# Patient Record
Sex: FEMALE | Race: WHITE | NOT HISPANIC OR LATINO | Employment: OTHER | URBAN - METROPOLITAN AREA
[De-identification: names, ages, dates, MRNs, and addresses within clinical notes are randomized per-mention and may not be internally consistent; named-entity substitution may affect disease eponyms.]

---

## 2017-01-23 ENCOUNTER — OFFICE VISIT (OUTPATIENT)
Dept: OPHTHALMOLOGY | Facility: CLINIC | Age: 67
End: 2017-01-23
Payer: COMMERCIAL

## 2017-01-23 DIAGNOSIS — Z79.899 HIGH RISK MEDICATION USE: Primary | ICD-10-CM

## 2017-01-23 DIAGNOSIS — M06.9 RHEUMATOID ARTHRITIS, INVOLVING UNSPECIFIED SITE, UNSPECIFIED RHEUMATOID FACTOR PRESENCE: ICD-10-CM

## 2017-01-23 PROCEDURE — 92083 EXTENDED VISUAL FIELD XM: CPT | Performed by: OPHTHALMOLOGY

## 2017-01-23 PROCEDURE — 92134 CPTRZ OPH DX IMG PST SGM RTA: CPT | Performed by: OPHTHALMOLOGY

## 2017-01-23 PROCEDURE — 92012 INTRM OPH EXAM EST PATIENT: CPT | Performed by: OPHTHALMOLOGY

## 2017-01-23 ASSESSMENT — VISUAL ACUITY
CORRECTION_TYPE: GLASSES
OD_CC: 20/20
METHOD: SNELLEN - LINEAR
OS_CC: 20/20

## 2017-01-23 ASSESSMENT — EXTERNAL EXAM - RIGHT EYE: OD_EXAM: 1+ BROW PTOSIS

## 2017-01-23 NOTE — MR AVS SNAPSHOT
"              After Visit Summary   1/23/2017    Sofia Shay    MRN: [id number]           Patient Information     Date Of Birth          1950        Visit Information        Provider Department      1/23/2017 1:45 PM Demetri Joshi MD [facility]        Today's Diagnoses     Rheumatoid arthritis, involving unspecified site, unspecified rheumatoid factor presence (H)    -  1       Care Instructions    Use artificial tears up to 4 times daily (Refresh Tears or Systane Ultra/Balance)   Return visit 2 years for repeat Plaquenil visual field and retinal OCT   Continue yearly exams with Dr. Jones  Return visit next available for a lid evaluation    Demetri Joshi M.D.          Follow-ups after your visit        Who to contact     If you have questions or need follow up information about today's clinic visit or your schedule please contact [facility] directly at [phone].  Normal or non-critical lab and imaging results will be communicated to you by MyChart, letter or phone within 4 business days after the clinic has received the results. If you do not hear from us within 7 days, please contact the clinic through [employer] or phone. If you have a critical or abnormal lab result, we will notify you by phone as soon as possible.  Submit refill requests through [employer] or call your pharmacy and they will forward the refill request to us. Please allow 3 business days for your refill to be completed.          Additional Information About Your Visit        MyChart Information     [employer] lets you send messages to your doctor, view your test results, renew your prescriptions, schedule appointments and more. To sign up, go to [email or web address] . Click on \"Log in\" on the left side of the screen, which will take you to the Welcome page. Then click on \"Sign up Now\" on the right side of the page.     You will be asked to enter the access code listed below, as well as some personal " information. Please follow the directions to create your username and password.     Your access code is: [email or web address]  Expires: 2017 11:08 AM     Your access code will  in 90 days. If you need help or a new code, please call your [facility] clinic or [phone].        Care EveryWhere ID     This is your Care EveryWhere ID. This could be used by other organizations to access your [facility] medical records  [id number]         Blood Pressure from Last 3 Encounters:   16 90/55   16 103/47   16 95/51    Weight from Last 3 Encounters:   16 70.943 kg (156 lb 6.4 oz)   16 69.4 kg (153 lb)   16 69.854 kg (154 lb)              We Performed the Following     HC COMPUTERIZED OPHTHALMIC IMAGING RETINA     VISUAL FIELDS EXAM (EXTENDED)        Primary Care Provider Office Phone #    [facility] [phone]       No address on file        Thank you!     Thank you for choosing [facility]  for your care. Our goal is always to provide you with excellent care. Hearing back from our patients is one way we can continue to improve our services. Please take a few minutes to complete the written survey that you may receive in the mail after your visit with us. Thank you!             Your Updated Medication List - Protect others around you: Learn how to safely use, store and throw away your medicines at www.disposemymeds.org.          This list is accurate as of: 17  3:34 PM.  Always use your most recent med list.                   Brand Name Dispense Instructions for use    albuterol 108 (90 BASE) MCG/ACT Inhaler    PROAIR HFA/PROVENTIL HFA/VENTOLIN HFA    1 Inhaler    Inhale 2 puffs into the lungs every 4 hours as needed for shortness of breath / dyspnea or wheezing       calcium + D 600-200 MG-UNIT Tabs   Generic drug:  calcium carbonate-vitamin D      Take 1 tablet by mouth daily.       clonazePAM 0.5 MG tablet    klonoPIN    30 tablet    Take 1 tablet (0.5  mg) by mouth 2 times daily as needed for anxiety       hydroxychloroquine 200 MG tablet    PLAQUENIL     Take 300 mg by mouth daily Takes 1.5 of 200 mg tablet a day       leucovorin 5 MG tablet    WELLCOVORIN     Take 5 mg by mouth once a week Once per week, after methotrexate       methotrexate 2.5 MG tablet CHEMO      8 tablets weekly       OVER-THE-COUNTER     1 Bottle    Place 1 drop into both eyes 2 times daily. Soothe XP artificial tears       pantoprazole 40 MG EC tablet    PROTONIX     Take 40 mg by mouth daily       sertraline 100 MG tablet    ZOLOFT    135 tablet    Take 1.5 tabs daily (150 mg)       simvastatin 20 MG tablet    ZOCOR    90 tablet    Take 1 tablet (20 mg) by mouth At Bedtime       tocilizumab 162 MG/0.9ML subcutaneous injection    ACTEMRA     Inject 285 mg Subcutaneous every 28 days       zoledronic Acid 5 MG/100ML Soln infusion    RECLAST     Inject 5 mg into the vein once yearly

## 2017-01-23 NOTE — Clinical Note
Demetri Joshi M.D.  [facility]  [address] [place] [address]        January 24, 2017  Sofia Shay   MRN: [id number]    Dear Dr. Castillo,          Thank you for referring Sofia A Jaspal, regarding a retinal evaluation for this patient on Plaquenil for her RA..  I had an opportunity to evaluate her on 1/24/2017.  Following  are my findings.     Current Eye Medications:  Plaquenil 300 mg, art. Tears prn both eyes     Subjective:  Here for Mac OCT and HVF today for plaquenil use. Is on 300 mg for about 2 years she thinks.  Used to take HVF at [place], but they no longer have machine.  She last had one in Oct 2015    On Plaquenil, MTX, and Actemera infusions monthly.      Objective:  See Ophthalmology Exam.       Assessment: Normal baseline retinal OCT and central Rosales Visual Field both eyes in patient on Plaquenil.  Probably visually significant dermatochalasis upper lid both eyes.       Plan: Use artificial tears up to 4 times daily (Refresh Tears or Systane Ultra/Balance)   Return visit 2 years for repeat Plaquenil visual field and retinal OCT   Continue yearly exams with Dr. Jones  Return visit next available for a lid evaluation.  Encouraged stopping smoking.    Demetri Joshi M.D.               Base Eye Exam     Visual Acuity (Snellen - Linear)      Right Left   Dist cc 20/20 20/20       Correction:  Glasses      Neuro/Psych     Oriented x3:  Yes    Mood/Affect:  Normal            Additional Notes     Additional Notes     Baseline retinal OCT and central Rosales Visual Field within normal limits both eyes.  Demetri Joshi M.D.              Slit Lamp and Fundus Exam     External Exam      Right Left    External 1+ brow ptosis       Slit Lamp Exam      Right Left    Lids/Lashes 3+, Upper lid dermatochalasis, Lower lid dermatochalasis 2, Meibomian gland dysfunctionFair to good crease both eyes, min.  adiposeUpper lid margin good position 3+, Upper lid dermatochalasis, Lower lid dermatochalasis 2, Meibomian gland dysfunctionFair to good crease both eyes, min. adiposeUpper lid margin good position    Conjunctiva/Sclera Nasal Pinguecula, Temporal Pinguecula Nasal Pinguecula, Temporal Pinguecula    Cornea Clear Clear    Anterior Chamber Deep and quiet Deep and quiet    Iris Round and reactive Round and reactive    Lens Pseudoexfoliation, Trace Nuclear sclerosis cataract, 1 PH UD Pseudoexfoliation, Trace Nuclear sclerosis cataract, 1 PH UD                Thank you again for allowing us to participate in the care of your patient.  If you have any further questions or concerns please feel free to contact me at [phone].      Sincerely,      Demetri Joshi M.D.  [facility]  [address] [place] [address]  [phone]

## 2017-01-23 NOTE — PATIENT INSTRUCTIONS
Use artificial tears up to 4 times daily (Refresh Tears or Systane Ultra/Balance)   Return visit 2 years for repeat Plaquenil visual field and retinal OCT   Continue yearly exams with Dr. Jones  Return visit next available for a lid evaluation    Demetri Joshi M.D.

## 2017-01-23 NOTE — PROGRESS NOTES
Current Eye Medications:  Plaquenil 300 mg, art. Tears prn both eyes     Subjective:  Here for Mac OCT and HVF today for plaquenil use. Is on 300 mg for about 2 years she thinks.  Used to take HVF at [place], but they no longer have machine.  She last had one in Oct 2015    On Plaquenil, MTX, and Actemera infusions monthly.      Objective:  See Ophthalmology Exam.       Assessment: Normal baseline retinal OCT and central Rosales Visual Field both eyes in patient on Plaquenil.  Probably visually significant dermatochalasis upper lid both eyes.       Plan: Use artificial tears up to 4 times daily (Refresh Tears or Systane Ultra/Balance)   Return visit 2 years for repeat Plaquenil visual field and retinal OCT   Continue yearly exams with Dr. Jones  Return visit next available for a lid evaluation.  Encouraged stopping smoking.    Demetri Joshi M.D.

## 2017-04-20 ENCOUNTER — OFFICE VISIT (OUTPATIENT)
Dept: FAMILY MEDICINE | Facility: CLINIC | Age: 67
End: 2017-04-20
Payer: COMMERCIAL

## 2017-04-20 VITALS
DIASTOLIC BLOOD PRESSURE: 60 MMHG | HEART RATE: 81 BPM | SYSTOLIC BLOOD PRESSURE: 108 MMHG | OXYGEN SATURATION: 95 % | WEIGHT: 156.4 LBS | HEIGHT: 60 IN | TEMPERATURE: 97.6 F | BODY MASS INDEX: 30.7 KG/M2

## 2017-04-20 DIAGNOSIS — J44.9 CHRONIC OBSTRUCTIVE PULMONARY DISEASE, UNSPECIFIED COPD TYPE (H): ICD-10-CM

## 2017-04-20 DIAGNOSIS — J01.00 ACUTE NON-RECURRENT MAXILLARY SINUSITIS: Primary | ICD-10-CM

## 2017-04-20 PROCEDURE — 99214 OFFICE O/P EST MOD 30 MIN: CPT | Performed by: NURSE PRACTITIONER

## 2017-04-20 RX ORDER — DOXYCYCLINE 100 MG/1
100 CAPSULE ORAL 2 TIMES DAILY
Qty: 20 CAPSULE | Refills: 0 | Status: SHIPPED | OUTPATIENT
Start: 2017-04-20 | End: 2017-06-26

## 2017-04-20 RX ORDER — TIOTROPIUM BROMIDE 18 UG/1
CAPSULE ORAL; RESPIRATORY (INHALATION)
Qty: 90 CAPSULE | Refills: 3 | Status: SHIPPED | OUTPATIENT
Start: 2017-04-20 | End: 2017-06-26

## 2017-04-20 RX ORDER — PREDNISONE 20 MG/1
40 TABLET ORAL DAILY
Qty: 10 TABLET | Refills: 0 | Status: SHIPPED | OUTPATIENT
Start: 2017-04-20 | End: 2017-04-25

## 2017-04-20 NOTE — MR AVS SNAPSHOT
"              After Visit Summary   2017    Sofia Shay    MRN: [id number]           Patient Information     Date Of Birth          1950        Visit Information        Provider Department      2017 11:00 AM Zulma Harris APRN CNP [facility]        Today's Diagnoses     Acute non-recurrent maxillary sinusitis    -  1    Chronic obstructive pulmonary disease, unspecified COPD type (H)           Follow-ups after your visit        Who to contact     If you have questions or need follow up information about today's clinic visit or your schedule please contact [facility] directly at [phone].  Normal or non-critical lab and imaging results will be communicated to you by MyChart, letter or phone within 4 business days after the clinic has received the results. If you do not hear from us within 7 days, please contact the clinic through MyChart or phone. If you have a critical or abnormal lab result, we will notify you by phone as soon as possible.  Submit refill requests through [employer] or call your pharmacy and they will forward the refill request to us. Please allow 3 business days for your refill to be completed.          Additional Information About Your Visit        MyChart Information     [employer] lets you send messages to your doctor, view your test results, renew your prescriptions, schedule appointments and more. To sign up, go to [email or web address] . Click on \"Log in\" on the left side of the screen, which will take you to the Welcome page. Then click on \"Sign up Now\" on the right side of the page.     You will be asked to enter the access code listed below, as well as some personal information. Please follow the directions to create your username and password.     Your access code is: [email or web address]  Expires: 2017 11:57 AM     Your access code will  in 90 days. If you need help or a new code, please call your [facility] or " [phone].        Care EveryWhere ID     This is your Care EveryWhere ID. This could be used by other organizations to access your [facility] medical records  [id number]        Your Vitals Were     Pulse Temperature Height Pulse Oximetry BMI (Body Mass Index)       81 97.6  F (36.4  C) (Tympanic) 5' (1.524 m) 95% 30.54 kg/m2        Blood Pressure from Last 3 Encounters:   04/20/17 108/60   12/20/16 90/55   12/12/16 103/47    Weight from Last 3 Encounters:   04/20/17 156 lb 6.4 oz (70.9 kg)   12/20/16 156 lb 6.4 oz (70.9 kg)   12/12/16 153 lb (69.4 kg)              Today, you had the following     No orders found for display         Today's Medication Changes          These changes are accurate as of: 4/20/17 11:57 AM.  If you have any questions, ask your nurse or doctor.               Start taking these medicines.        Dose/Directions    doxycycline 100 MG capsule   Commonly known as:  VIBRAMYCIN   Used for:  Acute non-recurrent maxillary sinusitis   Started by:  Zulma Harris APRN CNP        Dose:  100 mg   Take 1 capsule (100 mg) by mouth 2 times daily   Quantity:  20 capsule   Refills:  0       predniSONE 20 MG tablet   Commonly known as:  DELTASONE   Used for:  Chronic obstructive pulmonary disease, unspecified COPD type (H), Acute non-recurrent maxillary sinusitis   Started by:  Zulma Harris APRN CNP        Dose:  40 mg   Take 2 tablets (40 mg) by mouth daily for 5 days   Quantity:  10 tablet   Refills:  0       tiotropium 18 MCG capsule   Commonly known as:  SPIRIVA HANDIHALER   Used for:  Chronic obstructive pulmonary disease, unspecified COPD type (H)   Started by:  Zulma Harris APRN CNP        Inhale contents of one capsule daily.   Quantity:  90 capsule   Refills:  3            Where to get your medicines      These medications were sent to [facility]  [place] - [address]  [address] [address]      Phone:  [phone]     doxycycline 100 MG capsule    predniSONE 20 MG tablet    tiotropium 18 MCG capsule                Primary Care Provider Office Phone #    Rupesh Parks [facility] [phone]       No address on file        Thank you!     Thank you for choosing [facility]  for your care. Our goal is always to provide you with excellent care. Hearing back from our patients is one way we can continue to improve our services. Please take a few minutes to complete the written survey that you may receive in the mail after your visit with us. Thank you!             Your Updated Medication List - Protect others around you: Learn how to safely use, store and throw away your medicines at www.disposemymeds.org.          This list is accurate as of: 4/20/17 11:57 AM.  Always use your most recent med list.                   Brand Name Dispense Instructions for use    albuterol 108 (90 BASE) MCG/ACT Inhaler    PROAIR HFA/PROVENTIL HFA/VENTOLIN HFA    1 Inhaler    Inhale 2 puffs into the lungs every 4 hours as needed for shortness of breath / dyspnea or wheezing       calcium + D 600-200 MG-UNIT Tabs   Generic drug:  calcium carbonate-vitamin D      Take 1 tablet by mouth daily.       clonazePAM 0.5 MG tablet    klonoPIN    30 tablet    Take 1 tablet (0.5 mg) by mouth 2 times daily as needed for anxiety       doxycycline 100 MG capsule    VIBRAMYCIN    20 capsule    Take 1 capsule (100 mg) by mouth 2 times daily       hydroxychloroquine 200 MG tablet    PLAQUENIL     Take 300 mg by mouth daily Takes 1.5 of 200 mg tablet a day       leucovorin 5 MG tablet    WELLCOVORIN     Take 5 mg by mouth once a week Once per week, after methotrexate       methotrexate 2.5 MG tablet CHEMO      8 tablets weekly       OVER-THE-COUNTER     1 Bottle    Place 1 drop into both eyes 2 times daily. Soothe XP artificial tears       pantoprazole 40 MG EC tablet    PROTONIX     Take 40 mg by mouth daily       predniSONE 20 MG tablet     DELTASONE    10 tablet    Take 2 tablets (40 mg) by mouth daily for 5 days       sertraline 100 MG tablet    ZOLOFT    135 tablet    Take 1.5 tabs daily (150 mg)       simvastatin 20 MG tablet    ZOCOR    90 tablet    Take 1 tablet (20 mg) by mouth At Bedtime       tiotropium 18 MCG capsule    SPIRIVA HANDIHALER    90 capsule    Inhale contents of one capsule daily.       tocilizumab 162 MG/0.9ML subcutaneous injection    ACTEMRA     Inject 285 mg Subcutaneous every 28 days       zoledronic Acid 5 MG/100ML Soln infusion    RECLAST     Inject 5 mg into the vein once yearly

## 2017-04-20 NOTE — NURSING NOTE
Chief Complaint   Patient presents with     Sinus Problem     x 5 days       Initial /60  Pulse 81  Temp 97.6  F (36.4  C) (Tympanic)  Ht 5' (1.524 m)  Wt 156 lb 6.4 oz (70.9 kg)  SpO2 95%  BMI 30.54 kg/m2 Estimated body mass index is 30.54 kg/(m^2) as calculated from the following:    Height as of this encounter: 5' (1.524 m).    Weight as of this encounter: 156 lb 6.4 oz (70.9 kg).  Medication Reconciliation: complete

## 2017-04-20 NOTE — PROGRESS NOTES
SUBJECTIVE:                                                    Sofia Shay is a 66 year old female who presents to clinic today for the following health issues:      ENT Symptoms             Symptoms: cc Present Absent Comment   Fever/Chills  x  chills   Fatigue  x     Muscle Aches   x    Eye Irritation  x     Sneezing  x     Nasal Femi/Drg  x     Sinus Pressure/Pain  x     Loss of smell  x     Dental pain   x    Sore Throat  x     Swollen Glands   x    Ear Pain/Fullness  x     Cough  x     Wheeze   x    Chest Pain   x    Shortness of breath   x    Rash   x    Other  x  headache     Symptom duration:  4 days   Symptom severity:  moderate   Treatments tried:  OTC oc seltzer plus   Contacts:  family         Problem list and histories reviewed & adjusted, as indicated.  Additional history: as documented        Reviewed and updated as needed this visit by clinical staff  Tobacco  Allergies  Med Hx  Surg Hx  Fam Hx  Soc Hx      Reviewed and updated as needed this visit by Provider         ROS:  Constitutional, HEENT, cardiovascular, pulmonary, gi and gu systems are negative, except as otherwise noted.    OBJECTIVE:                                                    /60  Pulse 81  Temp 97.6  F (36.4  C) (Tympanic)  Ht 5' (1.524 m)  Wt 156 lb 6.4 oz (70.9 kg)  SpO2 95%  BMI 30.54 kg/m2  Body mass index is 30.54 kg/(m^2).  GENERAL: healthy, alert and no distress  HENT: ear canals and TM's normal, nose and mouth without ulcers or lesions  NECK: no adenopathy, no asymmetry, masses, or scars and thyroid normal to palpation  RESP: lungs clear to auscultation - no rales, rhonchi or wheezes  CV: regular rate and rhythm, normal S1 S2, no S3 or S4, no murmur, click or rub, no peripheral edema and peripheral pulses strong         ASSESSMENT/PLAN:                                                          ICD-10-CM    1. Acute non-recurrent maxillary sinusitis J01.00 doxycycline (VIBRAMYCIN) 100 MG capsule      predniSONE (DELTASONE) 20 MG tablet   2. Chronic obstructive pulmonary disease, unspecified COPD type (H) J44.9 tiotropium (SPIRIVA HANDIHALER) 18 MCG capsule     predniSONE (DELTASONE) 20 MG tablet         The risks, benefits and treatment options of prescribed medications or other treatments have been discussed with the patient. The patient verbalized their understanding and should call or follow up if no improvement or if they develop further problems.    ANN MARIE Segura [facility]

## 2017-04-24 ENCOUNTER — TELEPHONE (OUTPATIENT)
Dept: FAMILY MEDICINE | Facility: CLINIC | Age: 67
End: 2017-04-24

## 2017-06-26 ENCOUNTER — OFFICE VISIT (OUTPATIENT)
Dept: FAMILY MEDICINE | Facility: CLINIC | Age: 67
End: 2017-06-26
Payer: COMMERCIAL

## 2017-06-26 VITALS
BODY MASS INDEX: 30.31 KG/M2 | WEIGHT: 155.2 LBS | TEMPERATURE: 98 F | OXYGEN SATURATION: 95 % | HEART RATE: 91 BPM | DIASTOLIC BLOOD PRESSURE: 64 MMHG | SYSTOLIC BLOOD PRESSURE: 96 MMHG

## 2017-06-26 DIAGNOSIS — H65.03 BILATERAL ACUTE SEROUS OTITIS MEDIA, RECURRENCE NOT SPECIFIED: Primary | ICD-10-CM

## 2017-06-26 DIAGNOSIS — H61.22 IMPACTED CERUMEN OF LEFT EAR: ICD-10-CM

## 2017-06-26 PROCEDURE — 99213 OFFICE O/P EST LOW 20 MIN: CPT | Performed by: NURSE PRACTITIONER

## 2017-06-26 ASSESSMENT — PAIN SCALES - GENERAL: PAINLEVEL: NO PAIN (0)

## 2017-06-26 NOTE — NURSING NOTE
Chief Complaint   Patient presents with     Ear Problem     Plugged ears x10 days.       Initial BP 96/64 (BP Location: Right arm, Cuff Size: Adult Regular)  Pulse 91  Temp 98  F (36.7  C) (Oral)  Wt 155 lb 3.2 oz (70.4 kg)  SpO2 95%  Breastfeeding? No  BMI 30.31 kg/m2 Estimated body mass index is 30.31 kg/(m^2) as calculated from the following:    Height as of 4/20/17: 5' (1.524 m).    Weight as of this encounter: 155 lb 3.2 oz (70.4 kg).  Medication Reconciliation: complete         Juli Ortiz, CMA

## 2017-06-26 NOTE — PATIENT INSTRUCTIONS
Try Mucinex DM  Earache, No Infection (Adult)  Earaches can happen without an infection. This occurs when air and fluid build up behind the eardrum causing a feeling of fullness and discomfort and reduced hearing. This is called otitis media with effusion (OME) or serous otitis media. It means there is fluid in the middle ear. It is not the same as acute otitis media, which is typically from infection.  OME can happen when you have a cold if congestion blocks the passage that drains the middle ear. This passage is called the eustachian tube. OME may also occur with nasal allergies or after a bacterial middle ear infection.    The pain or discomfort may come and go. You may hear clicking or popping sounds when you chew or swallow. You may feel that your balance is off. Or you may hear ringing in the ear.  It often takes from several weeks up to 3 months for the fluid to clear on its own. Oral pain relievers and ear drops help if there is pain. Decongestants and antihistamines sometimes help. Antibiotics don't help since there is no infection. Your doctor may prescribe a nasal spray to help reduce swelling in the nose and eustachian tube. This can allow the ear to drain.  If your OME doesn't improve after 3 months, surgery may be used to drain the fluid and insert a small tube in the eardrum to allow continued drainage.  Because the middle ear fluid can become infected, it is important to watch for signs of an ear infection which may develop later. These signs include increased ear pain, fever, or drainage from the ear.  Home care  The following guidelines will help you care for yourself at home:    You may use over-the-counter medicine as directed to control pain, unless another medicine was prescribed. If you have chronic liver or kidney disease or ever had a stomach ulcer or GI bleeding, talk with your doctor before using these medicines. Aspirin should never be used in anyone under 18 years of age who is ill with  a fever. It may cause severe liver damage.    You may use over-the-counter decongestants such as phenylephrine or pseudoephedrine. But they are not always helpful. Don't use nasal spray decongestants more than 3 days. Longer use can make congestion worse. Prescription nasal sprays from your doctor don't typically have those restrictions.    Antihistamines may help if you are also having allergy symptoms.    You may use medicines such as guaifenesin to thin mucus and promote drainage.  Follow-up care  Follow up with your healthcare provider or as advised if you are not feeling better after 3 days.  When to seek medical advice  Call your healthcare provider right away if any of the following occur:    Your ear pain gets worse or does not start to improve     Fever of 100.4 F (38 C) or higher, or as directed by your healthcare provider    Fluid or blood draining from the ear    Headache or sinus pain    Stiff neck    Unusual drowsiness or confusion  Date Last Reviewed: 10/1/2016    9800-0381 [employer]. [address] [place] [address]. All rights reserved. This information is not intended as a substitute for professional medical care. Always follow your healthcare professional's instructions.      [facility]    If you have any questions regarding to your visit please contact your care team:     Team Pink:   Clinic Hours Telephone Number   Internal Medicine:  Dr. Edwina Newberry, NP       7am-7pm  Monday - Thursday   7am-5pm  Fridays  [phone]  (Appointment scheduling available 24/7)    Questions about your visit?  Team Line  [phone]   Urgent Care - [place] - 11am-9pm Monday-Friday Saturday-Sunday- 9am-5pm   [place] - 5pm-9pm Monday-Friday Saturday-Sunday- 9am-5pm  [phone] - [employer]  [phone] - [place]       What options do I have for visits at the clinic other than the traditional office  visit?  To expand how we care for you, many of our providers are utilizing electronic visits (e-visits) and telephone visits, when medically appropriate, for interactions with their patients rather than a visit in the clinic.   We also offer nurse visits for many medical concerns. Just like any other service, we will bill your insurance company for this type of visit based on time spent on the phone with your provider. Not all insurance companies cover these visits. Please check with your medical insurance if this type of visit is covered. You will be responsible for any charges that are not paid by your insurance.      E-visits via [employer]:  generally incur a $35.00 fee.  Telephone visits:  Time spent on the phone: *charged based on time that is spent on the phone in increments of 10 minutes. Estimated cost:   5-10 mins $30.00   11-20 mins. $59.00   21-30 mins. $85.00   Use [employer] (secure email communication and access to your chart) to send your primary care provider a message or make an appointment. Ask someone on your Team how to sign up for [employer].    For a Price Quote for your services, please call our Consumer Price Line at [phone].    As always, Thank you for trusting us with your health care needs!    Juli Otriz, CMA

## 2017-06-26 NOTE — MR AVS SNAPSHOT
After Visit Summary   6/26/2017    Sofia Shay    MRN: [id number]           Patient Information     Date Of Birth          1950        Visit Information        Provider Department      6/26/2017 2:40 PM Laura Newberry APRN [facility]        Today's Diagnoses     Bilateral acute serous otitis media, recurrence not specified    -  1      Care Instructions    Try Mucinex DM  Earache, No Infection (Adult)  Earaches can happen without an infection. This occurs when air and fluid build up behind the eardrum causing a feeling of fullness and discomfort and reduced hearing. This is called otitis media with effusion (OME) or serous otitis media. It means there is fluid in the middle ear. It is not the same as acute otitis media, which is typically from infection.  OME can happen when you have a cold if congestion blocks the passage that drains the middle ear. This passage is called the eustachian tube. OME may also occur with nasal allergies or after a bacterial middle ear infection.    The pain or discomfort may come and go. You may hear clicking or popping sounds when you chew or swallow. You may feel that your balance is off. Or you may hear ringing in the ear.  It often takes from several weeks up to 3 months for the fluid to clear on its own. Oral pain relievers and ear drops help if there is pain. Decongestants and antihistamines sometimes help. Antibiotics don't help since there is no infection. Your doctor may prescribe a nasal spray to help reduce swelling in the nose and eustachian tube. This can allow the ear to drain.  If your OME doesn't improve after 3 months, surgery may be used to drain the fluid and insert a small tube in the eardrum to allow continued drainage.  Because the middle ear fluid can become infected, it is important to watch for signs of an ear infection which may develop later. These signs include increased ear pain, fever, or drainage from  the ear.  Home care  The following guidelines will help you care for yourself at home:    You may use over-the-counter medicine as directed to control pain, unless another medicine was prescribed. If you have chronic liver or kidney disease or ever had a stomach ulcer or GI bleeding, talk with your doctor before using these medicines. Aspirin should never be used in anyone under 18 years of age who is ill with a fever. It may cause severe liver damage.    You may use over-the-counter decongestants such as phenylephrine or pseudoephedrine. But they are not always helpful. Don't use nasal spray decongestants more than 3 days. Longer use can make congestion worse. Prescription nasal sprays from your doctor don't typically have those restrictions.    Antihistamines may help if you are also having allergy symptoms.    You may use medicines such as guaifenesin to thin mucus and promote drainage.  Follow-up care  Follow up with your healthcare provider or as advised if you are not feeling better after 3 days.  When to seek medical advice  Call your healthcare provider right away if any of the following occur:    Your ear pain gets worse or does not start to improve     Fever of 100.4 F (38 C) or higher, or as directed by your healthcare provider    Fluid or blood draining from the ear    Headache or sinus pain    Stiff neck    Unusual drowsiness or confusion  Date Last Reviewed: 10/1/2016    2252-7853 [employer]. [address] [place] [address]. All rights reserved. This information is not intended as a substitute for professional medical care. Always follow your healthcare professional's instructions.      [facility]    If you have any questions regarding to your visit please contact your care team:     Team Pink:   Clinic Hours Telephone Number   Internal Medicine:  Dr. Edwina Newberry NP       7am-7pm  Monday - Thursday   7am-5pm  Fridays  [phone]  [phone]  (Appointment scheduling available 24/7)    Questions about your visit?  Team Line  [phone]   Urgent Care - [place] - 11am-9pm Monday-Friday Saturday-Sunday- 9am-5pm   [place] - 5pm-9pm Monday-Friday Saturday-Sunday- 9am-5pm  [phone] - [place]   [phone] - [place]       What options do I have for visits at the clinic other than the traditional office visit?  To expand how we care for you, many of our providers are utilizing electronic visits (e-visits) and telephone visits, when medically appropriate, for interactions with their patients rather than a visit in the clinic.   We also offer nurse visits for many medical concerns. Just like any other service, we will bill your insurance company for this type of visit based on time spent on the phone with your provider. Not all insurance companies cover these visits. Please check with your medical insurance if this type of visit is covered. You will be responsible for any charges that are not paid by your insurance.      E-visits via Sutus:  generally incur a $35.00 fee.  Telephone visits:  Time spent on the phone: *charged based on time that is spent on the phone in increments of 10 minutes. Estimated cost:   5-10 mins $30.00   11-20 mins. $59.00   21-30 mins. $85.00   Use Risk I/Ot (secure email communication and access to your chart) to send your primary care provider a message or make an appointment. Ask someone on your Team how to sign up for Sutus.    For a Price Quote for your services, please call our Consumer Price Line at [phone].    As always, Thank you for trusting us with your health care needs!    Juli Ortiz, CASTILLO              Follow-ups after your visit        Who to contact     If you have questions or need follow up information about today's clinic visit or your schedule please contact [facility] directly at [phone].  Normal or non-critical lab and imaging results  "will be communicated to you by MyChart, letter or phone within 4 business days after the clinic has received the results. If you do not hear from us within 7 days, please contact the clinic through BearTail or phone. If you have a critical or abnormal lab result, we will notify you by phone as soon as possible.  Submit refill requests through BearTail or call your pharmacy and they will forward the refill request to us. Please allow 3 business days for your refill to be completed.          Additional Information About Your Visit        BearTail Information     BearTail lets you send messages to your doctor, view your test results, renew your prescriptions, schedule appointments and more. To sign up, go to [email or web address] . Click on \"Log in\" on the left side of the screen, which will take you to the Welcome page. Then click on \"Sign up Now\" on the right side of the page.     You will be asked to enter the access code listed below, as well as some personal information. Please follow the directions to create your username and password.     Your access code is: [email or web address]  Expires: 2017 11:57 AM     Your access code will  in 90 days. If you need help or a new code, please call your [facility] or [phone].        Care EveryWhere ID     This is your Care EveryWhere ID. This could be used by other organizations to access your [facility] medical records  [id number]        Your Vitals Were     Pulse Temperature Pulse Oximetry Breastfeeding? BMI (Body Mass Index)       91 98  F (36.7  C) (Oral) 95% No 30.31 kg/m2        Blood Pressure from Last 3 Encounters:   17 96/64   17 108/60   16 90/55    Weight from Last 3 Encounters:   17 155 lb 3.2 oz (70.4 kg)   17 156 lb 6.4 oz (70.9 kg)   16 156 lb 6.4 oz (70.9 kg)              Today, you had the following     No orders found for display       Primary Care Provider Office Phone #    [facility] [phone] "       No address on file        Equal Access to Services     ANDRZEJ PATENABILA : Hadii tera valencia judd Galindo, wamiladisda luqjaleel, qaybta kaaljorge farhantanaericka, jack idiin haybillyerinn blackwoodguanacogianna bobby. So [facility] [phone].    ATENCIÓN: Si habla español, tiene a arellano disposición servicios gratuitos de asistencia lingüística. Miguelame al [phone].    We comply with applicable federal civil rights laws and [place] laws. We do not discriminate on the basis of race, color, national origin, age, disability sex, sexual orientation or gender identity.            Thank you!     Thank you for choosing [facility]  for your care. Our goal is always to provide you with excellent care. Hearing back from our patients is one way we can continue to improve our services. Please take a few minutes to complete the written survey that you may receive in the mail after your visit with us. Thank you!             Your Updated Medication List - Protect others around you: Learn how to safely use, store and throw away your medicines at www.disposemymeds.org.          This list is accurate as of: 6/26/17  3:23 PM.  Always use your most recent med list.                   Brand Name Dispense Instructions for use Diagnosis    albuterol 108 (90 BASE) MCG/ACT Inhaler    PROAIR HFA/PROVENTIL HFA/VENTOLIN HFA    1 Inhaler    Inhale 2 puffs into the lungs every 4 hours as needed for shortness of breath / dyspnea or wheezing    COPD exacerbation (H)       calcium + D 600-200 MG-UNIT Tabs   Generic drug:  calcium carbonate-vitamin D      Take 1 tablet by mouth daily.        clonazePAM 0.5 MG tablet    klonoPIN    30 tablet    Take 1 tablet (0.5 mg) by mouth 2 times daily as needed for anxiety    Anxiety       hydroxychloroquine 200 MG tablet    PLAQUENIL     Take 300 mg by mouth daily Takes 1.5 of 200 mg tablet a day        leucovorin 5 MG tablet    WELLCOVORIN     Take 5 mg by mouth once a week Once per week, after methotrexate         methotrexate 2.5 MG tablet CHEMO      8 tablets weekly        OVER-THE-COUNTER     1 Bottle    Place 1 drop into both eyes 2 times daily. Soothe XP artificial tears    Dry eye syndrome       pantoprazole 40 MG EC tablet    PROTONIX     Take 40 mg by mouth daily        sertraline 100 MG tablet    ZOLOFT    135 tablet    Take 1.5 tabs daily (150 mg)    Anxiety       simvastatin 20 MG tablet    ZOCOR    90 tablet    Take 1 tablet (20 mg) by mouth At Bedtime    Hyperlipidemia LDL goal <130       tocilizumab 162 MG/0.9ML subcutaneous injection    ACTEMRA     Inject 285 mg Subcutaneous every 28 days        zoledronic Acid 5 MG/100ML Soln infusion    RECLAST     Inject 5 mg into the vein once yearly

## 2017-06-26 NOTE — PROGRESS NOTES
SUBJECTIVE:                                                    Sofia Shay is a 67 year old female who presents to clinic today for the following health issues:      Chief Complaint   Patient presents with     Ear Problem     Plugged ears x10 days.     Patient complains of plugged ear for the past 10 days.  She complains of muffled hearing.  She denies pain, rhinorrhea, sore throat.  She has tried pseudoephedrine, which she felt increased drainage and made her cough.  She also tried an antihistamine, but did not feel it helped.      Problem list and histories reviewed & adjusted, as indicated.  Additional history: as documented    Patient Active Problem List   Diagnosis     Mild major depression (H)     RA (rheumatoid arthritis) (H)     NICOLE (generalized anxiety disorder)     Tobacco abuse     Advanced directives, counseling/discussion     COPD (chronic obstructive pulmonary disease) (H)     Health Care Home     MGUS (monoclonal gammopathy of unknown significance)     PXF (pseudoexfoliation of lens capsule) right     Hyperlipidemia LDL goal <130     Past Surgical History:   Procedure Laterality Date     D & C       HYSTERECTOMY, PAP NO LONGER INDICATED       SALPINGO OOPHORECTOMY,R/L/RUTH         Social History   Substance Use Topics     Smoking status: Current Every Day Smoker     Packs/day: 0.50     Types: Cigarettes     Smokeless tobacco: Never Used      Comment: not ready to quit/declined quit info 12/20/16     Alcohol use No     Family History   Problem Relation Age of Onset     Hypertension Father      Cardiovascular Father      CANCER Father      lung     DIABETES Maternal Grandmother      CANCER Mother      lung     Hypertension Mother      Macular Degeneration Paternal Aunt      CEREBROVASCULAR DISEASE No family hx of      Thyroid Disease No family hx of      Glaucoma No family hx of          Current Outpatient Prescriptions   Medication Sig Dispense Refill     clonazePAM (KLONOPIN) 0.5 MG tablet  Take 1 tablet (0.5 mg) by mouth 2 times daily as needed for anxiety 30 tablet 1     simvastatin (ZOCOR) 20 MG tablet Take 1 tablet (20 mg) by mouth At Bedtime 90 tablet 3     tocilizumab (ACTEMRA) 162 MG/0.9ML subcutaneous injection Inject 285 mg Subcutaneous every 28 days        zoledronic Acid (RECLAST) 5 MG/100ML SOLN Inject 5 mg into the vein once yearly       leucovorin (WELLCOVORIN) 5 MG tablet Take 5 mg by mouth once a week Once per week, after methotrexate       albuterol (PROAIR HFA, PROVENTIL HFA, VENTOLIN HFA) 108 (90 BASE) MCG/ACT inhaler Inhale 2 puffs into the lungs every 4 hours as needed for shortness of breath / dyspnea or wheezing 1 Inhaler 5     pantoprazole (PROTONIX) 40 MG enteric coated tablet Take 40 mg by mouth daily       sertraline (ZOLOFT) 100 MG tablet Take 1.5 tabs daily (150 mg) 135 tablet 3     hydroxychloroquine (PLAQUENIL) 200 MG tablet Take 300 mg by mouth daily Takes 1.5 of 200 mg tablet a day       Calcium Carbonate-Vitamin D (CALCIUM + D) 600-200 MG-UNIT per tablet Take 1 tablet by mouth daily.       OVER-THE-COUNTER Place 1 drop into both eyes 2 times daily. Soothe XP artificial tears 1 Bottle      METHOTREXATE 2.5 MG OR TABS 8 tablets weekly       Allergies   Allergen Reactions     Gabapentin Other (See Comments)     dizzy     Latex Itching     Penicillins Hives     BP Readings from Last 3 Encounters:   06/26/17 96/64   04/20/17 108/60   12/20/16 90/55    Wt Readings from Last 3 Encounters:   06/26/17 155 lb 3.2 oz (70.4 kg)   04/20/17 156 lb 6.4 oz (70.9 kg)   12/20/16 156 lb 6.4 oz (70.9 kg)                  Labs reviewed in EPIC    Reviewed and updated as needed this visit by clinical staff       Reviewed and updated as needed this visit by Provider         ROS:  Constitutional, HEENT, cardiovascular, pulmonary, gi and gu systems are negative, except as otherwise noted.    OBJECTIVE:     BP 96/64 (BP Location: Right arm, Cuff Size: Adult Regular)  Pulse 91  Temp 98  F  (36.7  C) (Oral)  Wt 155 lb 3.2 oz (70.4 kg)  SpO2 95%  Breastfeeding? No  BMI 30.31 kg/m2  Body mass index is 30.31 kg/(m^2).  GENERAL: healthy, alert and no distress  EYES: Eyes grossly normal to inspection, PERRL and conjunctivae and sclerae normal  HENT: normal cephalic/atraumatic, right ear: clear effusion, left ear: occluded with wax and clear effusion following wax removal, nose and mouth without ulcers or lesions, oropharynx clear, oral mucous membranes moist  NECK: no adenopathy, no asymmetry, masses, or scars and thyroid normal to palpation  RESP: lungs clear to auscultation - no rales, rhonchi or wheezes  CV: regular rate and rhythm, normal S1 S2, no S3 or S4, no murmur, click or rub, no peripheral edema and peripheral pulses strong  MS: no gross musculoskeletal defects noted, no edema    Diagnostic Test Results:  none     ASSESSMENT/PLAN:       1. Bilateral acute serous otitis media, recurrence not specified  Patient to continue over the counter decongestants.  If no improvement in 2 weeks, she should follow-up with ENT.    2. Impacted cerumen of left ear  Ear wash performed and wax removed.  - REMOVE IMPACTED CERUMEN    FUTURE APPOINTMENTS:       - Follow-up for annual visit or as needed    ANN MARIE Smith CNP  [facility]

## 2017-07-15 DIAGNOSIS — F41.9 ANXIETY: ICD-10-CM

## 2017-07-17 NOTE — TELEPHONE ENCOUNTER
Sertraline     Last Written Prescription Date: 04/01/2016  Last Fill Quantity: 135, # refills: 3  Last Office Visit with [facility] primary care provider:  04/20/2017        Last PHQ-9 score on record=   PHQ-9 SCORE 12/12/2016   Total Score -   Total Score 6     PHQ-9 SCORE 12/10/2014 4/1/2016 12/12/2016   Total Score 11 - -   Total Score - 7 6     NICOLE-7 SCORE 12/10/2014 4/1/2016 12/12/2016   Total Score 12 - -   Total Score - 9 9       Alan SIDDIQUI (R)

## 2017-07-24 ENCOUNTER — TELEPHONE (OUTPATIENT)
Dept: FAMILY MEDICINE | Facility: CLINIC | Age: 67
End: 2017-07-24

## 2017-07-24 RX ORDER — SERTRALINE HYDROCHLORIDE 100 MG/1
TABLET, FILM COATED ORAL
Qty: 135 TABLET | Refills: 0 | Status: SHIPPED | OUTPATIENT
Start: 2017-07-24 | End: 2017-11-29

## 2017-07-24 NOTE — TELEPHONE ENCOUNTER
Patient returned the call.  Advised her she is due for , she is getting mammograms yearly so she will call to schedule.  She is due to come in for an appt so she will get a FIT test when she comes in for that.  Asked if we could complete the phq9/gad7, she stated it would be the same as last time, so was not done.

## 2017-07-24 NOTE — TELEPHONE ENCOUNTER
Patient is due for colon cancer screening along with other HM items.  FIT test was ordered 4/1/16, not completed.  See HM for other items that are due to ask/remind patient.  PCP is listed as [facility], please verify which clinic patient is going to.    Left message for patient to return call.

## 2017-08-02 DIAGNOSIS — F41.9 ANXIETY: ICD-10-CM

## 2017-08-03 RX ORDER — CLONAZEPAM 0.5 MG/1
TABLET ORAL
Qty: 30 TABLET | Refills: 0 | Status: SHIPPED | OUTPATIENT
Start: 2017-08-03 | End: 2017-11-29

## 2017-08-03 NOTE — TELEPHONE ENCOUNTER
Routing refill request to provider for review/approval because:  Drug not on the [facility] refill protocol     Fina Whyte RN

## 2017-08-03 NOTE — TELEPHONE ENCOUNTER
CLonazepam     Last Written Prescription Date: 12/20/16  Last Fill Quantity: 30,  # refills: 1   Last Office Visit with [facility] or [facility] prescribing provider: 06/26/17

## 2017-09-18 ENCOUNTER — TELEPHONE (OUTPATIENT)
Dept: FAMILY MEDICINE | Facility: CLINIC | Age: 67
End: 2017-09-18

## 2017-09-18 NOTE — TELEPHONE ENCOUNTER
Panel Management Review            Composite cancer screening  Chart review shows that this patient is due/due soon for the following Colonoscopy  Summary:    Patient is due/failing the following:   COLONOSCOPY    Action needed:   Patient needs colon cancer screening- has greed to FIT kit in the past  Will mail one to her    Type of outreach:    Sent letter.    Questions for provider review:    None                                                                                                                                    CASTILLO TUCKER      .

## 2017-09-18 NOTE — LETTER
[facility]  [address] [address]  [phone]    September 18, 2017    Sofia Shay  [address] [address]          Dear Sofia,    Zulma Harris NP has been reviewing your chart.  It appears that there are aspects of your care that could be improved.   At this time you are due for a colonoscopy.    Colon Cancer Screening- Recommended every 5-10 years, depending on your history, in order to prevent and detect colon cancer at its earliest stages.  Colon cancer is now the second leading cause of death in the [place] for both men and women and there are over 130,000 new cases and 50,000 deaths per year from colon cancer.  Colonoscopies can prevent 90-95% of these deaths.  Problem lesions can be removed before they ever become cancer.  This test is not only looking for cancer, but also getting rid of precancerous lesions.  You are usually given some sedation which makes the test very comfortable for most people.     If you have not had a colonoscopy, we encourage you to schedule by contacting Central Scheduling at [phone] Monday -Friday 9am-8pm, or Saturday 9am-6pm.  They will assist you with finding the most convenient time and location.    If you do not wish to do a colonoscopy or cannot afford to do one, at this time, there is another option.  It is called a Fit test or Fecal Immunochemical Occult Blood Test (take home stool sample kit).  It does not replace the colonoscopy for colorectal cancer screening, but it can detect hidden bleeding in the lower colon.  It does need to be repeated every year and if a positive result is obtained, you would be referred for a colonoscopy.  The FIT test is really easy to do and does not require any diet or medication restrictions and involves only one collection sample.   I have enclosed a FIT kit for you if you prefer to do this.    If you have any questions, please call us at  [phone].      Thank you,    Zulma Harris NP/ marciano  [facility] - [phone]  [email or web address]

## 2017-11-28 ENCOUNTER — OFFICE VISIT (OUTPATIENT)
Dept: OPTOMETRY | Facility: CLINIC | Age: 67
End: 2017-11-28
Payer: COMMERCIAL

## 2017-11-28 DIAGNOSIS — H26.8 PXF (PSEUDOEXFOLIATION OF LENS CAPSULE): ICD-10-CM

## 2017-11-28 DIAGNOSIS — H25.13 NUCLEAR SCLEROTIC CATARACT OF BOTH EYES: ICD-10-CM

## 2017-11-28 DIAGNOSIS — H52.03 HYPEROPIA, BILATERAL: Primary | ICD-10-CM

## 2017-11-28 DIAGNOSIS — H52.223 REGULAR ASTIGMATISM OF BOTH EYES: ICD-10-CM

## 2017-11-28 DIAGNOSIS — H52.4 PRESBYOPIA: ICD-10-CM

## 2017-11-28 PROCEDURE — 92015 DETERMINE REFRACTIVE STATE: CPT | Performed by: OPTOMETRIST

## 2017-11-28 PROCEDURE — 92014 COMPRE OPH EXAM EST PT 1/>: CPT | Performed by: OPTOMETRIST

## 2017-11-28 ASSESSMENT — REFRACTION_MANIFEST
OS_CYLINDER: +0.25
OD_CYLINDER: +0.75
OS_CYLINDER: +0.25
OD_CYLINDER: +0.75
OS_ADD: +2.25
OS_AXIS: 170
OD_SPHERE: +1.75
OS_SPHERE: +2.25
OD_AXIS: 006
METHOD_AUTOREFRACTION: 1
OD_SPHERE: +1.75
OS_AXIS: 156
OS_SPHERE: +1.75
OD_ADD: +2.25
OD_AXIS: 005

## 2017-11-28 ASSESSMENT — VISUAL ACUITY
OS_CC: 20/20
OD_CC: 20/25
OD_CC+: -1
CORRECTION_TYPE: GLASSES
OD_CC: 20/30
OS_CC: 20/30-1
METHOD: SNELLEN - LINEAR

## 2017-11-28 ASSESSMENT — SLIT LAMP EXAM - LIDS
COMMENTS: 2+ DERMATOCHALASIS: UPPER LID
COMMENTS: 2+ DERMATOCHALASIS: UPPER LID

## 2017-11-28 ASSESSMENT — REFRACTION_WEARINGRX
OD_AXIS: 180
OD_SPHERE: +1.50
OS_SPHERE: +1.75
SPECS_TYPE: BIFOCAL
OD_ADD: +2.00
OS_AXIS: 170
OD_CYLINDER: +1.00
OS_CYLINDER: +0.50
OS_ADD: +2.00

## 2017-11-28 ASSESSMENT — KERATOMETRY
OS_AXISANGLE2_DEGREES: 42
OD_K1POWER_DIOPTERS: 43.00
OD_AXISANGLE2_DEGREES: 153
OS_K2POWER_DIOPTERS: 42.75
OD_K2POWER_DIOPTERS: 42.75
OS_K1POWER_DIOPTERS: 43.00

## 2017-11-28 ASSESSMENT — TONOMETRY
OS_IOP_MMHG: 18
IOP_METHOD: APPLANATION
OD_IOP_MMHG: 18

## 2017-11-28 ASSESSMENT — EXTERNAL EXAM - LEFT EYE: OS_EXAM: 1+ BROW PTOSIS

## 2017-11-28 ASSESSMENT — EXTERNAL EXAM - RIGHT EYE: OD_EXAM: 1+ BROW PTOSIS

## 2017-11-28 ASSESSMENT — CUP TO DISC RATIO
OD_RATIO: 0.2
OS_RATIO: 0.2

## 2017-11-28 ASSESSMENT — CONF VISUAL FIELD
OD_NORMAL: 1
METHOD: COUNTING FINGERS
OS_NORMAL: 1

## 2017-11-28 NOTE — PROGRESS NOTES
Chief Complaint   Patient presents with     COMPREHENSIVE EYE EXAM     yearly      Accompanied by daughter    Last Eye Exam: 11/16/2016  Dilated Previously: Yes    What are you currently using to see?  Glasses, wears all of the time        Distance Vision Acuity: Noticed gradual change, feels like things are getting worse.     Near Vision Acuity: Not satisfied, thinks that there are changes here, and she notices them more lately     Eye Comfort: dry, air at home is very dry   Do you use eye drops? : Yes: Uses her drops as needed, not daily   Occupation or Hobbies: Retired     [employer]           Medical, surgical and family histories reviewed and updated 11/28/2017.     Discontinued Plaquenil in June 2017    OBJECTIVE: See Ophthalmology exam    ASSESSMENT:    ICD-10-CM    1. Hyperopia, bilateral H52.03 EYE EXAM (SIMPLE-NONBILLABLE)     REFRACTION   2. Regular astigmatism of both eyes H52.223 EYE EXAM (SIMPLE-NONBILLABLE)     REFRACTION   3. Presbyopia H52.4 EYE EXAM (SIMPLE-NONBILLABLE)     REFRACTION   4. PXF (pseudoexfoliation of lens capsule) right H25.89 EYE EXAM (SIMPLE-NONBILLABLE)     REFRACTION   5. Nuclear sclerotic cataract of both eyes H25.13 EYE EXAM (SIMPLE-NONBILLABLE)     REFRACTION      PLAN:   Use artificial tears   Patient Instructions   Patient was advised of today's exam findings.  Optional to fill new glasses prescription, minimal change  Monitor mild cataracts   Return in 1 year for eye exam    Jacquelyn Jones O.D.  [facility]   [address] [place], [place] [id number] [phone]

## 2017-11-28 NOTE — MR AVS SNAPSHOT
"              After Visit Summary   11/28/2017    Sofia Shay    MRN: [id number]           Patient Information     Date Of Birth          1950        Visit Information        Provider Department      11/28/2017 11:00 AM Jacquelyn Jones OD [facility]        Care Instructions    Patient was advised of today's exam findings.  Optional to fill new glasses prescription, minimal change  Monitor mild cataracts   Return in 1 year for eye exam    Jacquelyn Jones O.D.  [facility]   [address] [place] [address]  [phone]            Follow-ups after your visit        Your next 10 appointments already scheduled     Nov 29, 2017 10:20 AM CST   Pre-Op physical with ANN MARIE Ta CNP   [facility]    [address] [address] [phone]              Who to contact     If you have questions or need follow up information about today's clinic visit or your schedule please contact [facility] directly at [phone].  Normal or non-critical lab and imaging results will be communicated to you by [employer], letter or phone within 4 business days after the clinic has received the results. If you do not hear from us within 7 days, please contact the clinic through [employer] or phone. If you have a critical or abnormal lab result, we will notify you by phone as soon as possible.  Submit refill requests through [employer] or call your pharmacy and they will forward the refill request to us. Please allow 3 business days for your refill to be completed.          Additional Information About Your Visit        [employer] Information     [employer] lets you send messages to your doctor, view your test results, renew your prescriptions, schedule appointments and more. To sign up, go to [email or web address] . Click on \"Log in\" on the left side of the screen, which will take you to the Welcome page. Then " "click on \"Sign up Now\" on the right side of the page.     You will be asked to enter the access code listed below, as well as some personal information. Please follow the directions to create your username and password.     Your access code is: [email or web address]  Expires: 2018 12:10 PM     Your access code will  in 90 days. If you need help or a new code, please call your [facility] clinic or [phone].        Care EveryWhere ID     This is your Care EveryWhere ID. This could be used by other organizations to access your [facility] medical records  [id number]         Blood Pressure from Last 3 Encounters:   17 96/64   17 108/60   16 90/55    Weight from Last 3 Encounters:   17 70.4 kg (155 lb 3.2 oz)   17 70.9 kg (156 lb 6.4 oz)   16 70.9 kg (156 lb 6.4 oz)              Today, you had the following     No orders found for display       Primary Care Provider Office Phone # Fax #    [facility] [phone]       [address] [address]        Equal Access to Services     PABLO WILSON : Hadii aad ku hadasho Soomaali, waaxda luqadaha, qaybta kaalmada adeegyada, waxay ashley haybillyn kumar masters . So [facility] [phone].    ATENCIÓN: Si habla español, tiene a arellano disposición servicios gratuitos de asistencia lingüística. Llame al [phone].    We comply with applicable federal civil rights laws and [place] laws. We do not discriminate on the basis of race, color, national origin, age, disability, sex, sexual orientation, or gender identity.            Thank you!     Thank you for choosing [facility]  for your [facility]. Our goal is always to provide you with excellent care. Hearing back from our patients is one way we can continue to improve our services. Please take a few minutes to complete the written survey that you may receive in the mail after your visit with us. Thank you!             Your Updated Medication List " - Protect others around you: Learn how to safely use, store and throw away your medicines at www.disposemymeds.org.          This list is accurate as of: 11/28/17 12:11 PM.  Always use your most recent med list.                   Brand Name Dispense Instructions for use Diagnosis    albuterol 108 (90 BASE) MCG/ACT Inhaler    PROAIR HFA/PROVENTIL HFA/VENTOLIN HFA    1 Inhaler    Inhale 2 puffs into the lungs every 4 hours as needed for shortness of breath / dyspnea or wheezing    COPD exacerbation (H)       calcium + D 600-200 MG-UNIT Tabs   Generic drug:  calcium carbonate-vitamin D      Take 1 tablet by mouth daily.        clonazePAM 0.5 MG tablet    klonoPIN    30 tablet    TAKE ONE TABLET BY MOUTH TWICE DAILY AS NEEDED FOR ANXIETY    Anxiety       leucovorin 5 MG tablet    WELLCOVORIN     Take 5 mg by mouth once a week Once per week, after methotrexate        methotrexate 2.5 MG tablet CHEMO      8 tablets weekly        OVER-THE-COUNTER     1 Bottle    Place 1 drop into both eyes 2 times daily. Soothe XP artificial tears    Dry eye syndrome       pantoprazole 40 MG EC tablet    PROTONIX     Take 40 mg by mouth daily        sertraline 100 MG tablet    ZOLOFT    135 tablet    TAKE ONE & ONE-HALF TABLETS BY MOUTH ONCE DAILY    Anxiety       simvastatin 20 MG tablet    ZOCOR    90 tablet    Take 1 tablet (20 mg) by mouth At Bedtime    Hyperlipidemia LDL goal <130       tocilizumab 162 MG/0.9ML subcutaneous injection    ACTEMRA     Inject 285 mg Subcutaneous every 28 days        zoledronic Acid 5 MG/100ML Soln infusion    RECLAST     Inject 5 mg into the vein once yearly

## 2017-11-28 NOTE — PATIENT INSTRUCTIONS
Patient was advised of today's exam findings.  Optional to fill new glasses prescription, minimal change  Monitor mild cataracts   Return in 1 year for eye exam    Jacquelyn Jones O.D.  [facility]   [address] [place] [address] [phone]

## 2017-11-29 ENCOUNTER — OFFICE VISIT (OUTPATIENT)
Dept: FAMILY MEDICINE | Facility: CLINIC | Age: 67
End: 2017-11-29
Payer: COMMERCIAL

## 2017-11-29 VITALS
DIASTOLIC BLOOD PRESSURE: 65 MMHG | HEIGHT: 60 IN | TEMPERATURE: 97.7 F | SYSTOLIC BLOOD PRESSURE: 97 MMHG | WEIGHT: 155 LBS | OXYGEN SATURATION: 97 % | HEART RATE: 80 BPM | BODY MASS INDEX: 30.43 KG/M2

## 2017-11-29 DIAGNOSIS — F32.0 MILD MAJOR DEPRESSION (H): ICD-10-CM

## 2017-11-29 DIAGNOSIS — J44.9 CHRONIC OBSTRUCTIVE PULMONARY DISEASE, UNSPECIFIED COPD TYPE (H): ICD-10-CM

## 2017-11-29 DIAGNOSIS — G56.01 CARPAL TUNNEL SYNDROME OF RIGHT WRIST: ICD-10-CM

## 2017-11-29 DIAGNOSIS — Z01.818 PREOP GENERAL PHYSICAL EXAM: Primary | ICD-10-CM

## 2017-11-29 DIAGNOSIS — M06.9 RHEUMATOID ARTHRITIS, INVOLVING UNSPECIFIED SITE, UNSPECIFIED RHEUMATOID FACTOR PRESENCE: ICD-10-CM

## 2017-11-29 DIAGNOSIS — E78.5 HYPERLIPIDEMIA LDL GOAL <130: ICD-10-CM

## 2017-11-29 DIAGNOSIS — Z72.0 TOBACCO ABUSE: ICD-10-CM

## 2017-11-29 DIAGNOSIS — Z23 ENCOUNTER FOR ADMINISTRATION OF VACCINE: ICD-10-CM

## 2017-11-29 DIAGNOSIS — F41.9 ANXIETY: ICD-10-CM

## 2017-11-29 LAB
CHOLEST SERPL-MCNC: 222 MG/DL
HDLC SERPL-MCNC: 85 MG/DL
LDLC SERPL CALC-MCNC: 121 MG/DL
NONHDLC SERPL-MCNC: 137 MG/DL
TRIGL SERPL-MCNC: 80 MG/DL

## 2017-11-29 PROCEDURE — 99215 OFFICE O/P EST HI 40 MIN: CPT | Mod: 25 | Performed by: NURSE PRACTITIONER

## 2017-11-29 PROCEDURE — 90471 IMMUNIZATION ADMIN: CPT | Performed by: NURSE PRACTITIONER

## 2017-11-29 PROCEDURE — 90714 TD VACC NO PRESV 7 YRS+ IM: CPT | Performed by: NURSE PRACTITIONER

## 2017-11-29 PROCEDURE — 36415 COLL VENOUS BLD VENIPUNCTURE: CPT | Performed by: NURSE PRACTITIONER

## 2017-11-29 PROCEDURE — 80061 LIPID PANEL: CPT | Performed by: NURSE PRACTITIONER

## 2017-11-29 RX ORDER — SERTRALINE HYDROCHLORIDE 100 MG/1
TABLET, FILM COATED ORAL
Qty: 135 TABLET | Refills: 3 | Status: SHIPPED | OUTPATIENT
Start: 2017-11-29 | End: 2019-03-08

## 2017-11-29 RX ORDER — ALBUTEROL SULFATE 90 UG/1
2 AEROSOL, METERED RESPIRATORY (INHALATION) EVERY 4 HOURS PRN
Qty: 1 INHALER | Refills: 5 | Status: SHIPPED | OUTPATIENT
Start: 2017-11-29 | End: 2019-09-30

## 2017-11-29 RX ORDER — CLONAZEPAM 0.5 MG/1
TABLET ORAL
Qty: 30 TABLET | Refills: 0 | Status: SHIPPED | OUTPATIENT
Start: 2017-11-29 | End: 2019-09-30

## 2017-11-29 RX ORDER — SIMVASTATIN 20 MG
20 TABLET ORAL AT BEDTIME
Qty: 90 TABLET | Refills: 3 | Status: SHIPPED | OUTPATIENT
Start: 2017-11-29 | End: 2019-09-30

## 2017-11-29 NOTE — PROGRESS NOTES
"  SUBJECTIVE:   Sofia Shay is a 67 year old female who presents to clinic today for the following health issues:  Refill medications-    Hyperlipidemia Follow-Up      Rate your low fat/cholesterol diet?: not monitoring fat    Taking statin?  Yes, no muscle aches from statin    Other lipid medications/supplements?:  none    Anxiety  And depression Follow-Up    Status since last visit: No change    Other associated symptoms:None    Complicating factors:   Significant life event: No   Current substance abuse: None  Depression symptoms: Yes-    NICOLE-7 SCORE 12/10/2014 4/1/2016 12/12/2016   Total Score 12 - -   Total Score - 9 9     PHQ-9 SCORE 12/10/2014 4/1/2016 12/12/2016   Total Score 11 - -   Total Score - 7 6       GAD7          COPD Follow-Up    Symptoms are currently: stable    Current fatigue or dyspnea with ambulation: none    Shortness of breath: stable    Increased or change in Cough/Sputum: No    Fever(s): No    Baseline ambulation without stopping to rest:  1 miles. Able to walk up several  flights of stairs without stopping to rest.- breathing doesn't stop activity- has hip pain that limits walking    Any ER/UC or hospital admissions since your last visit? No     History   Smoking Status     Current Every Day Smoker     Packs/day: 0.50     Types: Cigarettes   Smokeless Tobacco     Never Used     Comment: not ready to quit/declined quit info 12/20/16     Lab Results   Component Value Date    FEV1 93 07/19/2012    HLW6LDO 68@ 05/11/2007               {additional problems for provider to add:974855}    Problem list and histories reviewed & adjusted, as indicated.  Additional history: {NONE - AS DOCUMENTED:439956::\"as documented\"}    {HIST REVIEW/ LINKS 2:300094}    Reviewed and updated as needed this visit by clinical staffTobacco  Allergies  Meds       Reviewed and updated as needed this visit by Provider         {PROVIDER CHARTING PREFERENCE:322267}  "
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
## 2017-11-29 NOTE — MR AVS SNAPSHOT
After Visit Summary   11/29/2017    Sofia Shay    MRN: [id number]           Patient Information     Date Of Birth          1950        Visit Information        Provider Department      11/29/2017 10:20 AM Zulma Harris APRN CNP [facility]        Today's Diagnoses     Preop general physical exam    -  1    Carpal tunnel syndrome of right wrist        Chronic obstructive pulmonary disease, unspecified COPD type (H)        Mild major depression (H)        Rheumatoid arthritis, involving unspecified site, unspecified rheumatoid factor presence (H)        Anxiety        Hyperlipidemia LDL goal <130        Tobacco abuse        Encounter for administration of vaccine          Care Instructions      Before Your Surgery      Call your surgeon if there is any change in your health. This includes signs of a cold or flu (such as a sore throat, runny nose, cough, rash or fever).    Do not smoke, drink alcohol or take over the counter medicine (unless your surgeon or primary care doctor tells you to) for the 24 hours before and after surgery.    If you take prescribed drugs: Follow your doctor s orders about which medicines to take and which to stop until after surgery.    Eating and drinking prior to surgery: follow the instructions from your surgeon    Take a shower or bath the night before surgery. Use the soap your surgeon gave you to gently clean your skin. If you do not have soap from your surgeon, use your regular soap. Do not shave or scrub the surgery site.  Wear clean pajamas and have clean sheets on your bed.           Follow-ups after your visit        Who to contact     If you have questions or need follow up information about today's clinic visit or your schedule please contact [facility] directly at [phone].  Normal or non-critical lab and imaging results will be communicated to you by MyChart, letter or phone within 4 business days after  "the clinic has received the results. If you do not hear from us within 7 days, please contact the clinic through [employer] or phone. If you have a critical or abnormal lab result, we will notify you by phone as soon as possible.  Submit refill requests through [employer] or call your pharmacy and they will forward the refill request to us. Please allow 3 business days for your refill to be completed.          Additional Information About Your Visit        [employer] Information     [employer] lets you send messages to your doctor, view your test results, renew your prescriptions, schedule appointments and more. To sign up, go to [email or web address] . Click on \"Log in\" on the left side of the screen, which will take you to the Welcome page. Then click on \"Sign up Now\" on the right side of the page.     You will be asked to enter the access code listed below, as well as some personal information. Please follow the directions to create your username and password.     Your access code is: [email or web address]  Expires: 2018 12:10 PM     Your access code will  in 90 days. If you need help or a new code, please call your [place] clinic or [phone].        Care EveryWhere ID     This is your Care EveryWhere ID. This could be used by other organizations to access your [place] medical records  [id number]        Your Vitals Were     Pulse Temperature Height Pulse Oximetry BMI (Body Mass Index)       80 97.7  F (36.5  C) (Oral) 5' (1.524 m) 97% 30.27 kg/m2        Blood Pressure from Last 3 Encounters:   17 97/65   17 96/64   17 108/60    Weight from Last 3 Encounters:   17 155 lb (70.3 kg)   17 155 lb 3.2 oz (70.4 kg)   17 156 lb 6.4 oz (70.9 kg)              We Performed the Following     ADMIN 1st VACCINE     Lipid panel reflex to direct LDL Fasting     TD (ADULT, 7+) PRESERVE FREE          Today's Medication Changes          These changes are accurate as of: 17  1:33 PM.  If you " have any questions, ask your nurse or doctor.               These medicines have changed or have updated prescriptions.        Dose/Directions    clonazePAM 0.5 MG tablet   Commonly known as:  klonoPIN   This may have changed:  See the new instructions.   Used for:  Anxiety   Changed by:  Zulma Harris APRN CNP        TAKE ONE TABLET BY MOUTH TWICE DAILY AS NEEDED FOR ANXIETY   Quantity:  30 tablet   Refills:  0       sertraline 100 MG tablet   Commonly known as:  ZOLOFT   This may have changed:  See the new instructions.   Used for:  Anxiety   Changed by:  Zulma Harris APRN CNP        TAKE ONE & ONE-HALF TABLETS BY MOUTH ONCE DAILY   Quantity:  135 tablet   Refills:  3            Where to get your medicines      These medications were sent to [facility] [address]  [address] [address]     Phone:  [phone]     albuterol 108 (90 BASE) MCG/ACT Inhaler    sertraline 100 MG tablet    simvastatin 20 MG tablet         Some of these will need a paper prescription and others can be bought over the counter.  Ask your nurse if you have questions.     Bring a paper prescription for each of these medications     clonazePAM 0.5 MG tablet                Primary Care Provider Office Phone # Fax #    [place] [facility] [phone] [address] [address]        Equal Access to Services     ANDRZEJ WILSON AH: Hadii tera ku hadasho Soomaali, waaxda luqadaha, qaybta kaalmada adeegyada, waxay ashley hayfiorella bobby. So [facility] [phone].    ATENCIÓN: Si habla español, tiene a arellano disposición servicios gratuitos de asistencia lingüística. Llame al [phone].    We comply with applicable federal civil rights laws and [place] laws. We do not discriminate on the basis of race, color, national origin, age, disability, sex, sexual orientation, or gender identity.            Thank you!      Thank you for choosing [facility]  for your care. Our goal is always to provide you with excellent care. Hearing back from our patients is one way we can continue to improve our services. Please take a few minutes to complete the written survey that you may receive in the mail after your visit with us. Thank you!             Your Updated Medication List - Protect others around you: Learn how to safely use, store and throw away your medicines at www.disposemymeds.org.          This list is accurate as of: 11/29/17  1:33 PM.  Always use your most recent med list.                   Brand Name Dispense Instructions for use Diagnosis    albuterol 108 (90 BASE) MCG/ACT Inhaler    PROAIR HFA/PROVENTIL HFA/VENTOLIN HFA    1 Inhaler    Inhale 2 puffs into the lungs every 4 hours as needed for shortness of breath / dyspnea or wheezing    Chronic obstructive pulmonary disease, unspecified COPD type (H)       calcium + D 600-200 MG-UNIT Tabs   Generic drug:  calcium carbonate-vitamin D      Take 1 tablet by mouth daily.        clonazePAM 0.5 MG tablet    klonoPIN    30 tablet    TAKE ONE TABLET BY MOUTH TWICE DAILY AS NEEDED FOR ANXIETY    Anxiety       leucovorin 5 MG tablet    WELLCOVORIN     Take 5 mg by mouth once a week Once per week, after methotrexate        methotrexate 2.5 MG tablet CHEMO      8 tablets weekly        OVER-THE-COUNTER     1 Bottle    Place 1 drop into both eyes 2 times daily. Soothe XP artificial tears    Dry eye syndrome       pantoprazole 40 MG EC tablet    PROTONIX     Take 40 mg by mouth daily        sertraline 100 MG tablet    ZOLOFT    135 tablet    TAKE ONE & ONE-HALF TABLETS BY MOUTH ONCE DAILY    Anxiety       simvastatin 20 MG tablet    ZOCOR    90 tablet    Take 1 tablet (20 mg) by mouth At Bedtime    Hyperlipidemia LDL goal <130       tocilizumab 162 MG/0.9ML subcutaneous injection    ACTEMRA     Inject 285 mg Subcutaneous every 28 days        zoledronic Acid 5 MG/100ML Soln  infusion    RECLAST     Inject 5 mg into the vein once yearly

## 2017-11-29 NOTE — LETTER
My Depression Action Plan  Name: Sofia Shay   Date of Birth 1950  Date: 11/29/2017    My doctor: Rupesh Delcid   My clinic: [facility]  [address] [address]  [phone]          GREEN    ZONE   Good Control    What it looks like:     Things are going generally well. You have normal up s and down s. You may even feel depressed from time to time, but bad moods usually last less than a day.   What you need to do:  1. Continue to care for yourself (see self care plan)  2. Check your depression survival kit and update it as needed  3. Follow your physician s recommendations including any medication.  4. Do not stop taking medication unless you consult with your physician first.           YELLOW         ZONE Getting Worse    What it looks like:     Depression is starting to interfere with your life.     It may be hard to get out of bed; you may be starting to isolate yourself from others.    Symptoms of depression are starting to last most all day and this has happened for several days.     You may have suicidal thoughts but they are not constant.   What you need to do:     1. Call your care team, your response to treatment will improve if you keep your care team informed of your progress. Yellow periods are signs an adjustment may need to be made.     2. Continue your self-care, even if you have to fake it!    3. Talk to someone in your support network    4. Open up your depression survival kit           RED    ZONE Medical Alert - Get Help    What it looks like:     Depression is seriously interfering with your life.     You may experience these or other symptoms: You can t get out of bed most days, can t work or engage in other necessary activities, you have trouble taking care of basic hygiene, or basic responsibilities, thoughts of suicide or death that will not go away, self-injurious behavior.     What you need to do:  1. Call your care  team and request a same-day appointment. If they are not available (weekends or after hours) call your local crisis line, emergency room or 911.      Electronically signed by: Jeannette Stuart, November 29, 2017    Depression Self Care Plan / Survival Kit    Self-Care for Depression  Here s the deal. Your body and mind are really not as separate as most people think.  What you do and think affects how you feel and how you feel influences what you do and think. This means if you do things that people who feel good do, it will help you feel better.  Sometimes this is all it takes.  There is also a place for medication and therapy depending on how severe your depression is, so be sure to consult with your medical provider and/ or Behavioral Health Consultant if your symptoms are worsening or not improving.     In order to better manage my stress, I will:    Exercise  Get some form of exercise, every day. This will help reduce pain and release endorphins, the  feel good  chemicals in your brain. This is almost as good as taking antidepressants!  This is not the same as joining a gym and then never going! (they count on that by the way ) It can be as simple as just going for a walk or doing some gardening, anything that will get you moving.      Hygiene   Maintain good hygiene (Get out of bed in the morning, Make your bed, Brush your teeth, Take a shower, and Get dressed like you were going to work, even if you are unemployed).  If your clothes don't fit try to get ones that do.    Diet  I will strive to eat foods that are good for me, drink plenty of water, and avoid excessive sugar, caffeine, alcohol, and other mood-altering substances.  Some foods that are helpful in depression are: complex carbohydrates, B vitamins, flaxseed, fish or fish oil, fresh fruits and vegetables.    Psychotherapy  I agree to participate in Individual Therapy (if recommended).    Medication  If prescribed medications, I agree to take them.   Missing doses can result in serious side effects.  I understand that drinking alcohol, or other illicit drug use, may cause potential side effects.  I will not stop my medication abruptly without first discussing it with my provider.    Staying Connected With Others  I will stay in touch with my friends, family members, and my primary care provider/team.    Use your imagination  Be creative.  We all have a creative side; it doesn t matter if it s oil painting, sand castles, or mud pies! This will also kick up the endorphins.    Witness Beauty  (AKA stop and smell the roses) Take a look outside, even in mid-winter. Notice colors, textures. Watch the squirrels and birds.     Service to others  Be of service to others.  There is always someone else in need.  By helping others we can  get out of ourselves  and remember the really important things.  This also provides opportunities for practicing all the other parts of the program.    Humor  Laugh and be silly!  Adjust your TV habits for less news and crime-drama and more comedy.    Control your stress  Try breathing deep, massage therapy, biofeedback, and meditation. Find time to relax each day.     My support system    Clinic Contact:  Phone number:    Contact 1:  Phone number:    Contact 2:  Phone number:    Taoist/:  Phone number:    Therapist:  Phone number:    Local crisis center:    Phone number:    Other community support:  Phone number:

## 2017-11-29 NOTE — NURSING NOTE
Chief Complaint   Patient presents with     Pre-Op Exam     Health Maintenance     colon cancer screening due     Refill Request     is fasting for any blood work needed       Initial BP 97/65 (BP Location: Left arm)  Pulse 80  Temp 97.7  F (36.5  C) (Oral)  Ht 5' (1.524 m)  Wt 155 lb (70.3 kg)  SpO2 97%  BMI 30.27 kg/m2 Estimated body mass index is 30.27 kg/(m^2) as calculated from the following:    Height as of this encounter: 5' (1.524 m).    Weight as of this encounter: 155 lb (70.3 kg).  Medication Reconciliation: complete

## 2017-11-29 NOTE — LETTER
November 29, 2017      Sofia Shay  [address] [address]        Dear Ms.Ossineke,    We are writing to inform you of your test results.  Cholesterol is in an acceptable range.Continue medication.  Component      Latest Ref Rng & Units 11/29/2017   Cholesterol      <200 mg/dL 222 (H)   Triglycerides      <150 mg/dL 80   HDL Cholesterol      >49 mg/dL 85   LDL Cholesterol Calculated      <100 mg/dL 121 (H)   Non HDL Cholesterol      <130 mg/dL 137 (H)     If you have any questions or concerns, please call the clinic at the number listed above.       Sincerely,        ANN MARIE Segura CNP

## 2017-12-22 ENCOUNTER — TRANSFERRED RECORDS (OUTPATIENT)
Dept: HEALTH INFORMATION MANAGEMENT | Facility: CLINIC | Age: 67
End: 2017-12-22

## 2017-12-28 ENCOUNTER — TRANSFERRED RECORDS (OUTPATIENT)
Dept: HEALTH INFORMATION MANAGEMENT | Facility: CLINIC | Age: 67
End: 2017-12-28

## 2018-04-11 ENCOUNTER — TELEPHONE (OUTPATIENT)
Dept: FAMILY MEDICINE | Facility: CLINIC | Age: 68
End: 2018-04-11

## 2018-04-11 NOTE — TELEPHONE ENCOUNTER
Panel Management Review          Composite cancer screening  Chart review shows that this patient is due/due soon for the following Mammogram and Colonoscopy  Summary:    Patient is due/failing the following:   COLONOSCOPY and MAMMOGRAM    Action needed:   Patient needs referral/order: colonoscopy vs FIT    Type of outreach:    Sent letter.    Questions for provider review:    None                                                                                                                                    CASTILLO TUCKER

## 2018-04-11 NOTE — LETTER
[facility]  [address] [address]  [phone]    April 11, 2018    Sofia Shay  [address] [address]          Dear Sofia,    --Mammogram screening is generally recommended every year starting at age of 50.  Some women may choose to be screened at an earlier age ( between 40 & 50) depending on risk factors and discussions with her primary provider.   Call [phone] to schedule mammogram at [place]  Or call 1 [phone] for other locations including [place].  --Colon cancer screening is generally recommended in all patients over the age of 50 years of age. This can be with either colonoscopy every 10 years, or testing the stool for blood one time per year (called a FIT test, a simple card you can send back to us in the mail). On review of our electronic medical record it appears you are due for colon cancer screening. Please call the clinic to assist in setting up a colonoscopy or, if you prefer, setting up the test for stool blood(FIT).    If you have had any of these tests at an outside facility, please attempt to get them sent to us so that we can update your record.     Please disregard this notice if you have already scheduled an appointment.     Sincerely,    Zulma GROVER  [facility] - [phone]  [email or web address]

## 2018-08-10 ENCOUNTER — OFFICE VISIT (OUTPATIENT)
Dept: FAMILY MEDICINE | Facility: CLINIC | Age: 68
End: 2018-08-10
Payer: COMMERCIAL

## 2018-08-10 VITALS
SYSTOLIC BLOOD PRESSURE: 101 MMHG | WEIGHT: 156.2 LBS | DIASTOLIC BLOOD PRESSURE: 68 MMHG | BODY MASS INDEX: 30.51 KG/M2 | OXYGEN SATURATION: 95 % | TEMPERATURE: 98 F | HEART RATE: 90 BPM

## 2018-08-10 DIAGNOSIS — Z87.891 PERSONAL HISTORY OF TOBACCO USE, PRESENTING HAZARDS TO HEALTH: ICD-10-CM

## 2018-08-10 DIAGNOSIS — N30.01 ACUTE CYSTITIS WITH HEMATURIA: Primary | ICD-10-CM

## 2018-08-10 DIAGNOSIS — R35.0 URINARY FREQUENCY: ICD-10-CM

## 2018-08-10 LAB

## 2018-08-10 PROCEDURE — 87086 URINE CULTURE/COLONY COUNT: CPT | Performed by: FAMILY MEDICINE

## 2018-08-10 PROCEDURE — 87088 URINE BACTERIA CULTURE: CPT | Performed by: FAMILY MEDICINE

## 2018-08-10 PROCEDURE — 87186 SC STD MICRODIL/AGAR DIL: CPT | Performed by: FAMILY MEDICINE

## 2018-08-10 PROCEDURE — 99213 OFFICE O/P EST LOW 20 MIN: CPT | Performed by: FAMILY MEDICINE

## 2018-08-10 PROCEDURE — 81001 URINALYSIS AUTO W/SCOPE: CPT | Performed by: FAMILY MEDICINE

## 2018-08-10 RX ORDER — NITROFURANTOIN 25; 75 MG/1; MG/1
100 CAPSULE ORAL 2 TIMES DAILY
Qty: 14 CAPSULE | Refills: 0 | Status: SHIPPED | OUTPATIENT
Start: 2018-08-10 | End: 2018-08-13 | Stop reason: ALTCHOICE

## 2018-08-10 NOTE — MR AVS SNAPSHOT
After Visit Summary   8/10/2018    Sofia Shay    MRN: [id number]           Patient Information     Date Of Birth          1950        Visit Information        Provider Department      8/10/2018 3:00 PM Verenice Santacruz MD [facility]        Today's Diagnoses     Acute cystitis with hematuria    -  1    Urinary frequency        Personal history of tobacco use, presenting hazards to health          Care Instructions      Understanding Urinary Tract Infections (UTIs)  Most UTIs are caused by bacteria, although they may also be caused by viruses or fungi. Bacteria from the bowel are the most common source of infection. The infection may start because of any of the following:    Sexual activity. During sex, bacteria can travel from the penis, vagina, or rectum into the urethra.     Bacteria on the skin outside the rectum may travel into the urethra. This is more common in women since the rectum and urethra are closer to each other than in men. Wiping from front to back after using the toilet and keeping the area clean can help prevent germs from getting to the urethra.    Blockage of urine flow through the urinary tract. If urine sits too long, germs may start to grow out of control.      Parts of the urinary tract  The infection can occur in any part of the urinary tract.    The kidneys collect and store urine.    The ureters carry urine from the kidneys to the bladder.    The bladder holds urine until you are ready to let it out.    The urethra carries urine from the bladder out of the body. It is shorter in women, so bacteria can move through it more easily. The urethra is longer in men, so a UTI is less likely to reach the bladder or kidneys in men.  Date Last Reviewed: 1/1/2017 2000-2017 [employer]. [address] [place] [address], [place] [address]. All rights reserved. This information is not intended as a substitute for professional medical care. Always follow  "your healthcare professional's instructions.                Follow-ups after your visit        Follow-up notes from your care team     Return if symptoms worsen or fail to improve.      Who to contact     Normal or non-critical lab and imaging results will be communicated to you by PeptiVirhart, letter or phone within 4 business days after the clinic has received the results. If you do not hear from us within 7 days, please contact the clinic through PeptiVirhart or phone. If you have a critical or abnormal lab result, we will notify you by phone as soon as possible.  Submit refill requests through ChinaPNR or call your pharmacy and they will forward the refill request to us. Please allow 3 business days for your refill to be completed.          If you need to speak with a  for additional information , please call: [phone]             Additional Information About Your Visit        ChinaPNR Information     ChinaPNR lets you send messages to your doctor, view your test results, renew your prescriptions, schedule appointments and more. To sign up, go to [email or web address] . Click on \"Log in\" on the left side of the screen, which will take you to the Welcome page. Then click on \"Sign up Now\" on the right side of the page.     You will be asked to enter the access code listed below, as well as some personal information. Please follow the directions to create your username and password.     Your access code is: [email or web address]  Expires: 2018  3:41 PM     Your access code will  in 90 days. If you need help or a new code, please call your [place] clinic or [phone].        Care EveryWhere ID     This is your Care EveryWhere ID. This could be used by other organizations to access your [place] medical records  [id number]        Your Vitals Were     Pulse Temperature Pulse Oximetry BMI (Body Mass Index)          90 98  F (36.7  C) (Tympanic) 95% 30.51 kg/m2         Blood Pressure from Last 3 " Encounters:   08/10/18 101/68   11/29/17 97/65   06/26/17 96/64    Weight from Last 3 Encounters:   08/10/18 156 lb 3.2 oz (70.9 kg)   11/29/17 155 lb (70.3 kg)   06/26/17 155 lb 3.2 oz (70.4 kg)              We Performed the Following     UA reflex to Microscopic and Culture     Urine Culture Aerobic Bacterial     Urine Microscopic          Today's Medication Changes          These changes are accurate as of 8/10/18  3:41 PM.  If you have any questions, ask your nurse or doctor.               Start taking these medicines.        Dose/Directions    nitroFURantoin (macrocrystal-monohydrate) 100 MG capsule   Commonly known as:  MACROBID   Used for:  Acute cystitis with hematuria   Started by:  Verenice Santacruz MD        Dose:  100 mg   Take 1 capsule (100 mg) by mouth 2 times daily for 7 days   Quantity:  14 capsule   Refills:  0            Where to get your medicines      These medications were sent to [facility] [address] [place] [address]  [address] [place] [address]     Phone:  [phone]     nitroFURantoin (macrocrystal-monohydrate) 100 MG capsule                Primary Care Provider Office Phone # Fax #    [facility] [phone] [address] [facility] [address]        Equal Access to Services     PABLO WILSON AH: Hadii aad ku hadasho Soomaali, waaxda luqadaha, qaybta kaalmada adeegpadminida, jack bobby. So [facility] [phone].    ATENCIÓN: Si habla español, tiene a arellano disposición servicios gratuitos de asistencia lingüística. Llame al [phone].    We comply with applicable federal civil rights laws and [place] laws. We do not discriminate on the basis of race, color, national origin, age, disability, sex, sexual orientation, or gender identity.            Thank you!     Thank you for choosing [facility]  for your care. Our goal is always to provide you with excellent care. Hearing back  from our patients is one way we can continue to improve our services. Please take a few minutes to complete the written survey that you may receive in the mail after your visit with us. Thank you!             Your Updated Medication List - Protect others around you: Learn how to safely use, store and throw away your medicines at www.disposemymeds.org.          This list is accurate as of 8/10/18  3:41 PM.  Always use your most recent med list.                   Brand Name Dispense Instructions for use Diagnosis    albuterol 108 (90 Base) MCG/ACT inhaler    PROAIR HFA/PROVENTIL HFA/VENTOLIN HFA    1 Inhaler    Inhale 2 puffs into the lungs every 4 hours as needed for shortness of breath / dyspnea or wheezing    Chronic obstructive pulmonary disease, unspecified COPD type (H)       calcium + D 600-200 MG-UNIT Tabs   Generic drug:  calcium carbonate-vitamin D      Take 1 tablet by mouth daily.        clonazePAM 0.5 MG tablet    klonoPIN    30 tablet    TAKE ONE TABLET BY MOUTH TWICE DAILY AS NEEDED FOR ANXIETY    Anxiety       leucovorin 5 MG tablet    WELLCOVORIN     Take 5 mg by mouth once a week Once per week, after methotrexate        methotrexate 2.5 MG tablet CHEMO      8 tablets weekly        nitroFURantoin (macrocrystal-monohydrate) 100 MG capsule    MACROBID    14 capsule    Take 1 capsule (100 mg) by mouth 2 times daily for 7 days    Acute cystitis with hematuria       OVER-THE-COUNTER     1 Bottle    Place 1 drop into both eyes 2 times daily. Soothe XP artificial tears    Dry eye syndrome       pantoprazole 40 MG EC tablet    PROTONIX     Take 40 mg by mouth daily        sertraline 100 MG tablet    ZOLOFT    135 tablet    TAKE ONE & ONE-HALF TABLETS BY MOUTH ONCE DAILY    Anxiety       simvastatin 20 MG tablet    ZOCOR    90 tablet    Take 1 tablet (20 mg) by mouth At Bedtime    Hyperlipidemia LDL goal <130       tocilizumab 162 MG/0.9ML subcutaneous injection    ACTEMRA     Inject 285 mg Subcutaneous every  28 days        zoledronic Acid 5 MG/100ML Soln infusion    RECLAST     Inject 5 mg into the vein once yearly

## 2018-08-10 NOTE — PATIENT INSTRUCTIONS
Understanding Urinary Tract Infections (UTIs)  Most UTIs are caused by bacteria, although they may also be caused by viruses or fungi. Bacteria from the bowel are the most common source of infection. The infection may start because of any of the following:    Sexual activity. During sex, bacteria can travel from the penis, vagina, or rectum into the urethra.     Bacteria on the skin outside the rectum may travel into the urethra. This is more common in women since the rectum and urethra are closer to each other than in men. Wiping from front to back after using the toilet and keeping the area clean can help prevent germs from getting to the urethra.    Blockage of urine flow through the urinary tract. If urine sits too long, germs may start to grow out of control.      Parts of the urinary tract  The infection can occur in any part of the urinary tract.    The kidneys collect and store urine.    The ureters carry urine from the kidneys to the bladder.    The bladder holds urine until you are ready to let it out.    The urethra carries urine from the bladder out of the body. It is shorter in women, so bacteria can move through it more easily. The urethra is longer in men, so a UTI is less likely to reach the bladder or kidneys in men.  Date Last Reviewed: 1/1/2017 2000-2017 [employer]. [address] [place] [address], [place] [address]. All rights reserved. This information is not intended as a substitute for professional medical care. Always follow your healthcare professional's instructions.

## 2018-08-10 NOTE — PROGRESS NOTES
SUBJECTIVE:   Sofia Shay is a 68 year old female who presents to clinic today for the following health issues:      URINARY TRACT SYMPTOMS      Duration: last night    Description  frequency, urgency, odor and retention    Intensity:  moderate    Accompanying signs and symptoms:  Fever/chills: no   Flank pain no   Nausea and vomiting: no   Vaginal symptoms: none  Abdominal/Pelvic Pain: YES- abdominal    History  History of frequent UTI's: no   History of kidney stones: no   Sexually Active: no   Possibility of pregnancy: No    Precipitating or alleviating factors: None    Therapies tried and outcome: none       Patient is a smoker for 50+ years, still smoking 1/2-1 PPD without any desire to quit at this time     HEALTH CARE MAINTENANCE: Overdue for a Physical/Wellness Exam with recommended screening tests for age .   -------------------------------------    Problem list and histories reviewed & adjusted, as indicated.  Additional history: as documented    Patient Active Problem List   Diagnosis     Mild major depression (H)     RA (rheumatoid arthritis) (H)     NICOLE (generalized anxiety disorder)     Tobacco abuse     Advanced directives, counseling/discussion     COPD (chronic obstructive pulmonary disease) (H)     Health Care Home     PXF (pseudoexfoliation of lens capsule) right     Hyperlipidemia LDL goal <130     Nuclear sclerotic cataract of both eyes     Past Surgical History:   Procedure Laterality Date     D & C       HYSTERECTOMY, PAP NO LONGER INDICATED       SALPINGO OOPHORECTOMY,R/L/RUTH         Social History   Substance Use Topics     Smoking status: Current Every Day Smoker     Packs/day: 0.50     Types: Cigarettes     Smokeless tobacco: Never Used      Comment: not ready to quit/declined quit info 12/20/16     Alcohol use No     Family History   Problem Relation Age of Onset     Hypertension Father      Cardiovascular Father      Cancer Father      lung     Diabetes Maternal Grandmother       Cancer Mother      lung     Hypertension Mother      Macular Degeneration Paternal Aunt      Cerebrovascular Disease No family hx of      Thyroid Disease No family hx of      Glaucoma No family hx of          Current Outpatient Prescriptions   Medication Sig Dispense Refill     albuterol (PROAIR HFA/PROVENTIL HFA/VENTOLIN HFA) 108 (90 BASE) MCG/ACT Inhaler Inhale 2 puffs into the lungs every 4 hours as needed for shortness of breath / dyspnea or wheezing 1 Inhaler 5     Calcium Carbonate-Vitamin D (CALCIUM + D) 600-200 MG-UNIT per tablet Take 1 tablet by mouth daily.       clonazePAM (KLONOPIN) 0.5 MG tablet TAKE ONE TABLET BY MOUTH TWICE DAILY AS NEEDED FOR ANXIETY 30 tablet 0     leucovorin (WELLCOVORIN) 5 MG tablet Take 5 mg by mouth once a week Once per week, after methotrexate       METHOTREXATE 2.5 MG OR TABS 8 tablets weekly       nitroFURantoin, macrocrystal-monohydrate, (MACROBID) 100 MG capsule Take 1 capsule (100 mg) by mouth 2 times daily for 7 days 14 capsule 0     OVER-THE-COUNTER Place 1 drop into both eyes 2 times daily. Soothe XP artificial tears 1 Bottle      pantoprazole (PROTONIX) 40 MG enteric coated tablet Take 40 mg by mouth daily       sertraline (ZOLOFT) 100 MG tablet TAKE ONE & ONE-HALF TABLETS BY MOUTH ONCE DAILY 135 tablet 3     simvastatin (ZOCOR) 20 MG tablet Take 1 tablet (20 mg) by mouth At Bedtime 90 tablet 3     tocilizumab (ACTEMRA) 162 MG/0.9ML subcutaneous injection Inject 285 mg Subcutaneous every 28 days        zoledronic Acid (RECLAST) 5 MG/100ML SOLN Inject 5 mg into the vein once yearly       Allergies   Allergen Reactions     Gabapentin Other (See Comments)     dizzy     Latex Itching     Penicillins Hives       Reviewed and updated as needed this visit by clinical staff       Reviewed and updated as needed this visit by Provider         ROS:  All others are negative except as above.      OBJECTIVE:     /68  Pulse 90  Temp 98  F (36.7  C) (Tympanic)  Wt 156 lb  3.2 oz (70.9 kg)  SpO2 95%  BMI 30.51 kg/m2  Body mass index is 30.51 kg/(m^2).  GENERAL: healthy, alert and no distress  RESP: lungs clear to auscultation - no rales, rhonchi or wheezes  CV: regular rate and rhythm, normal S1 S2, no S3 or S4, no murmur, click or rub, no peripheral edema and peripheral pulses strong  ABDOMEN: soft, suprapubic tenderness but no CVA tenderness , no hepatosplenomegaly, no masses and bowel sounds normal    Diagnostic Test Results:  Reviewed and discussed with patient prior to discharge.  Results for orders placed or performed in visit on 08/10/18   UA reflex to Microscopic and Culture   Result Value Ref Range    Color Urine Yellow     Appearance Urine Clear     Glucose Urine Negative NEG^Negative mg/dL    Bilirubin Urine Negative NEG^Negative    Ketones Urine Negative NEG^Negative mg/dL    Specific Gravity Urine 1.010 1.003 - 1.035    Blood Urine Large (A) NEG^Negative    pH Urine 7.0 5.0 - 7.0 pH    Protein Albumin Urine Negative NEG^Negative mg/dL    Urobilinogen Urine 0.2 0.2 - 1.0 EU/dL    Nitrite Urine Negative NEG^Negative    Leukocyte Esterase Urine Small (A) NEG^Negative    Source Midstream Urine    Urine Microscopic   Result Value Ref Range    WBC Urine 5-10 (A) OTO5^0 - 5 /HPF    RBC Urine 25-50 (A) OTO2^O - 2 /HPF    Squamous Epithelial /LPF Urine Few FEW^Few /LPF    Bacteria Urine Few (A) NEG^Negative /HPF         ASSESSMENT/PLAN:   Sofia was seen today for uti.    Diagnoses and all orders for this visit:    Acute cystitis with hematuria  -     Urine Culture Aerobic Bacterial  -     nitroFURantoin, macrocrystal-monohydrate, (MACROBID) 100 MG capsule; Take 1 capsule (100 mg) by mouth 2 times daily for 7 days    Urinary frequency  -     UA reflex to Microscopic and Culture  -     Urine Microscopic    Personal history of tobacco use, presenting hazards to health  Patient is due for Lung Cancer screening- declines at this time.     Will notify her with urine culture  results.   Recommend a repeat UA in a month to ensure complete resolution of hematuria after treatment, if persistent may consider further evaluation. Patient verbalized understanding.         Follow up if symptoms fail to improve or worsen.      The patient was in agreement with the plan today and had no questions or concerns prior to leaving the clinic.      Schedule a Wellness Exam at earliest convenience.       Verenice Santacruz MD  [facility]

## 2018-08-13 DIAGNOSIS — B96.4 URINARY TRACT INFECTION DUE TO PROTEUS: Primary | ICD-10-CM

## 2018-08-13 DIAGNOSIS — N39.0 URINARY TRACT INFECTION DUE TO PROTEUS: Primary | ICD-10-CM

## 2018-08-13 LAB
BACTERIA SPEC CULT: ABNORMAL
SPECIMEN SOURCE: ABNORMAL

## 2018-08-13 RX ORDER — SULFAMETHOXAZOLE/TRIMETHOPRIM 800-160 MG
1 TABLET ORAL 2 TIMES DAILY
Qty: 14 TABLET | Refills: 0 | Status: SHIPPED | OUTPATIENT
Start: 2018-08-13 | End: 2018-08-20

## 2018-11-09 ENCOUNTER — ALLIED HEALTH/NURSE VISIT (OUTPATIENT)
Dept: FAMILY MEDICINE | Facility: CLINIC | Age: 68
End: 2018-11-09
Payer: COMMERCIAL

## 2018-11-09 DIAGNOSIS — Z23 NEED FOR PROPHYLACTIC VACCINATION AND INOCULATION AGAINST INFLUENZA: Primary | ICD-10-CM

## 2018-11-09 PROCEDURE — G0008 ADMIN INFLUENZA VIRUS VAC: HCPCS

## 2018-11-09 PROCEDURE — 90662 IIV NO PRSV INCREASED AG IM: CPT

## 2018-11-09 NOTE — MR AVS SNAPSHOT
After Visit Summary   11/9/2018    Sofia Shay    MRN: [id number]           Patient Information     Date Of Birth          1950        Visit Information        Provider Department      11/9/2018 10:05 AM [facility]U SHOT [facility]        Today's Diagnoses     Need for prophylactic vaccination and inoculation against influenza    -  1       Follow-ups after your visit        Your next 10 appointments already scheduled     Nov 13, 2018  2:45 PM CST   MA SCREENING DIGITAL BILATERAL with [id number]   [facility] ([facility])    [address] [facility] [address]   [phone]           How do I prepare for my exam? (Food and drink instructions) No Food and Drink Restrictions.  How do I prepare for my exam? (Other instructions) Do not use any powder, lotion or deodorant under your arms or on your breast. If you do, we will ask you to remove it before your exam.  What should I wear: Wear comfortable, two-piece clothing.  How long does the exam take: Most scans will take 15 minutes.  What should I bring: Bring any previous mammograms from other facilities or have them mailed to the breast center.  Do I need a :  No  is needed.  What do I need to tell my doctor: If you have any allergies, tell your care team.  What should I do after the exam: No restrictions, You may resume normal activities.  What is this test: This test is an x-ray of the breast to look for breast disease. The breast is pressed between two plates to flatten and spread the tissue. An X-ray is taken of the breast from different angles.  Who should I call with questions: If you have any questions, please call the Imaging Department where you will have your exam. Directions, parking instructions, and other information is available on our website, [email or web address].  Other information about my exam Three-dimensional (3D) mammograms are available at [facility]  "in [facility], [facility], [place], [facility] and [place].  Health locations include [place] and the [facility] in [place].  Benefits of 3D mammograms include * Improved rate of cancer detection * Decreases your chance of having to go back for more tests, which means fewer: * \"False-positive\" results (This means that there is an abnormal area but it isn't cancer.) * Invasive testing procedures, such as a biopsy or surgery * Can provide clearer images of the breast if you have dense breast tissue.  *3D mammography is an optional exam that anyone can have with a 2D mammogram. It doesn't replace or take the place of a 2D mammogram. 2D mammograms remain an effective screening test for all women.  Not all insurance companies cover the cost of a 3D mammogram. Check with your insurance. Three-dimensional (3D) mammograms are available at [facility] in [facility], [facility], [facility], and [place]. Health locations include [place] and [facility] in [place]. Benefits of 3D mammograms include: - Improved rate of cancer detection - Decreases your chance of having to go back for more tests, which means fewer: - \"False-positive\" results (This means that there is an abnormal area but it isn't cancer.) - Invasive testing procedures, such as a biopsy or surgery - Can provide clearer images of the breast if you have dense breast tissue. 3D mammography is an optional exam that anyone can have with a 2D mammogram. It doesn't replace or take the place of a 2D mammogram. 2D mammograms remain an effective screening test for all women.  Not all insurance companies cover the cost of a 3D mammogram. Check with your insurance.              Who to contact     If you have questions or need follow up information about today's clinic visit or your schedule please contact [facility] directly at " [phone].  Normal or non-critical lab and imaging results will be communicated to you by MyChart, letter or phone within 4 business days after the clinic has received the results. If you do not hear from us within 7 days, please contact the clinic through MyChart or phone. If you have a critical or abnormal lab result, we will notify you by phone as soon as possible.  Submit refill requests through DuckHook Mediahart or call your pharmacy and they will forward the refill request to us. Please allow 3 business days for your refill to be completed.          Additional Information About Your Visit        Care EveryWhere ID     This is your Care EveryWhere ID. This could be used by other organizations to access your [place] medical records  [id number]         Blood Pressure from Last 3 Encounters:   08/10/18 101/68   11/29/17 97/65   06/26/17 96/64    Weight from Last 3 Encounters:   08/10/18 156 lb 3.2 oz (70.9 kg)   11/29/17 155 lb (70.3 kg)   06/26/17 155 lb 3.2 oz (70.4 kg)              We Performed the Following     ADMIN INFLUENZA (For MEDICARE Patients ONLY) []     FLU VACCINE, INCREASED ANTIGEN, PRESV FREE, AGE 65+ [[address]]        Primary Care Provider Office Phone # Fax #    Zulma Ariza ANN MARIE Jones [facility] [phone] [address] [facility] [address]        Equal Access to Services     PABLO WILSON : Hadii aad ku hadasho Sonigelali, waaxda luqadaha, qaybta kaalmada adeegyada, jack masters . So [facility] [phone].    ATENCIÓN: Si habla español, tiene a arellano disposición servicios gratuitos de asistencia lingüística. Shemar payton [phone].    We comply with applicable federal civil rights laws and [place] laws. We do not discriminate on the basis of race, color, national origin, age, disability, sex, sexual orientation, or gender identity.            Thank you!     Thank you for choosing [facility]  for your care. Our goal is always to provide  you with excellent care. Hearing back from our patients is one way we can continue to improve our services. Please take a few minutes to complete the written survey that you may receive in the mail after your visit with us. Thank you!             Your Updated Medication List - Protect others around you: Learn how to safely use, store and throw away your medicines at www.disposemymeds.org.          This list is accurate as of 11/9/18 10:11 AM.  Always use your most recent med list.                   Brand Name Dispense Instructions for use Diagnosis    albuterol 108 (90 Base) MCG/ACT inhaler    PROAIR HFA/PROVENTIL HFA/VENTOLIN HFA    1 Inhaler    Inhale 2 puffs into the lungs every 4 hours as needed for shortness of breath / dyspnea or wheezing    Chronic obstructive pulmonary disease, unspecified COPD type (H)       calcium + D 600-200 MG-UNIT Tabs   Generic drug:  calcium carbonate-vitamin D      Take 1 tablet by mouth daily.        clonazePAM 0.5 MG tablet    klonoPIN    30 tablet    TAKE ONE TABLET BY MOUTH TWICE DAILY AS NEEDED FOR ANXIETY    Anxiety       leucovorin 5 MG tablet    WELLCOVORIN     Take 5 mg by mouth once a week Once per week, after methotrexate        methotrexate 2.5 MG tablet CHEMO      8 tablets weekly        OVER-THE-COUNTER     1 Bottle    Place 1 drop into both eyes 2 times daily. Soothe XP artificial tears    Dry eye syndrome       pantoprazole 40 MG EC tablet    PROTONIX     Take 40 mg by mouth daily        sertraline 100 MG tablet    ZOLOFT    135 tablet    TAKE ONE & ONE-HALF TABLETS BY MOUTH ONCE DAILY    Anxiety       simvastatin 20 MG tablet    ZOCOR    90 tablet    Take 1 tablet (20 mg) by mouth At Bedtime    Hyperlipidemia LDL goal <130       tocilizumab 162 MG/0.9ML subcutaneous injection    ACTEMRA     Inject 285 mg Subcutaneous every 28 days        zoledronic Acid 5 MG/100ML Soln infusion    RECLAST     Inject 5 mg into the vein once yearly

## 2018-11-09 NOTE — PROGRESS NOTES

## 2018-11-13 ENCOUNTER — HOSPITAL ENCOUNTER (OUTPATIENT)
Dept: MAMMOGRAPHY | Facility: CLINIC | Age: 68
Discharge: HOME | End: 2018-11-13
Attending: NURSE PRACTITIONER | Admitting: NURSE PRACTITIONER
Payer: MEDICARE

## 2018-11-13 DIAGNOSIS — Z12.31 VISIT FOR SCREENING MAMMOGRAM: ICD-10-CM

## 2018-11-13 PROCEDURE — 77067 SCR MAMMO BI INCL CAD: CPT

## 2019-03-08 DIAGNOSIS — F41.9 ANXIETY: ICD-10-CM

## 2019-03-08 RX ORDER — SERTRALINE HYDROCHLORIDE 100 MG/1
TABLET, FILM COATED ORAL
Qty: 45 TABLET | Refills: 0 | Status: SHIPPED | OUTPATIENT
Start: 2019-03-08 | End: 2019-09-30

## 2019-03-08 NOTE — LETTER
[facility]  [address]  [facility] [place], [place] [address]  [phone]        March 8, 2019  Sofia Shay  [address] [address]    Dear Sofia,    I care about your health and have reviewed your health plan. I have reviewed your medical conditions, medication list, and lab results and am making recommendations based on this review, to better manage your health.    You are in particular need of attention regarding:  -Wellness (Physical) Visit     I am recommending that you:  -schedule a WELLNESS (Physical) APPOINTMENT with me.        Here is a list of Health Maintenance topics that are due now or due soon:  Health Maintenance Due   Topic Date Due     FIT Q1 YR  06/09/1960     ZOSTER IMMUNIZATION (1 of 2) 06/09/1998     MEDICARE ANNUAL WELLNESS VISIT  06/09/2013     COPD ACTION PLAN Q1 YR  06/03/2014     PHQ-9 Q6 MONTHS  06/12/2017     FALL RISK ASSESSMENT  06/26/2018     EYE EXAM Q1 YEAR  11/28/2018     Please call us at [phone] (or use [facility]) to address the above recommendations.     Thank you for trusting [facility] and we appreciate the opportunity to serve you.  We look forward to supporting your healthcare needs in the future.    Healthy Regards,    Zulma Harris CNP/Mary GARCIA RN

## 2019-03-08 NOTE — TELEPHONE ENCOUNTER
"Medication is being filled for 1 time refill only due to:  Patient needs to be seen because it has been more than one year since last visit. Letter mailed to pt.    Requested Prescriptions   Pending Prescriptions Disp Refills     sertraline (ZOLOFT) 100 MG tablet [Pharmacy Med Name: SERTRALINE 100MG TAB] 135 tablet 3     Sig: TAKE ONE & ONE-HALF TABLETS BY MOUTH ONCE DAILY    SSRIs Protocol Failed - 3/8/2019 10:12 AM       Failed - Recent (12 mo) or future (30 days) visit within the authorizing provider's specialty    Patient had office visit in the last 12 months or has a visit in the next 30 days with authorizing provider or within the authorizing provider's specialty.  See \"Patient Info\" tab in inbasket, or \"Choose Columns\" in Meds & Orders section of the refill encounter.             Passed - Medication is active on med list       Passed - Patient is age 18 or older       Passed - No active pregnancy on record       Passed - No positive pregnancy test in last 12 months        Last Written Prescription Date:  11/29/17  Last Fill Quantity: 135,  # refills: 3   Last office visit: 11/29/2017 with prescribing provider:  SUSAN Harris   Future Office Visit:      Mary GARCIA RN      "

## 2019-09-30 ENCOUNTER — OFFICE VISIT (OUTPATIENT)
Dept: FAMILY MEDICINE | Facility: CLINIC | Age: 69
End: 2019-09-30
Payer: MEDICARE

## 2019-09-30 VITALS
OXYGEN SATURATION: 97 % | HEIGHT: 60 IN | HEART RATE: 86 BPM | WEIGHT: 152 LBS | BODY MASS INDEX: 29.84 KG/M2 | SYSTOLIC BLOOD PRESSURE: 100 MMHG | DIASTOLIC BLOOD PRESSURE: 76 MMHG | TEMPERATURE: 98.1 F

## 2019-09-30 DIAGNOSIS — Z23 NEED FOR PROPHYLACTIC VACCINATION AND INOCULATION AGAINST INFLUENZA: ICD-10-CM

## 2019-09-30 DIAGNOSIS — Z12.31 ENCOUNTER FOR SCREENING MAMMOGRAM FOR BREAST CANCER: ICD-10-CM

## 2019-09-30 DIAGNOSIS — K21.9 GASTROESOPHAGEAL REFLUX DISEASE WITHOUT ESOPHAGITIS: ICD-10-CM

## 2019-09-30 DIAGNOSIS — F41.9 ANXIETY: ICD-10-CM

## 2019-09-30 DIAGNOSIS — Z00.00 ENCOUNTER FOR MEDICARE ANNUAL WELLNESS EXAM: ICD-10-CM

## 2019-09-30 DIAGNOSIS — F32.0 MILD MAJOR DEPRESSION (H): ICD-10-CM

## 2019-09-30 DIAGNOSIS — J44.9 CHRONIC OBSTRUCTIVE PULMONARY DISEASE, UNSPECIFIED COPD TYPE (H): Primary | ICD-10-CM

## 2019-09-30 DIAGNOSIS — M06.9 RHEUMATOID ARTHRITIS, INVOLVING UNSPECIFIED SITE, UNSPECIFIED RHEUMATOID FACTOR PRESENCE: ICD-10-CM

## 2019-09-30 PROCEDURE — 99213 OFFICE O/P EST LOW 20 MIN: CPT | Mod: 25 | Performed by: NURSE PRACTITIONER

## 2019-09-30 PROCEDURE — G0008 ADMIN INFLUENZA VIRUS VAC: HCPCS | Performed by: NURSE PRACTITIONER

## 2019-09-30 PROCEDURE — 90662 IIV NO PRSV INCREASED AG IM: CPT | Performed by: NURSE PRACTITIONER

## 2019-09-30 PROCEDURE — 99397 PER PM REEVAL EST PAT 65+ YR: CPT | Mod: 25 | Performed by: NURSE PRACTITIONER

## 2019-09-30 RX ORDER — SERTRALINE HYDROCHLORIDE 100 MG/1
TABLET, FILM COATED ORAL
Qty: 135 TABLET | Refills: 3 | Status: SHIPPED | OUTPATIENT
Start: 2019-09-30 | End: 2020-09-02

## 2019-09-30 RX ORDER — CLONAZEPAM 0.5 MG/1
TABLET ORAL
Qty: 30 TABLET | Refills: 0 | Status: SHIPPED | OUTPATIENT
Start: 2019-09-30 | End: 2020-07-29

## 2019-09-30 RX ORDER — ALBUTEROL SULFATE 90 UG/1
2 AEROSOL, METERED RESPIRATORY (INHALATION) EVERY 4 HOURS PRN
Qty: 1 INHALER | Refills: 5 | Status: SHIPPED | OUTPATIENT
Start: 2019-09-30 | End: 2020-09-02

## 2019-09-30 RX ORDER — PANTOPRAZOLE SODIUM 40 MG/1
40 TABLET, DELAYED RELEASE ORAL DAILY
Qty: 90 TABLET | Refills: 3 | Status: SHIPPED | OUTPATIENT
Start: 2019-09-30 | End: 2020-09-02

## 2019-09-30 ASSESSMENT — MIFFLIN-ST. JEOR: SCORE: 1125.97

## 2019-09-30 NOTE — PATIENT INSTRUCTIONS
Patient Education   Personalized Prevention Plan  You are due for the preventive services outlined below.  Your care team is available to assist you in scheduling these services.  If you have already completed any of these items, please share that information with your care team to update in your medical record.  Health Maintenance Due   Topic Date Due     Zoster (Shingles) Vaccine (1 of 2) 06/09/1998     Annual Wellness Visit  06/09/2013     FALL RISK ASSESSMENT  06/26/2018     Eye Exam  11/28/2018     Flu Vaccine (1) 09/01/2019

## 2019-09-30 NOTE — PROGRESS NOTES
"Subjective     Sofia Shay is a 69 year old female who presents to clinic today for the following health issues:    HPI   Anxiety and Depression Follow-Up    How are you doing with your anxiety since your last visit? No change    Are you having other symptoms that might be associated with anxiety? No    Have you had a significant life event? Health Concerns     Are you feeling depressed? Yes:  a little    Do you have any concerns with your use of alcohol or other drugs? No    Social History     Tobacco Use     Smoking status: Current Every Day Smoker     Packs/day: 0.50     Types: Cigarettes     Smokeless tobacco: Never Used     Tobacco comment: not ready to quit/declined quit info 12/20/16   Substance Use Topics     Alcohol use: No     Alcohol/week: 0.0 standard drinks     Drug use: No     NICOLE-7 SCORE 12/10/2014 4/1/2016 12/12/2016   Total Score 12 - -   Total Score - 9 9     PHQ 4/1/2016 12/12/2016   PHQ-9 Total Score 7 6   Q9: Thoughts of better off dead/self-harm past 2 weeks Not at all Not at all     --  Patient declined to fill out today    COPD Follow-Up    Overall, how are your COPD symptoms since your last clinic visit?  No change- \"fine\"    How much fatigue or shortness of breath do you have when you are walking?  Same as usual    How much shortness of breath do you have when you are resting?  None    How often do you cough? Sometimes    Have you noticed any change in your sputum/phlegm?  No    Have you experienced a recent fever? No    Please describe how far you can walk without stopping to rest:  2-5 blocks    How many flights of stairs are you able to walk up without stopping?  1    Have you had any Emergency Room Visits, Urgent Care Visits, or Hospital Admissions because of your COPD since your last office visit?  No    History   Smoking Status     Current Every Day Smoker     Packs/day: 0.50     Types: Cigarettes   Smokeless Tobacco     Never Used     Comment: not ready to quit/declined quit " "info 16     Lab Results   Component Value Date    FEV1 93 2012    QUZ4VRO 68@ 2007           How many servings of fruits and vegetables do you eat daily?  0-1    On average, how many sweetened beverages do you drink each day (soda, juice, sweet tea, etc)?   6    How many days per week do you miss taking your medication? 0      RA:  Following with rheumatology.  Taking Wellcovorin, methotrexate and on other (can't remember name)  She thinks that these medications are working well.      GERD:  Well controlled with protonix  No side effects.      Annual Wellness Visit    Are you in the first 12 months of your Medicare Part B coverage?  No    Physical Health:    In general, how would you rate your overall physical health? good    If you drink alcohol do you typically have >3 drinks per day or >7 drinks per week? Not Applicable    Do you usually eat at least 4 servings of fruit and vegetables a day, include whole grains & fiber and avoid regularly eating high fat or \"junk\" foods? NO    Needs assistance for the following daily activities: no assistance needed    Which of the following safety concerns are present in your home?  none identified     Hearing impairment: Yes, Need to ask people to speak up or repeat themselves.    In the past 6 months, have you been bothered by leaking of urine? yes    Mental Health:    In general, how would you rate your overall mental or emotional health? fair  PHQ-2 Score:      Do you feel safe in your environment? Yes    Do you have a Health Care Directive? Yes: Patient states has Advance Directive and will bring in a copy to clinic.    Fall risk:  Fallen 2 or more times in the past year?: No  Any fall with injury in the past year?: No    Cognitive Screenin) Repeat 3 items (Leader, Season, Table)    2) Clock draw: NORMAL  3) 3 item recall: Recalls 2 objects   Results: NORMAL clock, 1-2 items recalled: COGNITIVE IMPAIRMENT LESS LIKELY    Mini-CogTM Copyright S " [place]. Licensed by the author for use in [facility]; reprinted with permission (tomy@[facility]). All rights reserved.      Current providers sharing in care for this patient include:   Patient Care Team:  Zulma Harris APRN CNP as PCP - General (Nurse Practitioner)  Zulma Harris APRN CNP as Assigned PCP        Do you have sleep apnea, excessive snoring or daytime drowsiness?: yes- snoring    Reviewed and updated as needed this visit by Provider  Allergies  Meds  Problems         Review of Systems   ROS COMP: Constitutional, HEENT, cardiovascular, pulmonary, gi and gu systems are negative, except as otherwise noted.      Objective    /76 (BP Location: Right arm)   Pulse 86   Temp 98.1  F (36.7  C) (Tympanic)   Ht 1.524 m (5')   Wt 68.9 kg (152 lb)   SpO2 97%   BMI 29.69 kg/m    Body mass index is 29.69 kg/m .  Physical Exam   GENERAL: healthy, alert and no distress  HENT: ear canals and TM's normal, nose and mouth without ulcers or lesions  NECK: no adenopathy, no asymmetry, masses, or scars and thyroid normal to palpation  RESP: lungs clear to auscultation - no rales, rhonchi or wheezes  CV: regular rate and rhythm, normal S1 S2, no S3 or S4, no murmur, click or rub, no peripheral edema and peripheral pulses strong  MS: no gross musculoskeletal defects noted, no edema            Assessment & Plan       ICD-10-CM    1. Chronic obstructive pulmonary disease, unspecified COPD type (H) J44.9 Patient feels that her COPD is well controlled.  She doesn't feel that she needs a daily inhaler at this time.  albuterol (PROAIR HFA/PROVENTIL HFA/VENTOLIN HFA) 108 (90 Base) MCG/ACT inhaler     OFFICE/OUTPT VISIT,EST,LEVL III     2. Rheumatoid arthritis, involving unspecified site, unspecified rheumatoid factor presence (H)   M06.9 Follows with rheumatology  OFFICE/OUTPT VISIT,EST,LEVL III   3. Mild major depression (H) F32.0 Well controlled.  OFFICE/OUTPT VISIT,EST,LEVL III     4. Anxiety  F41.9 Well controlled.  clonazePAM (KLONOPIN) 0.5 MG tablet     sertraline (ZOLOFT) 100 MG tablet     OFFICE/OUTPT VISIT,EST,LEVL III     5. Gastroesophageal reflux disease without esophagitis K21.9 Well controlled.  pantoprazole (PROTONIX) 40 MG EC tablet     OFFICE/OUTPT VISIT,EST,LEVL III   6. Encounter for Medicare annual wellness exam Z00.00    7. Encounter for screening mammogram for breast cancer Z12.31 *MA Screening Digital Bilateral   8. Need for prophylactic vaccination and inoculation against influenza Z23 INFLUENZA (HIGH DOSE) 3 VALENT VACCINE [09692]     Vaccine Administration, Initial [45035]        Tobacco Cessation:   reports that she has been smoking cigarettes. She has been smoking about 0.50 packs per day. She has never used smokeless tobacco.  Tobacco Cessation Action Plan: Information offered: Patient not interested at this time        Return in about 53 weeks (around 10/5/2020) for Annual Wellness Visit.    The risks, benefits and treatment options of prescribed medications or other treatments have been discussed with the patient. The patient verbalized their understanding and should call or follow up if no improvement or if they develop further problems.    ANN MARIE Pulido [facility]

## 2020-07-29 DIAGNOSIS — F41.9 ANXIETY: ICD-10-CM

## 2020-07-29 RX ORDER — CLONAZEPAM 0.5 MG/1
TABLET ORAL
Qty: 30 TABLET | Refills: 0 | Status: SHIPPED | OUTPATIENT
Start: 2020-07-29 | End: 2020-09-02

## 2020-09-02 ENCOUNTER — OFFICE VISIT (OUTPATIENT)
Dept: FAMILY MEDICINE | Facility: CLINIC | Age: 70
End: 2020-09-02
Payer: MEDICARE

## 2020-09-02 VITALS
TEMPERATURE: 97.4 F | HEIGHT: 60 IN | DIASTOLIC BLOOD PRESSURE: 60 MMHG | OXYGEN SATURATION: 97 % | HEART RATE: 96 BPM | SYSTOLIC BLOOD PRESSURE: 108 MMHG | WEIGHT: 156.5 LBS | BODY MASS INDEX: 30.73 KG/M2

## 2020-09-02 DIAGNOSIS — R82.90 NONSPECIFIC FINDING ON EXAMINATION OF URINE: ICD-10-CM

## 2020-09-02 DIAGNOSIS — N30.00 ACUTE CYSTITIS WITHOUT HEMATURIA: Primary | ICD-10-CM

## 2020-09-02 DIAGNOSIS — R39.9 SYMPTOMS INVOLVING URINARY SYSTEM: ICD-10-CM

## 2020-09-02 DIAGNOSIS — K21.9 GASTROESOPHAGEAL REFLUX DISEASE WITHOUT ESOPHAGITIS: ICD-10-CM

## 2020-09-02 DIAGNOSIS — J44.9 CHRONIC OBSTRUCTIVE PULMONARY DISEASE, UNSPECIFIED COPD TYPE (H): ICD-10-CM

## 2020-09-02 DIAGNOSIS — Z23 NEED FOR PROPHYLACTIC VACCINATION AND INOCULATION AGAINST INFLUENZA: ICD-10-CM

## 2020-09-02 DIAGNOSIS — F41.9 ANXIETY: ICD-10-CM

## 2020-09-02 LAB
ALBUMIN UR-MCNC: 30 MG/DL
APPEARANCE UR: CLEAR
BACTERIA #/AREA URNS HPF: ABNORMAL /HPF
BILIRUB UR QL STRIP: NEGATIVE
COLOR UR AUTO: YELLOW
GLUCOSE UR STRIP-MCNC: NEGATIVE MG/DL
HGB UR QL STRIP: ABNORMAL
KETONES UR STRIP-MCNC: NEGATIVE MG/DL
LEUKOCYTE ESTERASE UR QL STRIP: ABNORMAL
NITRATE UR QL: NEGATIVE
NON-SQ EPI CELLS #/AREA URNS LPF: ABNORMAL /LPF
PH UR STRIP: 5.5 PH (ref 5–7)
RBC #/AREA URNS AUTO: ABNORMAL /HPF
SOURCE: ABNORMAL
SP GR UR STRIP: 1.02 (ref 1–1.03)
URATE CRY #/AREA URNS HPF: ABNORMAL /HPF
UROBILINOGEN UR STRIP-ACNC: 0.2 EU/DL (ref 0.2–1)
WBC #/AREA URNS AUTO: ABNORMAL /HPF

## 2020-09-02 PROCEDURE — 99214 OFFICE O/P EST MOD 30 MIN: CPT | Mod: 25 | Performed by: NURSE PRACTITIONER

## 2020-09-02 PROCEDURE — G0008 ADMIN INFLUENZA VIRUS VAC: HCPCS | Performed by: NURSE PRACTITIONER

## 2020-09-02 PROCEDURE — 90662 IIV NO PRSV INCREASED AG IM: CPT | Performed by: NURSE PRACTITIONER

## 2020-09-02 PROCEDURE — 81001 URINALYSIS AUTO W/SCOPE: CPT | Performed by: NURSE PRACTITIONER

## 2020-09-02 PROCEDURE — 87086 URINE CULTURE/COLONY COUNT: CPT | Performed by: NURSE PRACTITIONER

## 2020-09-02 RX ORDER — SERTRALINE HYDROCHLORIDE 100 MG/1
TABLET, FILM COATED ORAL
Qty: 135 TABLET | Refills: 3 | Status: SHIPPED | OUTPATIENT
Start: 2020-09-02 | End: 2021-12-06

## 2020-09-02 RX ORDER — PANTOPRAZOLE SODIUM 40 MG/1
40 TABLET, DELAYED RELEASE ORAL DAILY
Qty: 90 TABLET | Refills: 3 | Status: SHIPPED | OUTPATIENT
Start: 2020-09-02 | End: 2021-12-06

## 2020-09-02 RX ORDER — CLONAZEPAM 0.5 MG/1
TABLET ORAL
Qty: 30 TABLET | Refills: 0 | Status: SHIPPED | OUTPATIENT
Start: 2020-09-02 | End: 2021-07-16

## 2020-09-02 RX ORDER — ALBUTEROL SULFATE 90 UG/1
2 AEROSOL, METERED RESPIRATORY (INHALATION) EVERY 4 HOURS PRN
Qty: 1 INHALER | Refills: 5 | Status: SHIPPED | OUTPATIENT
Start: 2020-09-02 | End: 2023-04-05

## 2020-09-02 RX ORDER — SULFAMETHOXAZOLE/TRIMETHOPRIM 800-160 MG
1 TABLET ORAL 2 TIMES DAILY
Qty: 10 TABLET | Refills: 0 | Status: SHIPPED | OUTPATIENT
Start: 2020-09-02 | End: 2020-09-07

## 2020-09-02 ASSESSMENT — MIFFLIN-ST. JEOR: SCORE: 1141.38

## 2020-09-02 NOTE — LETTER
[facility]  [address] [address]  Phone: [phone]    09/11/20    Sofia Shay  [address] [address]      Demi,        We are writing to inform you of the results from your recent lab work.. We were unable to reach you by phone.    Culture negative for issues.   May finish antibiotics if feeling better. Contact clinic is symptoms continue.    If you have any questions, please do not hesitate to call our office.        Sincerely,      ANN MARIE Pulido CNP

## 2020-09-02 NOTE — PROGRESS NOTES
"Subjective     Sofia Shay is a 70 year old female who presents to clinic today for the following health issues:    HPI   Chief Complaint   Patient presents with     UTI     Flu Shot     Imm/Inj     Flu Shot         Genitourinary - Female  Onset/Duration: x 4 Days   Description:   Painful urination (Dysuria): YES           Frequency: YES  Blood in urine (Hematuria): YES- \"faint amount \"  Delay in urine (Hesitency): no  Intensity: mild  Progression of Symptoms:  improving  Accompanying Signs & Symptoms:  Fever/chills: YES- chills   Flank pain: YES  Nausea and vomiting: no  Vaginal symptoms: none  Abdominal/Pelvic Pain: no  History:   History of frequent UTI s: YES  History of kidney stones: no  Sexually Active: no  Possibility of pregnancy: No  Precipitating or alleviating factors: None  Therapies tried and outcome: Cranberry juice prn (contraindicated in Coumadin patients)      Anxiety Follow-Up    How are you doing with your anxiety since your last visit? Improved     Are you having other symptoms that might be associated with anxiety? No    Have you had a significant life event? No     Are you feeling depressed? No    Do you have any concerns with your use of alcohol or other drugs? No    Social History     Tobacco Use     Smoking status: Current Every Day Smoker     Packs/day: 0.50     Types: Cigarettes     Smokeless tobacco: Never Used     Tobacco comment: not ready to quit/declined quit info 12/20/16   Substance Use Topics     Alcohol use: No     Alcohol/week: 0.0 standard drinks     Drug use: No     NICOLE-7 SCORE 12/10/2014 4/1/2016 12/12/2016   Total Score 12 - -   Total Score - 9 9     PHQ 4/1/2016 12/12/2016   PHQ-9 Total Score 7 6   Q9: Thoughts of better off dead/self-harm past 2 weeks Not at all Not at all         COPD Follow-Up    Overall, how are your COPD symptoms since your last clinic visit?  No change    How much fatigue or shortness of breath do you have when you are walking?  None    How " much shortness of breath do you have when you are resting?  None    How often do you cough? Rarely    Have you noticed any change in your sputum/phlegm?  No    Have you experienced a recent fever? No    Please describe how far you can walk without stopping to rest:  Less than a mile    How many flights of stairs are you able to walk up without stopping?  1    Have you had any Emergency Room Visits, Urgent Care Visits, or Hospital Admissions because of your COPD since your last office visit?  No    History   Smoking Status     Current Every Day Smoker     Packs/day: 0.50     Types: Cigarettes   Smokeless Tobacco     Never Used     Comment: not ready to quit/declined quit info 12/20/16     Lab Results   Component Value Date    FEV1 93 07/19/2012    [id number] 68@ 05/11/2007         Review of Systems   Constitutional, HEENT, cardiovascular, pulmonary, GI, , musculoskeletal, neuro, skin, endocrine and psych systems are negative, except as otherwise noted.      Objective    /60 (BP Location: Right arm, Patient Position: Chair, Cuff Size: Adult Regular)   Pulse 96   Temp 97.4  F (36.3  C) (Tympanic)   Ht 1.524 m (5')   Wt 71 kg (156 lb 8 oz)   SpO2 97%   Breastfeeding No   BMI 30.56 kg/m    Body mass index is 30.56 kg/m .  Physical Exam   GENERAL: healthy, alert and no distress  HENT: normal cephalic/atraumatic and ear canals and TM's normal  NECK: no adenopathy, no asymmetry, masses, or scars and thyroid normal to palpation  RESP: lungs clear to auscultation - no rales, rhonchi or wheezes  CV: regular rate and rhythm, normal S1 S2, no S3 or S4, no murmur, click or rub, no peripheral edema and peripheral pulses strong  MS: no gross musculoskeletal defects noted, no edema  PSYCH: mentation appears normal, affect normal/bright    Results for orders placed or performed in visit on 09/02/20 (from the past 24 hour(s))   *UA reflex to Microscopic and Culture ([facility] [place] [facility]  [place]    Specimen: Midstream Urine   Result Value Ref Range    Color Urine Yellow     Appearance Urine Clear     Glucose Urine Negative NEG^Negative mg/dL    Bilirubin Urine Negative NEG^Negative    Ketones Urine Negative NEG^Negative mg/dL    Specific Gravity Urine 1.020 1.003 - 1.035    Blood Urine Large (A) NEG^Negative    pH Urine 5.5 5.0 - 7.0 pH    Protein Albumin Urine 30 (A) NEG^Negative mg/dL    Urobilinogen Urine 0.2 0.2 - 1.0 EU/dL    Nitrite Urine Negative NEG^Negative    Leukocyte Esterase Urine Moderate (A) NEG^Negative    Source Midstream Urine    Urine Microscopic   Result Value Ref Range    WBC Urine 10-25 (A) OTO5^0 - 5 /HPF    RBC Urine 5-10 (A) OTO2^O - 2 /HPF    Squamous Epithelial /LPF Urine Few FEW^Few /LPF    Bacteria Urine Few (A) NEG^Negative /HPF    Uric Acid Crystals Few (A) NEG^Negative /HPF           Assessment & Plan     Acute cystitis without hematuria  - sulfamethoxazole-trimethoprim (BACTRIM DS) 800-160 MG tablet; Take 1 tablet by mouth 2 times daily for 5 days    Chronic obstructive pulmonary disease, unspecified COPD type (H)  Stable   - albuterol (PROAIR HFA/PROVENTIL HFA/VENTOLIN HFA) 108 (90 Base) MCG/ACT inhaler; Inhale 2 puffs into the lungs every 4 hours as needed for shortness of breath / dyspnea or wheezing    Gastroesophageal reflux disease without esophagitis  Well controlled.  - pantoprazole (PROTONIX) 40 MG EC tablet; Take 1 tablet (40 mg) by mouth daily    Anxiety  Well controlled.  - sertraline (ZOLOFT) 100 MG tablet; TAKE ONE & ONE-HALF TABLETS BY MOUTH ONCE DAILY  - clonazePAM (KLONOPIN) 0.5 MG tablet; Take 1 tablet by mouth twice daily as needed for anxiety    Nonspecific finding on examination of urine  - Urine Culture Aerobic Bacterial    Symptoms involving urinary system  - *UA reflex to Microscopic and Culture ([place] [facility] (except [place] and [place])  - Urine Microscopic    Need for prophylactic vaccination and inoculation against  influenza  - FLUZONE HIGH DOSE 65+  [81210]  - ADMIN INFLUENZA (For MEDICARE Patients ONLY) []     Tobacco Cessation:   reports that she has been smoking cigarettes. She has been smoking about 0.50 packs per day. She has never used smokeless tobacco.  Tobacco Cessation Action Plan: Information offered: Patient not interested at this time      BMI:   Estimated body mass index is 30.56 kg/m  as calculated from the following:    Height as of this encounter: 1.524 m (5').    Weight as of this encounter: 71 kg (156 lb 8 oz).   Weight management plan: Discussed healthy diet and exercise guidelines            Return in about 1 year (around 9/2/2021) for Physical Exam.      The risks, benefits and treatment options of prescribed medications or other treatments have been discussed with the patient. The patient verbalized their understanding and should call or follow up if no improvement or if they develop further problems.    ANN MARIE Pulido [facility]

## 2020-09-04 LAB
BACTERIA SPEC CULT: NORMAL
SPECIMEN SOURCE: NORMAL

## 2020-09-08 ENCOUNTER — NURSE TRIAGE (OUTPATIENT)
Dept: FAMILY MEDICINE | Facility: CLINIC | Age: 70
End: 2020-09-08

## 2020-09-08 NOTE — TELEPHONE ENCOUNTER
Reason for call:  Patient reporting a symptom    Symptom or request: Pt saw SABINO Harris 9/2 for a UTI and she continues to have UTI symptoms, despite finishing antibiotic.  Please call patient and advise.      Duration (how long have symptoms been present): ongoing    Have you been treated for this before? Yes    Additional comments:     Phone Number patient can be reached at:  Home number on file [phone] (home)    Best Time:  any    Can we leave a detailed message on this number:  YES    Call taken on 9/8/2020 at 3:26 PM by Erin Coley

## 2020-09-08 NOTE — TELEPHONE ENCOUNTER
Patient informed of message below from provider.  Declined sooner appointment with another provider, declined Emergency Department. Scheduled 9/14/20 with PCP at 1:20pm.

## 2020-09-08 NOTE — TELEPHONE ENCOUNTER
"S-(situation): Patient is still having UTI symptoms.     B-(background): 9/2/20 Office visit:  Component      Latest Ref Rng & Units 9/2/2020   Color Urine       Yellow   Appearance Urine       Clear   Glucose Urine      NEG:Negative mg/dL Negative   Bilirubin Urine      NEG:Negative Negative   Ketones Urine      NEG:Negative mg/dL Negative   Specific Gravity Urine      1.003 - 1.035 1.020   Blood Urine      NEG:Negative Large (A)   pH Urine      5.0 - 7.0 pH 5.5   Protein Albumin Urine      NEG:Negative mg/dL 30 (A)   Urobilinogen Urine      0.2 - 1.0 EU/dL 0.2   Nitrite Urine      NEG:Negative Negative   Leukocyte Esterase Urine      NEG:Negative Moderate (A)   Source       Midstream Urine   WBC Urine      OTO5:0 - 5 /HPF 10-25 (A)   RBC Urine      OTO2:O - 2 /HPF 5-10 (A)   Squamous Epithelial /LPF Urine      FEW:Few /LPF Few   Bacteria Urine      NEG:Negative /HPF Few (A)   Uric Acid Crystals      NEG:Negative /HPF Few (A)   Specimen Description       Midstream Urine   Culture Micro       >100,000 colonies/mL . . .       Zulma Harris, ANN MARIE CNP   9/4/2020  7:59 AM       Culture negative.   May finish antibiotics if feeling better.   Zulma Harris, SUSAN          A-(assessment): patient states she thinks she had an allergic reaction to bactrim. Patient reports on Sunday 9/6/20 she hallucinated, thought arm was disintegrating, patient states she was very confused.Took all but 1 dose of bactrim, Stopped taking the last dose of bactrim. Hallucinations have resolved. States she has been Sweating profusely, fatigued, hot to the point her hair is wet. Patient reports she feels \"crummy\". Patient reports she still has bladder pressure, and burning with urination. Audible wheezing on phone, patient does have COPD and reports this is her baseline.    R-(recommendations): this writer advised Emergency Department patient states, \"i'm not gonna go anywhere today, I can make an appointment. \" Advised would route to " provider for further recommendations.

## 2020-09-08 NOTE — TELEPHONE ENCOUNTER
Covering for provider:  ER evaluation certainly seems reasonable, I note that she has refused this recommendation.  I see that her urine culture grew only mixed jarrod, so unclear if this is related to UTI or another cause.  Agree with in-clinic re-evaluation of her symptoms; go to ER if symptoms worsen in the meantime.    Thanks,  To Caceres MD

## 2020-09-09 ENCOUNTER — HOSPITAL ENCOUNTER (INPATIENT)
Facility: CLINIC | Age: 70
LOS: 4 days | Discharge: HOME | DRG: 871 | End: 2020-09-14
Attending: EMERGENCY MEDICINE | Admitting: FAMILY MEDICINE
Payer: MEDICARE

## 2020-09-09 DIAGNOSIS — R65.10 SIRS (SYSTEMIC INFLAMMATORY RESPONSE SYNDROME) (H): ICD-10-CM

## 2020-09-09 DIAGNOSIS — E86.0 DEHYDRATION: ICD-10-CM

## 2020-09-09 DIAGNOSIS — D72.825 BANDEMIA: ICD-10-CM

## 2020-09-09 DIAGNOSIS — N17.9 ACUTE RENAL FAILURE, UNSPECIFIED ACUTE RENAL FAILURE TYPE (H): ICD-10-CM

## 2020-09-09 DIAGNOSIS — N28.9 ACUTE KIDNEY INSUFFICIENCY: ICD-10-CM

## 2020-09-09 DIAGNOSIS — Z20.828 EXPOSURE TO SARS-ASSOCIATED CORONAVIRUS: ICD-10-CM

## 2020-09-09 DIAGNOSIS — N10 PYELONEPHRITIS, ACUTE: ICD-10-CM

## 2020-09-09 LAB
BASOPHILS # BLD AUTO: 0.1 10E9/L (ref 0–0.2)
BASOPHILS NFR BLD AUTO: 0.3 %
DIFFERENTIAL METHOD BLD: ABNORMAL
EOSINOPHIL # BLD AUTO: 0 10E9/L (ref 0–0.7)
EOSINOPHIL NFR BLD AUTO: 0 %
ERYTHROCYTE [DISTWIDTH] IN BLOOD BY AUTOMATED COUNT: 14 % (ref 10–15)
HCT VFR BLD AUTO: 38.6 % (ref 35–47)
HGB BLD-MCNC: 13.1 G/DL (ref 11.7–15.7)
IMM GRANULOCYTES # BLD: 0.3 10E9/L (ref 0–0.4)
IMM GRANULOCYTES NFR BLD: 0.7 %
LACTATE BLD-SCNC: 1.2 MMOL/L (ref 0.7–2)
LYMPHOCYTES # BLD AUTO: 1.4 10E9/L (ref 0.8–5.3)
LYMPHOCYTES NFR BLD AUTO: 3.9 %
MCH RBC QN AUTO: 29.7 PG (ref 26.5–33)
MCHC RBC AUTO-ENTMCNC: 33.9 G/DL (ref 31.5–36.5)
MCV RBC AUTO: 88 FL (ref 78–100)
MONOCYTES # BLD AUTO: 2.9 10E9/L (ref 0–1.3)
MONOCYTES NFR BLD AUTO: 8.2 %
NEUTROPHILS # BLD AUTO: 30.4 10E9/L (ref 1.6–8.3)
NEUTROPHILS NFR BLD AUTO: 86.9 %
NRBC # BLD AUTO: 0 10*3/UL
NRBC BLD AUTO-RTO: 0 /100
PLATELET # BLD AUTO: 322 10E9/L (ref 150–450)
RBC # BLD AUTO: 4.41 10E12/L (ref 3.8–5.2)
WBC # BLD AUTO: 35 10E9/L (ref 4–11)

## 2020-09-09 PROCEDURE — 25800030 ZZH RX IP 258 OP 636: Performed by: EMERGENCY MEDICINE

## 2020-09-09 PROCEDURE — C9803 HOPD COVID-19 SPEC COLLECT: HCPCS | Performed by: EMERGENCY MEDICINE

## 2020-09-09 PROCEDURE — 96361 HYDRATE IV INFUSION ADD-ON: CPT | Performed by: EMERGENCY MEDICINE

## 2020-09-09 PROCEDURE — 80053 COMPREHEN METABOLIC PANEL: CPT | Performed by: EMERGENCY MEDICINE

## 2020-09-09 PROCEDURE — U0003 INFECTIOUS AGENT DETECTION BY NUCLEIC ACID (DNA OR RNA); SEVERE ACUTE RESPIRATORY SYNDROME CORONAVIRUS 2 (SARS-COV-2) (CORONAVIRUS DISEASE [COVID-19]), AMPLIFIED PROBE TECHNIQUE, MAKING USE OF HIGH THROUGHPUT TECHNOLOGIES AS DESCRIBED BY CMS-2020-01-R: HCPCS | Performed by: EMERGENCY MEDICINE

## 2020-09-09 PROCEDURE — 99291 CRITICAL CARE FIRST HOUR: CPT | Mod: Z6 | Performed by: EMERGENCY MEDICINE

## 2020-09-09 PROCEDURE — 87040 BLOOD CULTURE FOR BACTERIA: CPT | Performed by: EMERGENCY MEDICINE

## 2020-09-09 PROCEDURE — 25000132 ZZH RX MED GY IP 250 OP 250 PS 637: Mod: GY | Performed by: EMERGENCY MEDICINE

## 2020-09-09 PROCEDURE — 99291 CRITICAL CARE FIRST HOUR: CPT | Mod: 25 | Performed by: EMERGENCY MEDICINE

## 2020-09-09 PROCEDURE — 83605 ASSAY OF LACTIC ACID: CPT | Performed by: EMERGENCY MEDICINE

## 2020-09-09 PROCEDURE — 85025 COMPLETE CBC W/AUTO DIFF WBC: CPT | Performed by: EMERGENCY MEDICINE

## 2020-09-09 RX ORDER — ACETAMINOPHEN 500 MG
1000 TABLET ORAL ONCE
Status: COMPLETED | OUTPATIENT
Start: 2020-09-09 | End: 2020-09-09

## 2020-09-09 RX ADMIN — ACETAMINOPHEN 1000 MG: 500 TABLET, FILM COATED ORAL at 23:21

## 2020-09-09 RX ADMIN — SODIUM CHLORIDE 3000 ML: 9 INJECTION, SOLUTION INTRAVENOUS at 23:21

## 2020-09-09 ASSESSMENT — ENCOUNTER SYMPTOMS
FATIGUE: 1
COUGH: 0
SEIZURES: 0
NECK PAIN: 0
FREQUENCY: 1
CONFUSION: 0
LIGHT-HEADEDNESS: 0
ABDOMINAL PAIN: 0
NAUSEA: 0
RHINORRHEA: 0
BACK PAIN: 0
APPETITE CHANGE: 1
VOMITING: 1
FLANK PAIN: 0
DYSURIA: 1
CHEST TIGHTNESS: 0
NUMBNESS: 0
DIARRHEA: 0
SORE THROAT: 0
CHILLS: 1
NECK STIFFNESS: 0
WOUND: 0
FEVER: 1
CONSTIPATION: 0
WEAKNESS: 0
ACTIVITY CHANGE: 1
SHORTNESS OF BREATH: 0

## 2020-09-09 ASSESSMENT — MIFFLIN-ST. JEOR: SCORE: 1154.99

## 2020-09-10 ENCOUNTER — APPOINTMENT (OUTPATIENT)
Dept: GENERAL RADIOLOGY | Facility: CLINIC | Age: 70
DRG: 871 | End: 2020-09-10
Attending: INTERNAL MEDICINE
Payer: MEDICARE

## 2020-09-10 ENCOUNTER — APPOINTMENT (OUTPATIENT)
Dept: CT IMAGING | Facility: CLINIC | Age: 70
DRG: 871 | End: 2020-09-10
Attending: EMERGENCY MEDICINE
Payer: MEDICARE

## 2020-09-10 PROBLEM — N12 PYELONEPHRITIS: Status: ACTIVE | Noted: 2020-09-10

## 2020-09-10 PROBLEM — I95.9 HYPOTENSION, UNSPECIFIED HYPOTENSION TYPE: Status: ACTIVE | Noted: 2020-09-10

## 2020-09-10 PROBLEM — R00.0 TACHYCARDIA: Status: ACTIVE | Noted: 2020-09-10

## 2020-09-10 PROBLEM — R50.9 ACUTE FEBRILE ILLNESS: Status: ACTIVE | Noted: 2020-09-10

## 2020-09-10 PROBLEM — D72.829 LEUKOCYTOSIS: Status: ACTIVE | Noted: 2020-09-10

## 2020-09-10 PROBLEM — K21.9 GASTROESOPHAGEAL REFLUX DISEASE WITHOUT ESOPHAGITIS: Status: ACTIVE | Noted: 2019-09-30

## 2020-09-10 LAB
ALBUMIN SERPL-MCNC: 2.9 G/DL (ref 3.4–5)
ALBUMIN UR-MCNC: 30 MG/DL
ALP SERPL-CCNC: 100 U/L (ref 40–150)
ALT SERPL W P-5'-P-CCNC: 30 U/L (ref 0–50)
ANION GAP SERPL CALCULATED.3IONS-SCNC: 10 MMOL/L (ref 3–14)
ANION GAP SERPL CALCULATED.3IONS-SCNC: 8 MMOL/L (ref 3–14)
APPEARANCE UR: ABNORMAL
AST SERPL W P-5'-P-CCNC: 26 U/L (ref 0–45)
BACTERIA #/AREA URNS HPF: ABNORMAL /HPF
BILIRUB SERPL-MCNC: 0.5 MG/DL (ref 0.2–1.3)
BILIRUB UR QL STRIP: NEGATIVE
BUN SERPL-MCNC: 13 MG/DL (ref 7–30)
BUN SERPL-MCNC: 14 MG/DL (ref 7–30)
CALCIUM SERPL-MCNC: 7.3 MG/DL (ref 8.5–10.1)
CALCIUM SERPL-MCNC: 8.3 MG/DL (ref 8.5–10.1)
CHLORIDE SERPL-SCNC: 108 MMOL/L (ref 94–109)
CHLORIDE SERPL-SCNC: 97 MMOL/L (ref 94–109)
CO2 SERPL-SCNC: 20 MMOL/L (ref 20–32)
CO2 SERPL-SCNC: 27 MMOL/L (ref 20–32)
COLOR UR AUTO: YELLOW
CREAT SERPL-MCNC: 0.98 MG/DL (ref 0.52–1.04)
CREAT SERPL-MCNC: 1.19 MG/DL (ref 0.52–1.04)
ERYTHROCYTE [DISTWIDTH] IN BLOOD BY AUTOMATED COUNT: 14.4 % (ref 10–15)
GFR SERPL CREATININE-BSD FRML MDRD: 45 ML/MIN/{1.73_M2}
GFR SERPL CREATININE-BSD FRML MDRD: 58 ML/MIN/{1.73_M2}
GLUCOSE SERPL-MCNC: 115 MG/DL (ref 70–99)
GLUCOSE SERPL-MCNC: 150 MG/DL (ref 70–99)
GLUCOSE UR STRIP-MCNC: NEGATIVE MG/DL
HCT VFR BLD AUTO: 33 % (ref 35–47)
HGB BLD-MCNC: 10.9 G/DL (ref 11.7–15.7)
HGB UR QL STRIP: ABNORMAL
KETONES UR STRIP-MCNC: 5 MG/DL
LABORATORY COMMENT REPORT: NORMAL
LEUKOCYTE ESTERASE UR QL STRIP: ABNORMAL
MCH RBC QN AUTO: 29.5 PG (ref 26.5–33)
MCHC RBC AUTO-ENTMCNC: 33 G/DL (ref 31.5–36.5)
MCV RBC AUTO: 89 FL (ref 78–100)
MUCOUS THREADS #/AREA URNS LPF: PRESENT /LPF
NITRATE UR QL: POSITIVE
PH UR STRIP: 6 PH (ref 5–7)
PLATELET # BLD AUTO: 254 10E9/L (ref 150–450)
POTASSIUM SERPL-SCNC: 3.6 MMOL/L (ref 3.4–5.3)
POTASSIUM SERPL-SCNC: 3.8 MMOL/L (ref 3.4–5.3)
PROT SERPL-MCNC: 7.3 G/DL (ref 6.8–8.8)
RBC # BLD AUTO: 3.7 10E12/L (ref 3.8–5.2)
RBC #/AREA URNS AUTO: 21 /HPF (ref 0–2)
SARS-COV-2 RNA SPEC QL NAA+PROBE: NEGATIVE
SARS-COV-2 RNA SPEC QL NAA+PROBE: NORMAL
SODIUM SERPL-SCNC: 132 MMOL/L (ref 133–144)
SODIUM SERPL-SCNC: 138 MMOL/L (ref 133–144)
SOURCE: ABNORMAL
SP GR UR STRIP: 1.01 (ref 1–1.03)
SPECIMEN SOURCE: NORMAL
SPECIMEN SOURCE: NORMAL
SQUAMOUS #/AREA URNS AUTO: 4 /HPF (ref 0–1)
UROBILINOGEN UR STRIP-MCNC: 4 MG/DL (ref 0–2)
WBC # BLD AUTO: 24.5 10E9/L (ref 4–11)
WBC #/AREA URNS AUTO: 147 /HPF (ref 0–5)
YEAST #/AREA URNS HPF: ABNORMAL /HPF

## 2020-09-10 PROCEDURE — 36415 COLL VENOUS BLD VENIPUNCTURE: CPT | Performed by: PHYSICIAN ASSISTANT

## 2020-09-10 PROCEDURE — 87086 URINE CULTURE/COLONY COUNT: CPT | Performed by: EMERGENCY MEDICINE

## 2020-09-10 PROCEDURE — 80048 BASIC METABOLIC PNL TOTAL CA: CPT | Performed by: PHYSICIAN ASSISTANT

## 2020-09-10 PROCEDURE — 96367 TX/PROPH/DG ADDL SEQ IV INF: CPT | Performed by: EMERGENCY MEDICINE

## 2020-09-10 PROCEDURE — 87088 URINE BACTERIA CULTURE: CPT | Performed by: EMERGENCY MEDICINE

## 2020-09-10 PROCEDURE — 74176 CT ABD & PELVIS W/O CONTRAST: CPT

## 2020-09-10 PROCEDURE — 81001 URINALYSIS AUTO W/SCOPE: CPT | Performed by: EMERGENCY MEDICINE

## 2020-09-10 PROCEDURE — 25800030 ZZH RX IP 258 OP 636: Performed by: FAMILY MEDICINE

## 2020-09-10 PROCEDURE — 87800 DETECT AGNT MULT DNA DIREC: CPT | Performed by: EMERGENCY MEDICINE

## 2020-09-10 PROCEDURE — 12000000 ZZH R&B MED SURG/OB

## 2020-09-10 PROCEDURE — 87186 SC STD MICRODIL/AGAR DIL: CPT | Performed by: EMERGENCY MEDICINE

## 2020-09-10 PROCEDURE — 25800030 ZZH RX IP 258 OP 636: Performed by: EMERGENCY MEDICINE

## 2020-09-10 PROCEDURE — 99207 ZZC APP CREDIT; MD BILLING SHARED VISIT: CPT | Performed by: PHYSICIAN ASSISTANT

## 2020-09-10 PROCEDURE — 87040 BLOOD CULTURE FOR BACTERIA: CPT | Performed by: EMERGENCY MEDICINE

## 2020-09-10 PROCEDURE — 99207 ZZC CDG-CODE CATEGORY CHANGED: CPT | Performed by: FAMILY MEDICINE

## 2020-09-10 PROCEDURE — 87077 CULTURE AEROBIC IDENTIFY: CPT | Performed by: EMERGENCY MEDICINE

## 2020-09-10 PROCEDURE — 71045 X-RAY EXAM CHEST 1 VIEW: CPT

## 2020-09-10 PROCEDURE — 96366 THER/PROPH/DIAG IV INF ADDON: CPT | Performed by: EMERGENCY MEDICINE

## 2020-09-10 PROCEDURE — 25000132 ZZH RX MED GY IP 250 OP 250 PS 637: Mod: GY | Performed by: EMERGENCY MEDICINE

## 2020-09-10 PROCEDURE — 25000132 ZZH RX MED GY IP 250 OP 250 PS 637: Mod: GY | Performed by: PHYSICIAN ASSISTANT

## 2020-09-10 PROCEDURE — 96365 THER/PROPH/DIAG IV INF INIT: CPT | Performed by: EMERGENCY MEDICINE

## 2020-09-10 PROCEDURE — 85027 COMPLETE CBC AUTOMATED: CPT | Performed by: PHYSICIAN ASSISTANT

## 2020-09-10 PROCEDURE — 99223 1ST HOSP IP/OBS HIGH 75: CPT | Mod: AI | Performed by: FAMILY MEDICINE

## 2020-09-10 PROCEDURE — 25000128 H RX IP 250 OP 636: Performed by: EMERGENCY MEDICINE

## 2020-09-10 RX ORDER — NALOXONE HYDROCHLORIDE 0.4 MG/ML
.1-.4 INJECTION, SOLUTION INTRAMUSCULAR; INTRAVENOUS; SUBCUTANEOUS
Status: DISCONTINUED | OUTPATIENT
Start: 2020-09-10 | End: 2020-09-10

## 2020-09-10 RX ORDER — NALOXONE HYDROCHLORIDE 0.4 MG/ML
.1-.4 INJECTION, SOLUTION INTRAMUSCULAR; INTRAVENOUS; SUBCUTANEOUS
Status: DISCONTINUED | OUTPATIENT
Start: 2020-09-10 | End: 2020-09-14 | Stop reason: HOSPADM

## 2020-09-10 RX ORDER — PROCHLORPERAZINE MALEATE 5 MG
5 TABLET ORAL EVERY 6 HOURS PRN
Status: DISCONTINUED | OUTPATIENT
Start: 2020-09-10 | End: 2020-09-14 | Stop reason: HOSPADM

## 2020-09-10 RX ORDER — PROCHLORPERAZINE 25 MG
12.5 SUPPOSITORY, RECTAL RECTAL EVERY 12 HOURS PRN
Status: DISCONTINUED | OUTPATIENT
Start: 2020-09-10 | End: 2020-09-14 | Stop reason: HOSPADM

## 2020-09-10 RX ORDER — AMOXICILLIN 250 MG
2 CAPSULE ORAL 2 TIMES DAILY PRN
Status: DISCONTINUED | OUTPATIENT
Start: 2020-09-10 | End: 2020-09-14 | Stop reason: HOSPADM

## 2020-09-10 RX ORDER — AMOXICILLIN 250 MG
1 CAPSULE ORAL 2 TIMES DAILY PRN
Status: DISCONTINUED | OUTPATIENT
Start: 2020-09-10 | End: 2020-09-14 | Stop reason: HOSPADM

## 2020-09-10 RX ORDER — CLONAZEPAM 0.5 MG/1
0.5 TABLET ORAL 2 TIMES DAILY PRN
Status: DISCONTINUED | OUTPATIENT
Start: 2020-09-10 | End: 2020-09-14 | Stop reason: HOSPADM

## 2020-09-10 RX ORDER — ONDANSETRON 4 MG/1
4 TABLET, ORALLY DISINTEGRATING ORAL EVERY 6 HOURS PRN
Status: DISCONTINUED | OUTPATIENT
Start: 2020-09-10 | End: 2020-09-14 | Stop reason: HOSPADM

## 2020-09-10 RX ORDER — PANTOPRAZOLE SODIUM 20 MG/1
40 TABLET, DELAYED RELEASE ORAL DAILY
Status: DISCONTINUED | OUTPATIENT
Start: 2020-09-10 | End: 2020-09-14 | Stop reason: HOSPADM

## 2020-09-10 RX ORDER — LIDOCAINE 40 MG/G
CREAM TOPICAL
Status: DISCONTINUED | OUTPATIENT
Start: 2020-09-10 | End: 2020-09-14 | Stop reason: HOSPADM

## 2020-09-10 RX ORDER — MORPHINE SULFATE 2 MG/ML
2-4 INJECTION, SOLUTION INTRAMUSCULAR; INTRAVENOUS
Status: DISCONTINUED | OUTPATIENT
Start: 2020-09-10 | End: 2020-09-14 | Stop reason: HOSPADM

## 2020-09-10 RX ORDER — POLYETHYLENE GLYCOL 3350 17 G/17G
17 POWDER, FOR SOLUTION ORAL DAILY PRN
Status: DISCONTINUED | OUTPATIENT
Start: 2020-09-10 | End: 2020-09-14 | Stop reason: HOSPADM

## 2020-09-10 RX ORDER — ONDANSETRON 2 MG/ML
4 INJECTION INTRAMUSCULAR; INTRAVENOUS EVERY 6 HOURS PRN
Status: DISCONTINUED | OUTPATIENT
Start: 2020-09-10 | End: 2020-09-14 | Stop reason: HOSPADM

## 2020-09-10 RX ORDER — SODIUM CHLORIDE, SODIUM LACTATE, POTASSIUM CHLORIDE, CALCIUM CHLORIDE 600; 310; 30; 20 MG/100ML; MG/100ML; MG/100ML; MG/100ML
INJECTION, SOLUTION INTRAVENOUS CONTINUOUS
Status: DISCONTINUED | OUTPATIENT
Start: 2020-09-10 | End: 2020-09-11

## 2020-09-10 RX ORDER — ACETAMINOPHEN 325 MG/1
650 TABLET ORAL EVERY 4 HOURS PRN
Status: DISCONTINUED | OUTPATIENT
Start: 2020-09-10 | End: 2020-09-14 | Stop reason: HOSPADM

## 2020-09-10 RX ADMIN — ACETAMINOPHEN 650 MG: 325 TABLET, FILM COATED ORAL at 06:23

## 2020-09-10 RX ADMIN — SERTRALINE HYDROCHLORIDE 150 MG: 100 TABLET ORAL at 08:49

## 2020-09-10 RX ADMIN — CEFEPIME 2 G: 2 INJECTION, POWDER, FOR SOLUTION INTRAVENOUS at 00:14

## 2020-09-10 RX ADMIN — SODIUM CHLORIDE, POTASSIUM CHLORIDE, SODIUM LACTATE AND CALCIUM CHLORIDE: 600; 310; 30; 20 INJECTION, SOLUTION INTRAVENOUS at 18:33

## 2020-09-10 RX ADMIN — ACETAMINOPHEN 650 MG: 325 TABLET, FILM COATED ORAL at 15:58

## 2020-09-10 RX ADMIN — CEFEPIME 2 G: 2 INJECTION, POWDER, FOR SOLUTION INTRAVENOUS at 12:09

## 2020-09-10 RX ADMIN — SODIUM CHLORIDE, POTASSIUM CHLORIDE, SODIUM LACTATE AND CALCIUM CHLORIDE: 600; 310; 30; 20 INJECTION, SOLUTION INTRAVENOUS at 12:04

## 2020-09-10 RX ADMIN — SODIUM CHLORIDE, POTASSIUM CHLORIDE, SODIUM LACTATE AND CALCIUM CHLORIDE 1000 ML: 600; 310; 30; 20 INJECTION, SOLUTION INTRAVENOUS at 08:44

## 2020-09-10 RX ADMIN — VANCOMYCIN HYDROCHLORIDE 1500 MG: 10 INJECTION, POWDER, LYOPHILIZED, FOR SOLUTION INTRAVENOUS at 01:27

## 2020-09-10 RX ADMIN — SODIUM CHLORIDE, POTASSIUM CHLORIDE, SODIUM LACTATE AND CALCIUM CHLORIDE: 600; 310; 30; 20 INJECTION, SOLUTION INTRAVENOUS at 03:59

## 2020-09-10 RX ADMIN — PANTOPRAZOLE SODIUM 40 MG: 20 TABLET, DELAYED RELEASE ORAL at 08:49

## 2020-09-10 RX ADMIN — SODIUM CHLORIDE, POTASSIUM CHLORIDE, SODIUM LACTATE AND CALCIUM CHLORIDE 1000 ML: 600; 310; 30; 20 INJECTION, SOLUTION INTRAVENOUS at 15:52

## 2020-09-10 RX ADMIN — ACETAMINOPHEN 650 MG: 325 TABLET, FILM COATED ORAL at 20:34

## 2020-09-10 RX ADMIN — ACETAMINOPHEN 650 MG: 325 TABLET, FILM COATED ORAL at 12:04

## 2020-09-10 ASSESSMENT — ACTIVITIES OF DAILY LIVING (ADL)
ADLS_ACUITY_SCORE: 13
ADLS_ACUITY_SCORE: 11

## 2020-09-10 ASSESSMENT — MIFFLIN-ST. JEOR: SCORE: 1152.38

## 2020-09-10 NOTE — PROGRESS NOTES
Medication list updated with patient. Patient states she changes he day of week for her Methotrexate based on direction from doctor. Patient unable to tell me last time she has taken medication or the Leucovorin. States she has taken them within the last month.    List accurate.

## 2020-09-10 NOTE — PROGRESS NOTES
PROGRESS NOTE:    Was paged to assess patient this am around 6:45 am due to not maintaining oxygen saturations, dipping into the 70s, and requiring 15 L supplemental oxygen via oxy mask. Patient reports she feels cold, she is shaking and thinks this is why the sensor is not reading right. RT arrived and examined patient. Advised replacement of sensor, and her saturations increased to high 90s. Stat CXR was ordered. She denies complaints of worsening shortness of breath or cough. Reports the chills as her main complaint. She has been febrile and is receiving tylenol. She is currently on cefepime and vanco.       - Continue to tittrate down oxygen as able.    - Continue to monitor.    - Will follow up on XR findings.     Skye Ochoa PA-C on 9/10/2020 at 7:12 AM
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
## 2020-09-10 NOTE — PHARMACY-VANCOMYCIN DOSING SERVICE
Pharmacy Vancomycin Initial Note  Date of Service September 10, 2020  Patient's  1950  70 year old, female    Indication: Sepsis and Urinary Tract Infection    Current estimated CrCl = Estimated Creatinine Clearance: 38.5 mL/min (A) (based on SCr of 1.19 mg/dL (H)).    Creatinine for last 3 days  2020: 11:12 PM Creatinine 1.19 mg/dL    Recent Vancomycin Level(s) for last 3 days  No results found for requested labs within last 72 hours.      Vancomycin IV Administrations (past 72 hours)                   vancomycin (VANCOCIN) 1,500 mg in sodium chloride 0.9 % 250 mL intermittent infusion (mg) 1,500 mg New Bag 09/10/20 0127                Nephrotoxins and other renal medications (From now, onward)    Start     Dose/Rate Route Frequency Ordered Stop    20 0130  vancomycin (VANCOCIN) 1,500 mg in sodium chloride 0.9 % 250 mL intermittent infusion      1,500 mg  over 90 Minutes Intravenous EVERY 24 HOURS 09/10/20 0205      20 2355  vancomycin (VANCOCIN) 1,500 mg in sodium chloride 0.9 % 250 mL intermittent infusion      1,500 mg  over 90 Minutes Intravenous ONCE 20 2352            Contrast Orders - past 72 hours (72h ago, onward)    None                Plan:  1.  Start vancomycin  1500 mg IV q24h.   2.  Goal Trough Level: 15-20 mg/L   3.  Pharmacy will check trough levels as appropriate in 1-3 Days.    4. Serum creatinine levels will be ordered daily for the first week of therapy and at least twice weekly for subsequent weeks.    5. [place] method utilized to dose vancomycin therapy: Method 1    Lui Ludwig [facility]
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
## 2020-09-10 NOTE — ED NOTES
Pts blood pressure has been running 80 range systolic for the last hour. No tachycardia. Fever is gone. Dr Estrada notified for review.

## 2020-09-10 NOTE — PROGRESS NOTES
7:40 AM   This patient presented because of fever that started 3 days prior to admission.  It was up to 104.  She was seen on on September 2 and diagnosed with UTI-however the culture came back with mixed jarrod.  She had been started on Septra this was stopped.    She presented to the emergency room with persistent fever, reported confusion, dizziness and anorexia.  In the emergency room her urine was grossly abnormal and her CT scan showed mild left perinephric stranding.    She was started on cefepime and vancomycin.  Admit creatinine was 1.19 with a GFR 45.  White count was 35,000.    Early this morning it was thought that she was suddenly hypoxic.  However per respiratory therapy it appeared that the problem was getting an accurate reading while she was having rigors.  The Reiger's have stopped, she does not shortness of breath.  I remove the oxygen and she had an O2 sat of 94%.  Lungs are clear.  Portable chest x-ray pending.  Heart rate is 115 in sinus.  BP is soft.  1 L LR ordered.    Pt is on a biologic agent and methotrexate for RA-hold.    Full H&P by ANNELIESE Harris.  Continue current cares.    3:34 PM   bp soft during day  Currently 100/53 hr 101  t 99.6  Will give one liter LR over four hours.  Suspect some degree of septic shock

## 2020-09-10 NOTE — PROGRESS NOTES
"[facility] [facility] ADMISSION NOTE    Patient admitted to room 2314 at approximately 0400 via cart from emergency room. Patient was accompanied by nurse.     Verbal SBAR report received from RASHID Saez prior to patient arrival.     Patient ambulated to bed with stand-by assist. Patient alert and oriented X 3. The patient is not having any pain. 0-10 Pain Scale: 5. Admission vital signs: Blood pressure 110/68, pulse 88, temperature 98.3  F (36.8  C), temperature source Oral, resp. rate 20, height 1.549 m (5' 1\"), weight 70.8 kg (156 lb), SpO2 94 %, not currently breastfeeding. Patient was oriented to plan of care, call light, bed controls, tv, telephone, bathroom and visiting hours.     Risk Assessment    The following safety risks were identified during admission: none. Yellow risk band applied: NO.     Skin Initial Assessment    This writer admitted this patient and was unable to complete a full skin assessment and Venancio score in the Adult PCS flowsheet. Appropriate interventions initiated as needed.     Secondary skin check completed by patient refused.         Education    Patient has a [place] to Observation order: No  Observation education completed and documented: N/A      Soledad Plasencia RN    "

## 2020-09-10 NOTE — PLAN OF CARE
LS clear, oxygen sats 94% on room air. Temperature 101.5, updated Dr. Thakkar. Patient was incontinent of a large loose stool this am. She was up in the chair for breakfast and ate only bites.

## 2020-09-10 NOTE — ED NOTES
Pt states that she has not been feeling well for the last week. She states that she has been tired  And had periods of confusion although she is alert and oriented here. She was seen in clinic a few days ago and told she has a UTI. She states that she was called the next day and told that she didn't. She was never started on antibiotics. She has had fevers at home and has not treated them for two days. She is dehydrated on arrival. She has a fever on arrival to the ED.

## 2020-09-11 ENCOUNTER — APPOINTMENT (OUTPATIENT)
Dept: OCCUPATIONAL THERAPY | Facility: CLINIC | Age: 70
DRG: 871 | End: 2020-09-11
Payer: MEDICARE

## 2020-09-11 ENCOUNTER — APPOINTMENT (OUTPATIENT)
Dept: PHYSICAL THERAPY | Facility: CLINIC | Age: 70
DRG: 871 | End: 2020-09-11
Payer: MEDICARE

## 2020-09-11 LAB
ANION GAP SERPL CALCULATED.3IONS-SCNC: 5 MMOL/L (ref 3–14)
BUN SERPL-MCNC: 11 MG/DL (ref 7–30)
CALCIUM SERPL-MCNC: 7.4 MG/DL (ref 8.5–10.1)
CHLORIDE SERPL-SCNC: 112 MMOL/L (ref 94–109)
CO2 SERPL-SCNC: 25 MMOL/L (ref 20–32)
CREAT SERPL-MCNC: 0.89 MG/DL (ref 0.52–1.04)
ERYTHROCYTE [DISTWIDTH] IN BLOOD BY AUTOMATED COUNT: 14.8 % (ref 10–15)
GFR SERPL CREATININE-BSD FRML MDRD: 64 ML/MIN/{1.73_M2}
GLUCOSE SERPL-MCNC: 110 MG/DL (ref 70–99)
HCT VFR BLD AUTO: 33.7 % (ref 35–47)
HGB BLD-MCNC: 11 G/DL (ref 11.7–15.7)
LACTATE BLD-SCNC: 1.9 MMOL/L (ref 0.7–2)
MCH RBC QN AUTO: 29.3 PG (ref 26.5–33)
MCHC RBC AUTO-ENTMCNC: 32.6 G/DL (ref 31.5–36.5)
MCV RBC AUTO: 90 FL (ref 78–100)
PLATELET # BLD AUTO: 276 10E9/L (ref 150–450)
POTASSIUM SERPL-SCNC: 3.5 MMOL/L (ref 3.4–5.3)
RBC # BLD AUTO: 3.76 10E12/L (ref 3.8–5.2)
SODIUM SERPL-SCNC: 142 MMOL/L (ref 133–144)
WBC # BLD AUTO: 20.6 10E9/L (ref 4–11)

## 2020-09-11 PROCEDURE — 97161 PT EVAL LOW COMPLEX 20 MIN: CPT | Mod: GP | Performed by: PHYSICAL THERAPIST

## 2020-09-11 PROCEDURE — 25000132 ZZH RX MED GY IP 250 OP 250 PS 637: Mod: GY | Performed by: EMERGENCY MEDICINE

## 2020-09-11 PROCEDURE — 25000128 H RX IP 250 OP 636: Performed by: EMERGENCY MEDICINE

## 2020-09-11 PROCEDURE — 83605 ASSAY OF LACTIC ACID: CPT | Performed by: FAMILY MEDICINE

## 2020-09-11 PROCEDURE — 99233 SBSQ HOSP IP/OBS HIGH 50: CPT | Performed by: FAMILY MEDICINE

## 2020-09-11 PROCEDURE — 36415 COLL VENOUS BLD VENIPUNCTURE: CPT | Performed by: PHYSICIAN ASSISTANT

## 2020-09-11 PROCEDURE — 85027 COMPLETE CBC AUTOMATED: CPT | Performed by: PHYSICIAN ASSISTANT

## 2020-09-11 PROCEDURE — 97165 OT EVAL LOW COMPLEX 30 MIN: CPT | Mod: GO

## 2020-09-11 PROCEDURE — 36415 COLL VENOUS BLD VENIPUNCTURE: CPT | Performed by: FAMILY MEDICINE

## 2020-09-11 PROCEDURE — 80048 BASIC METABOLIC PNL TOTAL CA: CPT | Performed by: PHYSICIAN ASSISTANT

## 2020-09-11 PROCEDURE — 12000000 ZZH R&B MED SURG/OB

## 2020-09-11 PROCEDURE — 25800030 ZZH RX IP 258 OP 636: Performed by: EMERGENCY MEDICINE

## 2020-09-11 PROCEDURE — 25000132 ZZH RX MED GY IP 250 OP 250 PS 637: Mod: GY | Performed by: PHYSICIAN ASSISTANT

## 2020-09-11 PROCEDURE — 97116 GAIT TRAINING THERAPY: CPT | Mod: GP | Performed by: PHYSICAL THERAPIST

## 2020-09-11 PROCEDURE — 25000128 H RX IP 250 OP 636: Performed by: FAMILY MEDICINE

## 2020-09-11 RX ORDER — CEFTRIAXONE SODIUM 2 G/50ML
2 INJECTION, SOLUTION INTRAVENOUS EVERY 24 HOURS
Status: DISCONTINUED | OUTPATIENT
Start: 2020-09-11 | End: 2020-09-14 | Stop reason: HOSPADM

## 2020-09-11 RX ORDER — ALBUTEROL SULFATE 90 UG/1
2 AEROSOL, METERED RESPIRATORY (INHALATION) EVERY 4 HOURS PRN
Status: DISCONTINUED | OUTPATIENT
Start: 2020-09-11 | End: 2020-09-14 | Stop reason: HOSPADM

## 2020-09-11 RX ORDER — ALBUTEROL SULFATE 0.83 MG/ML
2.5 SOLUTION RESPIRATORY (INHALATION)
Status: DISCONTINUED | OUTPATIENT
Start: 2020-09-11 | End: 2020-09-14 | Stop reason: HOSPADM

## 2020-09-11 RX ADMIN — ACETAMINOPHEN 650 MG: 325 TABLET, FILM COATED ORAL at 21:59

## 2020-09-11 RX ADMIN — PANTOPRAZOLE SODIUM 40 MG: 20 TABLET, DELAYED RELEASE ORAL at 08:07

## 2020-09-11 RX ADMIN — SODIUM CHLORIDE, POTASSIUM CHLORIDE, SODIUM LACTATE AND CALCIUM CHLORIDE: 600; 310; 30; 20 INJECTION, SOLUTION INTRAVENOUS at 01:18

## 2020-09-11 RX ADMIN — CEFEPIME 2 G: 2 INJECTION, POWDER, FOR SOLUTION INTRAVENOUS at 00:09

## 2020-09-11 RX ADMIN — CEFTRIAXONE SODIUM 2 G: 2 INJECTION, SOLUTION INTRAVENOUS at 12:48

## 2020-09-11 RX ADMIN — ACETAMINOPHEN 650 MG: 325 TABLET, FILM COATED ORAL at 15:42

## 2020-09-11 RX ADMIN — SERTRALINE HYDROCHLORIDE 150 MG: 100 TABLET ORAL at 08:07

## 2020-09-11 RX ADMIN — VANCOMYCIN HYDROCHLORIDE 1500 MG: 10 INJECTION, POWDER, LYOPHILIZED, FOR SOLUTION INTRAVENOUS at 01:18

## 2020-09-11 RX ADMIN — ACETAMINOPHEN 650 MG: 325 TABLET, FILM COATED ORAL at 01:21

## 2020-09-11 ASSESSMENT — MIFFLIN-ST. JEOR: SCORE: 1216.38

## 2020-09-11 ASSESSMENT — ACTIVITIES OF DAILY LIVING (ADL)
ADLS_ACUITY_SCORE: 13
ADLS_ACUITY_SCORE: 13
ADLS_ACUITY_SCORE: 17
PREVIOUS_RESPONSIBILITIES: MEAL PREP;HOUSEKEEPING;LAUNDRY;SHOPPING;YARDWORK;MEDICATION MANAGEMENT;DRIVING
ADLS_ACUITY_SCORE: 13
ADLS_ACUITY_SCORE: 17
ADLS_ACUITY_SCORE: 17

## 2020-09-11 NOTE — PLAN OF CARE
"PT-  Discharge Planner PT   Patient plan for discharge: Home    Current status: Eval completed- Pt ambulated 150 feet x2 with SBA; including up/ down steps w/ SBA. Pt reports minimal activity recently- felt \"wobbley\" w/ ambulation and used IV for support w/ first bout of amb.     Barriers to return to prior living situation: medical stability     Recommendations for discharge: Home, no services    Rationale for recommendations: Weak, but steady ambulating w/ no device       Entered by: Renee Marques 09/11/2020 2:30 PM       "

## 2020-09-11 NOTE — PROGRESS NOTES
[facility]      Subjective:  She feels bet ter.  Some chills overnight.  Fever curve is improved.  No abdominal pain.  She is wheezy-she apparently wheezes at home due to her COPD.  She uses an inhaler.  She feels very weak.    Review of Systems:  CONSTITUTIONAL: Lots of malaise  INTEGUMENTARY/SKIN: NEGATIVE for worrisome rashes, moles or lesions  EYES: NEGATIVE for vision changes or irritation  ENT/MOUTH: NEGATIVE for ear, mouth and throat problems  RESP: Wheezy  BREAST: NEGATIVE for masses, tenderness or discharge  CV: NEGATIVE for chest pain, palpitations or peripheral edema  GI: NEGATIVE for nausea, abdominal pain, heartburn, or change in bowel habits  : NEGATIVE for frequency, dysuria, or hematuria  MUSCULOSKELETAL: NEGATIVE for significant arthralgias or myalgia  NEURO: NEGATIVE for weakness, dizziness or paresthesias  ENDOCRINE: NEGATIVE for temperature intolerance, skin/hair changes  HEME: NEGATIVE for bleeding problems  PSYCHIATRIC: NEGATIVE for changes in mood or affect    Physical Exam:  Vitals Were Reviewed    Patient Vitals for the past 16 hrs:   BP Temp Temp src Pulse Resp SpO2 Weight   09/11/20 0818 139/79 100  F (37.8  C) Oral 96 18 95 % --   09/11/20 0808 -- -- -- 95 -- -- --   09/11/20 0414 -- -- -- -- -- -- 76.9 kg (169 lb 8.5 oz)   09/11/20 0411 124/66 98.4  F (36.9  C) Oral 75 18 94 % --   09/11/20 0116 -- 98.2  F (36.8  C) Oral -- -- -- --   09/10/20 2232 104/53 99.3  F (37.4  C) Oral 91 18 94 % --         Intake/Output Summary (Last 24 hours) at 9/11/2020 1048  Last data filed at 9/11/2020 0118  Gross per 24 hour   Intake 3818.5 ml   Output --   Net 3818.5 ml       GENERAL APPEARANCE: healthy, alert and no distress  EYES: conjunctiva clear, eyes grossly normal  RESP: Expiratory wheezes  CV: regular rate and rhythm, normal S1 S2, no S3 or S4 and no murmur, click or rub   ABDOMEN: soft, nontender, no HSM or masses and bowel sounds normal  MS: no clubbing, cyanosis; no  edema  SKIN: clear without significant rashes or lesions    Lab:  Recent Labs   Lab Test 09/11/20  0454 09/10/20  0800    138   POTASSIUM 3.5 3.6   CHLORIDE 112* 108   CO2 25 20   ANIONGAP 5 10   * 150*   BUN 11 13   CR 0.89 0.98   SURYA 7.4* 7.3*     CBC RESULTS:   Recent Labs   Lab Test 09/11/20  0454 09/10/20  0800   WBC 20.6* 24.5*   RBC 3.76* 3.70*   HGB 11.0* 10.9*   HCT 33.7* 33.0*    254       Results for orders placed or performed during the hospital encounter of 09/09/20 (from the past 24 hour(s))   CBC with platelets   Result Value Ref Range    WBC 20.6 (H) 4.0 - 11.0 10e9/L    RBC Count 3.76 (L) 3.8 - 5.2 10e12/L    Hemoglobin 11.0 (L) 11.7 - 15.7 g/dL    Hematocrit 33.7 (L) 35.0 - 47.0 %    MCV 90 78 - 100 fl    MCH 29.3 26.5 - 33.0 pg    MCHC 32.6 31.5 - 36.5 g/dL    RDW 14.8 10.0 - 15.0 %    Platelet Count 276 150 - 450 10e9/L   Basic metabolic panel   Result Value Ref Range    Sodium 142 133 - 144 mmol/L    Potassium 3.5 3.4 - 5.3 mmol/L    Chloride 112 (H) 94 - 109 mmol/L    Carbon Dioxide 25 20 - 32 mmol/L    Anion Gap 5 3 - 14 mmol/L    Glucose 110 (H) 70 - 99 mg/dL    Urea Nitrogen 11 7 - 30 mg/dL    Creatinine 0.89 0.52 - 1.04 mg/dL    GFR Estimate 64 >60 mL/min/[1.73_m2]    GFR Estimate If Black 75 >60 mL/min/[1.73_m2]    Calcium 7.4 (L) 8.5 - 10.1 mg/dL       Assessment and Plan:    Sofia Shay is a 70 year old female who presents on 9/9/2020 with chills fever, fatigue, weakness.         Pyelonephritis/septic shock/gram neg bacteremia  Patient was seen on 9-2-2020 by primary care provider for concerns of dysuria, increased frequency, hematuria, chills and fever.  At that time she was started on Bactrim DS, she reports taking all but 1 dose. She began having hallucinations on Sunday and thought that it might be an adverse reaction to medication so she stopped. She presented to the ED yesterday evening due to increasing chills and fever. She reports generalized fatigue  and malaise.  However she denies significant urinary complaints at this time, and notes maybe she is not urinating as much as she was before. Urinalysis significant for positive nitrites, moderate blood, large leukocyte esterase, 147 WBC, 21 RBC, few bacteria, few yeast. Was started on maxipime and vancomycin in ED and given a liter of fluids.     Blood culture times one lactose fermenting gram-negative corby  Urine culture pending  T-max 100  WBC 30 5K--20.6  Patient hypotensive 9/10/20 received fluid boluses--BP now 139/79  Stop vancomycin-continue cefepime    Tachycardia  Related to sepsis, improved      COVID-19-PCR negative  Precautions removed.     Mild major depression   NICOLE (generalized anxiety disorder)  Patient managed prior to admission with Klonopin 0.5 mg twice daily as needed for anxiety, Zoloft 150 mg daily.   - Continue home medication.      RA (rheumatoid arthritis)   Patient managed prior to admission with Simponi IV treatment every 8 weeks, methotrexate 7.5 mg every 7 days, wellcovorin 5 mg once per week after methotrexate.  She does follow with a rheumatologist.   -Hold medications while in the hospital.   - Continue outpatient follow-up and management.      COPD (chronic obstructive pulmonary disease)-with some exacerbation   Patient managed prior to admission with albuterol as needed.   - Continue home medication.  Suspect sepsis as cause some exacerbation.  We will not use prednisone at this time.    Tobacco abuse  Patient is a current daily smoker, up to half pack per day.   - She denies wanting a nicotine patch at this time.      Hyperlipidemia LDL goal <130  Noted on problem list.  Not currently on medication.     Gastroesophageal reflux disease without esophagitis  Patient managed prior to admission with Protonix 40 mg daily.   - Continue home medication.     Diet: Low Saturated Fat Na <2400 mg    DVT Prophylaxis: Pneumatic Compression Devices  Muse Catheter: not present  Code Status: Full  code         Disposition Plan  Continue cefepime, follow cultures.  Await urine culture.  Stop IV fluids at this time.  Use bronchodilators for wheezes.  Probably 2 more nights in the hospital.        2:19 PM   verigene shows e coli  Changed atb to rocephin.

## 2020-09-11 NOTE — PLAN OF CARE
"Pt has been up with assist of 1 to the bathroom. C/o on and off rigors throughout shift. Tylenol given every 4 hours prn for fever- last given at 2034. Blood culture cam back positive with gram negative cocci. Is being treated with Vanco and Maxipime. IVF running at 150/hr. Appetite is fair.   /52 (BP Location: Left arm)   Pulse 96   Temp 99.3  F (37.4  C) (Oral)   Resp 18   Ht 1.549 m (5' 1\")   Wt 70.5 kg (155 lb 6.8 oz)   SpO2 96%   BMI 29.37 kg/m    Chata Kebede RN BSN    "

## 2020-09-11 NOTE — PROGRESS NOTES
Antimicrobial Stewardship Team Note     Antimicrobial Stewardship Program - A joint venture between [facility] and  Physicians to optimize antibiotic management.  NOT a formal consult - Restricted Antimicrobial Review     Patient: Sofia Shay  MRN: [id number]  Allergies: Gabapentin; Latex; and Penicillins    Brief Summary: Sofia Shay is a 70 year old female with PMHx significant for COPD, anxiety, GERD, and rheumatoid arthritis (on methotrexate and golimumab) who was admitted on 9/9 with fever, chills, fatigue, and dysuria.     History of Present Illness: Patient was visited her PCP on 9/2 with similar symptoms and was started empirically on Bactrim for a UTI. Her urine culture grew mixed urogenitial jarrod and was told to stop the Bactrim. On presentation, patient was febrile (Tmax 102.4), tachycardic () and otherwise vitally stable. She had a severe leukocytosis (WBC 35.0) with neutrophil predominance, lactate 1.2, and slight PREMA (creatinine 1.19). She was started empirically on vancomycin and cefepime. 9/10 CT a/p with mild left perinephric stranding, no renal or ureteral calculi. CXR was also obtained that showed no acute cardiopulmonary disease. Urine analysis was inflammatory with 147 WBC, large LE, and positive nitrites. Midstream urine culture is pending. 9/9 peripheral blood culture remains without growth. However, 9/10 peripheral blood culture positive for E.coli without resistance markers identified by [facility]. Vancomycin was stopped this morning. Patient remains on cefepime.          Active Anti-infective Medications   (From admission, onward)                 Start     Stop    09/11/20 0130  vancomycin (VANCOCIN) injection  1,500 mg,   Intravenous,   EVERY 24 HOURS     Sepsis, Urinary Tract Infection        --    09/10/20 1200  ceFEPIme  2 g,   Intravenous,   EVERY 12 HOURS     Pyelonephritis, Urinary Tract Infection        --                  Assessment: E.coli  bacteremia due to genitourinary source  The most likely source of patient's bacteremia is a genitourinary source with localized symptoms of dysuria and frequency and imaging suggestive of possible pyelonephritis. She did have an hypoxic episode where she was started on 10L NC, but per notes, respiratory therapy was having trouble getting an accurate reading as patient was having rigors. She has maintained good oxygen saturations on room air and CXR is unrevealing, making a respiratory source of bacteremia less likely. Recommend transitioning from cefepime to ceftriaxone as 99% of E.coli isolates are susceptible per [facility] antibiogram. Pending sensitivities, may transition to a highly bioavailable antibiotic (such as a fluoroquinolone) that would achieve good concentrations in the blood stream as well as kidney tissue.  With continued clinical improvement, would favor a 14-day total antibiotic course given severe leukocytosis and fever on admission rather than a shorter course (I.e., 7 days) as reflected in emerging bacteremia data.    Recommendations:  1.) Stop cefepime and transition to ceftriaxone 2 g IV every 24 hours.   2.) Pending sensitivities, may transition to an oral regimen (I.e., fluoroquinolone) to complete 14-day total course (through 9/24).     Discussed with ID Provider: MD Elmira Bobby, PharmD, [facility]  Pager: [phone]      Vital Signs/Clinical Features:  Vitals         09/09 0700  -  09/10 0659 09/10 0700  -  09/11 0659 09/11 0700  -  09/11 1207   Most Recent    Temp ( F) 98.2 -  102.4    98.2 -  101.5    98.5 -  100     98.5 (36.9)    Pulse 64 -  126    75 -  116    92 -  96     92    Resp   20      18      18     18    BP 85/41 -  124/67    94/55 -  124/66    127/75 -  139/79     127/75    SpO2 (%) 76 -  96    94 -  97    95 -  96     96            Labs  Estimated Creatinine Clearance: 53.6 mL/min (based on SCr of 0.89 mg/dL).  Recent Labs   Lab Test 01/08/14 12/08/15  03/10/16 09/09/20  2312 09/10/20  0800 09/11/20  0454   CR 0.68 0.67 0.65 1.19* 0.98 0.89       Recent Labs   Lab Test 10/28/13 01/08/14 09/09/20  2312 09/10/20  0800 09/11/20  0454   WBC 7.2 11.2 35.0* 24.5* 20.6*   ANEU  --   --  30.4*  --   --    ALYM  --   --  1.4  --   --    CARLITO  --   --  2.9*  --   --    AEOS  --   --  0.0  --   --    HGB 14.3 15.3 13.1 10.9* 11.0*   HCT 42.4 45.7 38.6 33.0* 33.7*   MCV 90.0  --  88 89 90    244 322 254 276       Recent Labs   Lab Test 10/28/13 01/08/14 12/08/15 03/10/16 09/09/20  2312   BILITOTAL  --   --   --   --  0.5   ALKPHOS  --   --   --   --  100   ALBUMIN  --   --   --   --  2.9*   AST 39 36 43 39 26   ALT 24 12 9 18 30                   Culture Results:  7-Day Micro Results       Procedure Component Value Units Date/Time    Blood culture [[id number]]  (Abnormal) Collected:  09/10/20 0017    Order Status:  Completed Lab Status:  Preliminary result Updated:  09/11/20 1117    Specimen:  Blood      Specimen Description Blood Right Arm     Culture Micro Cultured on the 1st day of incubation:  Escherichia coli        Critical Value/Significant Value, preliminary result only, called to and read back by  Suman Churchill RN 2057 9/10/20 AM        Susceptibility testing in progress      (Note)  POSITIVE for E.COLI by Verigene multiplex nucleic acid test. Final  identification and antimicrobial susceptibility testing will be  verified by standard methods. Verigene test will not distinguish  E.coli from Shigella species including S.dysenteriae, S.flexneri,  S.boydii, and S.sonnei. Specimens containing Shigella species or  E.coli will be reported as Positive for E.coli.    Specimen tested with Verigene multiplex, gram-negative blood culture  nucleic acid test for the following targets: Acinetobacter sp.,  Citrobacter sp., Enterobacter sp., Proteus sp., E. coli, K.  pneumoniae/oxytoca, P. aeruginosa, and the following resistance  markers: CTXM, KPC, NDM, VIM, IMP and  OXA.    Critical Value/Significant Value called to and read back by  Suman Velez RN @ 2315. 9/10/20. AV      Urine Culture [[id number]] Collected:  09/10/20 0017    Order Status:  Completed Lab Status:  Preliminary result Updated:  09/11/20 0409    Specimen:  Midstream Urine      Specimen Description Midstream Urine     Special Requests Specimen received in preservative     Culture Micro Culture in progress    Blood culture [[id number]] Collected:  09/09/20 2313    Order Status:  Completed Lab Status:  Preliminary result Updated:  09/11/20 0726    Specimen:  Blood      Specimen Description Blood Left Hand     Culture Micro No growth after 1 day            Recent Labs   Lab Test 12/12/16  1347 08/10/18  1515 09/02/20  1320 09/10/20  0017   URINEPH 5.0 7.0 5.5 6.0   NITRITE Negative Negative Negative Positive*   LEUKEST Trace* Small* Moderate* Large*   WBCU 2-5* 5-10* 10-25* 147*                         Imaging: Xr Chest Port 1 View    Result Date: 9/10/2020  XR CHEST PORT 1 VW 9/10/2020 7:54 AM HISTORY: Short of breath. COMPARISON: None.     IMPRESSION: A single view of the chest shows no acute cardiopulmonary disease. Mild interstitial scarring is noted. LAURA MENDOZA MD    Abd/pelvis Ct - No Contrast - Stone Protocol    Result Date: 9/10/2020  EXAM: CT ABDOMEN AND PELVIS WITHOUT CONTRAST - RENAL STONE PROTOCOL LOCATION: [facility] DATE/TIME: 9/10/2020 2:05 AM INDICATION: Urinary tract infection. COMPARISON: None. TECHNIQUE: CT scan of the abdomen and pelvis was performed without oral or intravenous contrast. Multiplanar reformats were obtained. Dose reduction techniques were used. CONTRAST: None. FINDINGS: LOWER CHEST: Coronary artery calcification. HEPATOBILIARY: Mild to moderate diffuse fatty infiltration of the liver. Probable tiny gallstone in the gallbladder. SPLEEN: Borderline splenomegaly. PANCREAS: Unremarkable. ADRENAL GLANDS: Unremarkable. KIDNEYS/BLADDER: Mild left perinephric stranding.  No visualized renal or ureteral calculi. No convincing significant dilatation of the intrarenal collecting systems or ureters bilaterally. No visualized bladder calculi. BOWEL: Several colonic diverticula are present without evidence of diverticulitis. Normal appendix. LYMPH NODES: Unremarkable. VASCULATURE: Atherosclerotic calcification in the abdominal aorta.     IMPRESSION: 1. Mild left perinephric stranding. No visualized renal or ureteral calculi or convincing evidence of ureteral obstruction. These findings could relate to left pyelonephritis or a recently passed left ureteral calculus. 2. No other cause of acute pain identified in the abdomen or pelvis. 3. Colonic diverticulosis without evidence of diverticulitis.

## 2020-09-11 NOTE — PROGRESS NOTES
"   09/11/20 1300   Quick Adds   Type of Visit Initial PT Evaluation   Living Environment   Lives With alone   Living Arrangements house   Home Accessibility stairs within home   Number of Stairs, Within Home, Primary   (multi-level home. 6 stairs to each level. )   Stair Railings, Within Home, Primary railings on both sides of stairs   Functional Level Prior   Ambulation 0-->independent   Transferring 0-->independent   Toileting 0-->independent   Bathing 0-->independent   Communication 0-->understands/communicates without difficulty   Swallowing 0-->swallows foods/liquids without difficulty   Cognition 0 - no cognition issues reported   Fall history within last six months no   Prior Functional Level Comment PLOF- Indep amb w/ no deive   General Information   Onset of Illness/Injury or Date of Surgery - Date 09/10/20   Referring Physician Bijan   Patient/Family Goals Statement return home   Pertinent History of Current Problem (include personal factors and/or comorbidities that impact the POC) 70 year old female who presents on 9/9/2020 with chills fever, fatigue, weakness.; yelonephritis- past medical history of COPD, anxiety, depression, rheumatoid arthritis, GERD   Precautions/Limitations no known precautions/limitations   General Observations Pt reports improvment- reports feeling wobbley w/ amb . longer distances   Pain Assessment   Patient Currently in Pain No   Range of Motion (ROM)   ROM Comment WFL    Strength   Strength Comments WFL, mild  functional weakness noted   Bed Mobility   Bed Mobility Comments indep   Transfer Skills   Transfer Comments Indep   Gait   Gait Comments Pt ambulated 150 feet x2 with SBA; including up/ down steps w/ SBA. Pt reports minimal activity recently- felt \"wobbley\" w/ ambulation and used IV for support w/ first bout of amb- had seated rest break b/w bouts of amb-   pt als up amb in her room to/ from BR w/o difficulty    Balance   Balance Comments good dynamic standing balance " "  General Therapy Interventions   Planned Therapy Interventions gait training;strengthening   Clinical Impression   Criteria for Skilled Therapeutic Intervention yes, treatment indicated   PT Diagnosis Generalized weakness   Influenced by the following impairments Decreased strength/ activity tolerance   Functional limitations due to impairments decreased amb. status   Clinical Presentation Stable/Uncomplicated   Clinical Presentation Rationale clinical judgement   Clinical Decision Making (Complexity) Low complexity   Therapy Frequency Daily   Predicted Duration of Therapy Intervention (days/wks) 1 day   Anticipated Discharge Disposition Home   Risk & Benefits of therapy have been explained Yes   Patient, Family & other staff in agreement with plan of care Yes   [facility] AM-PAC  \"6 Clicks\" V.2 Basic Mobility Inpatient Short Form   1. Turning from your back to your side while in a flat bed without using bedrails? 4 - None   2. Moving from lying on your back to sitting on the side of a flat bed without using bedrails? 4 - None   3. Moving to and from a bed to a chair (including a wheelchair)? 4 - None   4. Standing up from a chair using your arms (e.g., wheelchair, or bedside chair)? 4 - None   5. To walk in hospital room? 4 - None   6. Climbing 3-5 steps with a railing? 3 - A Little   Basic Mobility Raw Score (Score out of 24.Lower scores equate to lower levels of function) 23   Total Evaluation Time   Total Evaluation Time (Minutes) 10     "

## 2020-09-11 NOTE — PROGRESS NOTES
09/11/20 1000   Quick Adds   Type of Visit Initial Occupational Therapy Evaluation   Living Environment   Lives With alone   Living Arrangements house   Home Accessibility stairs within home   Number of Stairs, Within Home, Primary   (multi-level home. 6 stairs to each level. )   Stair Railings, Within Home, Primary railings on both sides of stairs   Living Environment Comment has close neighbors that check in on needs.    Functional Level   Ambulation 0-->independent   Transferring 0-->independent   Toileting 0-->independent   Bathing 0-->independent   Dressing 0-->independent   Eating 0-->independent   Communication 0-->understands/communicates without difficulty   Swallowing 0-->swallows foods/liquids without difficulty   Cognition 0 - no cognition issues reported   Fall history within last six months no   General Information   Onset of Illness/Injury or Date of Surgery - Date 09/09/20   Referring Physician Boo Thakkar MD   Patient/Family Goals Statement to return home.    Additional Occupational Profile Info/Pertinent History of Current Problem Sofia Shay is a 70 year old female who presents on 9/9/2020 with chills fever, fatigue, weakness.   Precautions/Limitations no known precautions/limitations   Cognitive Status Examination   Orientation orientation to person, place and time   Level of Consciousness alert   Follows Commands (Cognition) WNL   Memory intact   Range of Motion (ROM)   ROM Comment B UE ROM: WNL   Strength   Strength Comments B UE strength: WNL for ADLs   Transfer Skill: Bed to Chair/Chair to Bed   Level of [place]: Bed to Chair independent   Transfer Skill: Sit to Stand   Level of [place]: Sit/Stand independent   Transfer Skill: Toilet Transfer   Level of [place]: Toilet independent   Upper Body Dressing   Level of [place]: Dress Upper Body independent   Lower Body Dressing   Level of [place]: Dress Lower Body independent   Toileting   Level of  "[place]: Toilet independent   Grooming   Level of [place]: Grooming independent   Eating/Self Feeding   Level of [place]: Eating independent   Instrumental Activities of Daily Living (IADL)   Previous Responsibilities meal prep;housekeeping;laundry;shopping;yardwork;medication management;driving   IADL Comments pt states she may stay with daughter so she can assist with IADLs upon discharge.    Clinical Impression   Criteria for Skilled Therapeutic Interventions Met evaluation only   OT Diagnosis assess ADLs for safe discharge recommendations    Influenced by the following impairments weakness    Clinical Decision Making (Complexity) Low complexity   Anticipated Discharge Disposition Home   Risks and Benefits of Treatment have been explained. Yes   Patient, Family & other staff in agreement with plan of care Yes   [facility] AM-PAC  \"6 Clicks\" Daily Activity Inpatient Short Form   1. Putting on and taking off regular lower body clothing? 4 - None   2. Bathing (including washing, rinsing, drying)? 4 - None   3. Toileting, which includes using toilet, bedpan or urinal? 4 - None   4. Putting on and taking off regular upper body clothing? 4 - None   5. Taking care of personal grooming such as brushing teeth? 4 - None   6. Eating meals? 4 - None   Daily Activity Raw Score (Score out of 24.Lower scores equate to lower levels of function) 24   Total Evaluation Time   Total Evaluation Time (Minutes) 20     "

## 2020-09-11 NOTE — PROVIDER NOTIFICATION
MD Contacted: text paged    Message: Room 2314 - pt triggered sepsis protocol - temp 101.3 oral, tachy at 106, increased resp and chills.  Lab and vital protocol ordered.    Treatment: Tylenol 650 mg PO given    RASHID Jeffrey Dr. responded - continue to monitor - awaiting lactic results, no fluids at this time due to already being treated for sepsis.

## 2020-09-11 NOTE — PLAN OF CARE
Patient alert and orientated. Vital signs stable. On room air. Afebrile. PRN tylenol given for a headache. Up with stand by assist.      Patient awoke sweaty twice overnight and needed her gown changed. Temp was 98.2 and 98.4 at those times. Scheduled IV antibiotics given. IV fluids running.     Patient able to sleep the majority of the night.

## 2020-09-11 NOTE — CONSULTS
CARE TRANSITION SOCIAL WORK INITIAL ASSESSMENT:          Met with: Patient and Family.      DATA  Active Problems:    Mild major depression (H)    RA (rheumatoid arthritis) (H)    COPD (chronic obstructive pulmonary disease) (H)    NICOLE (generalized anxiety disorder)    Tobacco abuse    Hyperlipidemia LDL goal <130    Gastroesophageal reflux disease without esophagitis    Pyelonephritis    Acute febrile illness    Hypotension, unspecified hypotension type    Tachycardia    Leukocytosis       [facility]  Primary Care MD Name: Kimberly  Contact information and PCP information verified: Yes      ASSESSMENT  Cognitive Status: awake, alert and oriented.       Resources List: Transitional Care, Skilled Nursing Facility, Home Care              Description of Support System: Supportive, Involved   Who is your support system?: Children   Support Assessment: Adequate family and caregiver support, Adequate social supports   Insurance Concerns: No Insurance issues identified  Living Arrangements: house        This writer met with pt introduced self and role. Discussed discharge planning and medicare guidelines in regards to home care and SNF benefits. Pt/family was provided with the Medicare Compare list for SNF and Home Care, discussed associated star ratings to assist with choice for referrals/discharge planning Yes    Education was given to pt/family that star ratings are updated/maintained by Medicare and can be reviewed by visiting www.medicare.gov Yes    This writer spoke with pt regarding discharge planning. Pt reports that she currently lives at home alone. Her main goal is to return there however; she thinks she might be staying with her dtr on discharge.     Explored home care and TCU, pt reports she is not interested in that at this time, she states she is starting to feel much stronger.     Pt will return home when stable.       PLAN    Return home when stable        YUDELKA Hinton  [facility] [phone]

## 2020-09-11 NOTE — PROGRESS NOTES
Notified MD Pelletier at 2115 PM regarding critical results read back.      Spoke with: n/a - text page sent via [employer]    Orders: awaiting response.    Comments: + BC received on Rt arm draw today, 9/10, at 0017. BC positive for Gram Negative rods.    Pt's nurse Chata also notified.    Suman Churchill RN on 9/10/2020 at 9:22 PM

## 2020-09-11 NOTE — PLAN OF CARE
Temperature 100.0 max this shift. Patient in steady on her feet. She is drinking well. IVF saline locked. Antibiotics were changed to Rocephin today for pyelo/sepsis.

## 2020-09-12 ENCOUNTER — APPOINTMENT (OUTPATIENT)
Dept: PHYSICAL THERAPY | Facility: CLINIC | Age: 70
DRG: 871 | End: 2020-09-12
Payer: MEDICARE

## 2020-09-12 LAB
ANION GAP SERPL CALCULATED.3IONS-SCNC: 8 MMOL/L (ref 3–14)
BACTERIA SPEC CULT: ABNORMAL
BUN SERPL-MCNC: 9 MG/DL (ref 7–30)
CALCIUM SERPL-MCNC: 7.8 MG/DL (ref 8.5–10.1)
CHLORIDE SERPL-SCNC: 109 MMOL/L (ref 94–109)
CO2 SERPL-SCNC: 24 MMOL/L (ref 20–32)
CREAT SERPL-MCNC: 0.8 MG/DL (ref 0.52–1.04)
ERYTHROCYTE [DISTWIDTH] IN BLOOD BY AUTOMATED COUNT: 14.7 % (ref 10–15)
GFR SERPL CREATININE-BSD FRML MDRD: 73 ML/MIN/{1.73_M2}
GLUCOSE SERPL-MCNC: 111 MG/DL (ref 70–99)
HCT VFR BLD AUTO: 33.3 % (ref 35–47)
HGB BLD-MCNC: 11.5 G/DL (ref 11.7–15.7)
LACTATE BLD-SCNC: 1.1 MMOL/L (ref 0.7–2)
Lab: ABNORMAL
MCH RBC QN AUTO: 30 PG (ref 26.5–33)
MCHC RBC AUTO-ENTMCNC: 34.5 G/DL (ref 31.5–36.5)
MCV RBC AUTO: 87 FL (ref 78–100)
PLATELET # BLD AUTO: 320 10E9/L (ref 150–450)
POTASSIUM SERPL-SCNC: 3.2 MMOL/L (ref 3.4–5.3)
POTASSIUM SERPL-SCNC: 3.8 MMOL/L (ref 3.4–5.3)
RBC # BLD AUTO: 3.83 10E12/L (ref 3.8–5.2)
SODIUM SERPL-SCNC: 141 MMOL/L (ref 133–144)
SPECIMEN SOURCE: ABNORMAL
SPECIMEN SOURCE: ABNORMAL
WBC # BLD AUTO: 18.1 10E9/L (ref 4–11)

## 2020-09-12 PROCEDURE — 36415 COLL VENOUS BLD VENIPUNCTURE: CPT | Performed by: FAMILY MEDICINE

## 2020-09-12 PROCEDURE — 25000128 H RX IP 250 OP 636: Performed by: FAMILY MEDICINE

## 2020-09-12 PROCEDURE — 25000132 ZZH RX MED GY IP 250 OP 250 PS 637: Mod: GY | Performed by: PHYSICIAN ASSISTANT

## 2020-09-12 PROCEDURE — 25000132 ZZH RX MED GY IP 250 OP 250 PS 637: Mod: GY | Performed by: EMERGENCY MEDICINE

## 2020-09-12 PROCEDURE — 99233 SBSQ HOSP IP/OBS HIGH 50: CPT | Performed by: FAMILY MEDICINE

## 2020-09-12 PROCEDURE — 83605 ASSAY OF LACTIC ACID: CPT | Performed by: FAMILY MEDICINE

## 2020-09-12 PROCEDURE — A9270 NON-COVERED ITEM OR SERVICE: HCPCS | Mod: GY | Performed by: FAMILY MEDICINE

## 2020-09-12 PROCEDURE — 84132 ASSAY OF SERUM POTASSIUM: CPT | Performed by: FAMILY MEDICINE

## 2020-09-12 PROCEDURE — 97110 THERAPEUTIC EXERCISES: CPT | Mod: GP

## 2020-09-12 PROCEDURE — 12000000 ZZH R&B MED SURG/OB

## 2020-09-12 PROCEDURE — 25000132 ZZH RX MED GY IP 250 OP 250 PS 637: Mod: GY | Performed by: FAMILY MEDICINE

## 2020-09-12 PROCEDURE — 85027 COMPLETE CBC AUTOMATED: CPT | Performed by: FAMILY MEDICINE

## 2020-09-12 PROCEDURE — 80048 BASIC METABOLIC PNL TOTAL CA: CPT | Performed by: FAMILY MEDICINE

## 2020-09-12 RX ORDER — IBUPROFEN 400 MG/1
400 TABLET, FILM COATED ORAL EVERY 6 HOURS PRN
Status: DISCONTINUED | OUTPATIENT
Start: 2020-09-12 | End: 2020-09-14 | Stop reason: HOSPADM

## 2020-09-12 RX ORDER — POTASSIUM CHLORIDE 29.8 MG/ML
20 INJECTION INTRAVENOUS
Status: DISCONTINUED | OUTPATIENT
Start: 2020-09-12 | End: 2020-09-12 | Stop reason: RX

## 2020-09-12 RX ORDER — POTASSIUM CL/LIDO/0.9 % NACL 10MEQ/0.1L
10 INTRAVENOUS SOLUTION, PIGGYBACK (ML) INTRAVENOUS
Status: DISCONTINUED | OUTPATIENT
Start: 2020-09-12 | End: 2020-09-14 | Stop reason: HOSPADM

## 2020-09-12 RX ORDER — POTASSIUM CHLORIDE 7.45 MG/ML
10 INJECTION INTRAVENOUS
Status: DISCONTINUED | OUTPATIENT
Start: 2020-09-12 | End: 2020-09-14 | Stop reason: HOSPADM

## 2020-09-12 RX ORDER — POTASSIUM CHLORIDE 1500 MG/1
20-40 TABLET, EXTENDED RELEASE ORAL
Status: DISCONTINUED | OUTPATIENT
Start: 2020-09-12 | End: 2020-09-14 | Stop reason: HOSPADM

## 2020-09-12 RX ORDER — POTASSIUM CHLORIDE 1.5 G/1.58G
20-40 POWDER, FOR SOLUTION ORAL
Status: DISCONTINUED | OUTPATIENT
Start: 2020-09-12 | End: 2020-09-14 | Stop reason: HOSPADM

## 2020-09-12 RX ADMIN — ACETAMINOPHEN 650 MG: 325 TABLET, FILM COATED ORAL at 13:49

## 2020-09-12 RX ADMIN — CEFTRIAXONE SODIUM 2 G: 2 INJECTION, SOLUTION INTRAVENOUS at 12:08

## 2020-09-12 RX ADMIN — POTASSIUM CHLORIDE 40 MEQ: 20 TABLET, EXTENDED RELEASE ORAL at 12:08

## 2020-09-12 RX ADMIN — ALBUTEROL SULFATE 2 PUFF: 90 AEROSOL, METERED RESPIRATORY (INHALATION) at 10:20

## 2020-09-12 RX ADMIN — SERTRALINE HYDROCHLORIDE 150 MG: 100 TABLET ORAL at 08:36

## 2020-09-12 RX ADMIN — POTASSIUM CHLORIDE 20 MEQ: 20 TABLET, EXTENDED RELEASE ORAL at 13:49

## 2020-09-12 RX ADMIN — PANTOPRAZOLE SODIUM 40 MG: 20 TABLET, DELAYED RELEASE ORAL at 08:36

## 2020-09-12 RX ADMIN — ACETAMINOPHEN 650 MG: 325 TABLET, FILM COATED ORAL at 23:08

## 2020-09-12 RX ADMIN — ACETAMINOPHEN 650 MG: 325 TABLET, FILM COATED ORAL at 22:48

## 2020-09-12 ASSESSMENT — ACTIVITIES OF DAILY LIVING (ADL)
ADLS_ACUITY_SCORE: 17

## 2020-09-12 ASSESSMENT — MIFFLIN-ST. JEOR: SCORE: 1211.38

## 2020-09-12 NOTE — PROGRESS NOTES
[facility]      Subjective:  She feels better.  She had fever and chills last evening.  Just vague abdominal pain.  She is eating.    Review of Systems:  CONSTITUTIONAL: Fever and chills last evening  INTEGUMENTARY/SKIN: NEGATIVE for worrisome rashes, moles or lesions  EYES: NEGATIVE for vision changes or irritation  ENT/MOUTH: NEGATIVE for ear, mouth and throat problems  RESP: NEGATIVE for significant cough or SOB  BREAST: NEGATIVE for masses, tenderness or discharge  CV: NEGATIVE for chest pain, palpitations or peripheral edema  GI: NEGATIVE for nausea, abdominal pain, heartburn, or change in bowel habits  : NEGATIVE for frequency, dysuria, or hematuria  MUSCULOSKELETAL: NEGATIVE for significant arthralgias or myalgia  NEURO: NEGATIVE for weakness, dizziness or paresthesias  ENDOCRINE: NEGATIVE for temperature intolerance, skin/hair changes  HEME: NEGATIVE for bleeding problems  PSYCHIATRIC: NEGATIVE for changes in mood or affect    Physical Exam:  Vitals Were Reviewed    Patient Vitals for the past 16 hrs:   BP Temp Temp src Pulse Resp SpO2 Weight   09/12/20 0805 125/54 98.6  F (37  C) Oral 93 20 93 % --   09/12/20 0640 -- -- -- -- -- -- 76.4 kg (168 lb 6.9 oz)   09/12/20 0107 122/74 98.7  F (37.1  C) Oral 83 20 95 % --         Intake/Output Summary (Last 24 hours) at 9/12/2020 1104  Last data filed at 9/12/2020 0120  Gross per 24 hour   Intake 1010 ml   Output --   Net 1010 ml       GENERAL APPEARANCE: healthy, alert and no distress  EYES: conjunctiva clear, eyes grossly normal  RESP: lungs clear to auscultation - no rales, rhonchi or wheezes  CV: regular rate and rhythm, normal S1 S2, no S3 or S4 and no murmur, click or rub   ABDOMEN: soft, nontender, no HSM or masses and bowel sounds normal  MS: no clubbing, cyanosis; no edema  SKIN: clear without significant rashes or lesions    Lab:  Recent Labs   Lab Test 09/12/20  0514 09/11/20  0454    142   POTASSIUM 3.2* 3.5   CHLORIDE  109 112*   CO2 24 25   ANIONGAP 8 5   * 110*   BUN 9 11   CR 0.80 0.89   SURYA 7.8* 7.4*     CBC RESULTS:   Recent Labs   Lab Test 09/12/20  0514 09/11/20  0454   WBC 18.1* 20.6*   RBC 3.83 3.76*   HGB 11.5* 11.0*   HCT 33.3* 33.7*    276       Results for orders placed or performed during the hospital encounter of 09/09/20 (from the past 24 hour(s))   Lactic acid level STAT   Result Value Ref Range    Lactate for Sepsis Protocol 1.9 0.7 - 2.0 mmol/L   CBC with platelets   Result Value Ref Range    WBC 18.1 (H) 4.0 - 11.0 10e9/L    RBC Count 3.83 3.8 - 5.2 10e12/L    Hemoglobin 11.5 (L) 11.7 - 15.7 g/dL    Hematocrit 33.3 (L) 35.0 - 47.0 %    MCV 87 78 - 100 fl    MCH 30.0 26.5 - 33.0 pg    MCHC 34.5 31.5 - 36.5 g/dL    RDW 14.7 10.0 - 15.0 %    Platelet Count 320 150 - 450 10e9/L   Basic metabolic panel   Result Value Ref Range    Sodium 141 133 - 144 mmol/L    Potassium 3.2 (L) 3.4 - 5.3 mmol/L    Chloride 109 94 - 109 mmol/L    Carbon Dioxide 24 20 - 32 mmol/L    Anion Gap 8 3 - 14 mmol/L    Glucose 111 (H) 70 - 99 mg/dL    Urea Nitrogen 9 7 - 30 mg/dL    Creatinine 0.80 0.52 - 1.04 mg/dL    GFR Estimate 73 >60 mL/min/[1.73_m2]    GFR Estimate If Black 85 >60 mL/min/[1.73_m2]    Calcium 7.8 (L) 8.5 - 10.1 mg/dL       Assessment and Plan:    Sofia Shay is a 70 year old female who presents on 9/9/2020 with chills fever, fatigue, weakness.         Pyelonephritis/septic shock/E. coli bacteremia   Patient was seen on 9-2-2020 by primary care provider for concerns of dysuria, increased frequency, hematuria, chills and fever.  At that time she was started on Bactrim DS, she reports taking all but 1 dose. She began having hallucinations on Sunday and thought that it might be an adverse reaction to medication so she stopped. She presented to the ED yesterday evening due to increasing chills and fever. She reports generalized fatigue and malaise.  However she denies significant urinary complaints at  this time, and notes maybe she is not urinating as much as she was before. Urinalysis significant for positive nitrites, moderate blood, large leukocyte esterase, 147 WBC, 21 RBC, few bacteria, few yeast. Was started on maxipime and vancomycin in ED and given a liter of fluids.      Blood culture with E. coli sensitive to Rocephin  Urine culture with 2 species of E. coli sensitive to Rocephin  T-max 101.4 at 1629 yesterday  WBC 30 5K--20.6--18.1  Patient hypotensive 9/10/20 received fluid boluses--BP now 125/54  Continue Rocephin      COVID-19-PCR negative  Precautions removed.     Mild major depression   NICOLE (generalized anxiety disorder)  Patient managed prior to admission with Klonopin 0.5 mg twice daily as needed for anxiety, Zoloft 150 mg daily.   - Continue home medication.      RA (rheumatoid arthritis)   Patient managed prior to admission with Simponi IV treatment every 8 weeks, methotrexate 7.5 mg every 7 days, wellcovorin 5 mg once per week after methotrexate.  She does follow with a rheumatologist.   -Hold medications while in the hospital.   - Continue outpatient follow-up and management.      COPD (chronic obstructive pulmonary disease)-with some exacerbation   Patient managed prior to admission with albuterol as needed.   - Continue home medication.  Suspect sepsis as cause some exacerbation.  We will not use prednisone at this time.     Tobacco abuse  Patient is a current daily smoker, up to half pack per day.   - She denies wanting a nicotine patch at this time.      Hyperlipidemia LDL goal <130  Noted on problem list.  Not currently on medication.     Gastroesophageal reflux disease without esophagitis  Patient managed prior to admission with Protonix 40 mg daily.   - Continue home medication.     Diet: Low Saturated Fat Na <2400 mg    DVT Prophylaxis: Pneumatic Compression Devices  Muse Catheter: not present  Code Status: Full code         Disposition Plan  IV fluids were stopped yesterday.   Creatinine normal.  Potassium low-start protocol.  1-2 more days.

## 2020-09-12 NOTE — PLAN OF CARE
At 1530 patient was not feeling well - was shaking/chills, fever of 101.3 oral and had increased respiratory and heart rate.  PRN tylenol 650 mg PO given, MD paged as septic protocol was flagged - see note. Patient monitored closely.  VS improved, fever decreased throughout shift and patient is feeling much better.  No chills, temperature down to 99, normal respiratory and heart rate.  Another dose of  mg PO tylenol was given for mild headache.      Patient able to eat and drink fluids well during shift.  IV patent, saline locked.  Patient up to bathroom independently.  Patient resting well at end of shift.    Yanet Geronimo RN 10:56 PM 9/11/2020

## 2020-09-12 NOTE — PLAN OF CARE
Pt has been resting quiet without any c/o discomfort.  Pt up independently in room and calls if she wants or needs anything.  Pt has inspiratory deep wheeze throughout except LLL.  MAYCOL Fishman RN

## 2020-09-12 NOTE — PLAN OF CARE
"Patient alert/oriented  saline locked.  Low-fat diet.  Independent. Patient c/o rigors but now resolved. Temp 99.9.  Has a HA, but otherwise denies pain.  Patient wanted to try some coffee for a possible caffeine HA. Declined Tylenol. Patient states she is voiding just fine. Potassium recheck = 3.8.    /60 (BP Location: Left arm)   Pulse 92   Temp 99.9  F (37.7  C) (Oral)   Resp 20   Ht 1.549 m (5' 1\")   Wt 76.4 kg (168 lb 6.9 oz)   SpO2 95%   BMI 31.82 kg/m      Zulma Stein RN on 9/12/2020 at 9:18 PM      "

## 2020-09-12 NOTE — PLAN OF CARE
Physical Therapy Discharge Summary    Reason for therapy discharge:    Patient/family request discontinuation of services.    Progress towards therapy goal(s). See goals on Care Plan in [facility] electronic health record for goal details.  Goals partially met.  Barriers to achieving goals:   Pt declined amb today. She doesn't feel she needs continued PT. Therefore, stairs were not assessed.    Therapy recommendation(s):  Recommend walking in halls with nsg 3-4x/day. Pt in agreement.  Continue home exercise program.    Christina Hernandez PT

## 2020-09-13 LAB
ANION GAP SERPL CALCULATED.3IONS-SCNC: 4 MMOL/L (ref 3–14)
BUN SERPL-MCNC: 10 MG/DL (ref 7–30)
CALCIUM SERPL-MCNC: 7.9 MG/DL (ref 8.5–10.1)
CHLORIDE SERPL-SCNC: 108 MMOL/L (ref 94–109)
CO2 SERPL-SCNC: 28 MMOL/L (ref 20–32)
CREAT SERPL-MCNC: 0.96 MG/DL (ref 0.52–1.04)
ERYTHROCYTE [DISTWIDTH] IN BLOOD BY AUTOMATED COUNT: 14.8 % (ref 10–15)
GFR SERPL CREATININE-BSD FRML MDRD: 59 ML/MIN/{1.73_M2}
GLUCOSE SERPL-MCNC: 109 MG/DL (ref 70–99)
HCT VFR BLD AUTO: 32.8 % (ref 35–47)
HGB BLD-MCNC: 11 G/DL (ref 11.7–15.7)
MCH RBC QN AUTO: 29.5 PG (ref 26.5–33)
MCHC RBC AUTO-ENTMCNC: 33.5 G/DL (ref 31.5–36.5)
MCV RBC AUTO: 88 FL (ref 78–100)
PLATELET # BLD AUTO: 344 10E9/L (ref 150–450)
POTASSIUM SERPL-SCNC: 3.5 MMOL/L (ref 3.4–5.3)
RBC # BLD AUTO: 3.73 10E12/L (ref 3.8–5.2)
SODIUM SERPL-SCNC: 140 MMOL/L (ref 133–144)
WBC # BLD AUTO: 15.3 10E9/L (ref 4–11)

## 2020-09-13 PROCEDURE — 25000132 ZZH RX MED GY IP 250 OP 250 PS 637: Mod: GY | Performed by: FAMILY MEDICINE

## 2020-09-13 PROCEDURE — 25000132 ZZH RX MED GY IP 250 OP 250 PS 637: Mod: GY | Performed by: EMERGENCY MEDICINE

## 2020-09-13 PROCEDURE — 25000128 H RX IP 250 OP 636: Performed by: FAMILY MEDICINE

## 2020-09-13 PROCEDURE — 85027 COMPLETE CBC AUTOMATED: CPT | Performed by: FAMILY MEDICINE

## 2020-09-13 PROCEDURE — 12000000 ZZH R&B MED SURG/OB

## 2020-09-13 PROCEDURE — 36415 COLL VENOUS BLD VENIPUNCTURE: CPT | Performed by: FAMILY MEDICINE

## 2020-09-13 PROCEDURE — 99233 SBSQ HOSP IP/OBS HIGH 50: CPT | Performed by: FAMILY MEDICINE

## 2020-09-13 PROCEDURE — 25000132 ZZH RX MED GY IP 250 OP 250 PS 637: Mod: GY | Performed by: PHYSICIAN ASSISTANT

## 2020-09-13 PROCEDURE — 80048 BASIC METABOLIC PNL TOTAL CA: CPT | Performed by: FAMILY MEDICINE

## 2020-09-13 RX ADMIN — ACETAMINOPHEN 650 MG: 325 TABLET, FILM COATED ORAL at 17:43

## 2020-09-13 RX ADMIN — SENNOSIDES AND DOCUSATE SODIUM 2 TABLET: 8.6; 5 TABLET ORAL at 01:54

## 2020-09-13 RX ADMIN — PANTOPRAZOLE SODIUM 40 MG: 20 TABLET, DELAYED RELEASE ORAL at 08:27

## 2020-09-13 RX ADMIN — ACETAMINOPHEN 650 MG: 325 TABLET, FILM COATED ORAL at 12:34

## 2020-09-13 RX ADMIN — IBUPROFEN 400 MG: 400 TABLET ORAL at 23:04

## 2020-09-13 RX ADMIN — SERTRALINE HYDROCHLORIDE 150 MG: 100 TABLET ORAL at 08:27

## 2020-09-13 RX ADMIN — CEFTRIAXONE SODIUM 2 G: 2 INJECTION, SOLUTION INTRAVENOUS at 12:24

## 2020-09-13 ASSESSMENT — ACTIVITIES OF DAILY LIVING (ADL)
ADLS_ACUITY_SCORE: 17

## 2020-09-13 ASSESSMENT — MIFFLIN-ST. JEOR: SCORE: 1209.38

## 2020-09-13 NOTE — PLAN OF CARE
"Patient triggered sepsis alert. Standing orders placed for LA and repeat vitals.  Nettie CAIN, temp 101.9.  Patient c/o chills.  Given Tylenol.    /62 (BP Location: Right arm)   Pulse 95   Temp 101.9  F (38.8  C) (Oral)   Resp 20   Ht 1.549 m (5' 1\")   Wt 76.4 kg (168 lb 6.9 oz)   SpO2 95%   BMI 31.82 kg/m        "

## 2020-09-13 NOTE — PLAN OF CARE
Received call from Dr. LEDA Reed to give additional 650 mg of Tylenol.  Also will add ibuprofen prn order.  Encouraged patient to push fluids.  Zulma Stein RN on 9/12/2020 at 11:13 PM

## 2020-09-13 NOTE — PROGRESS NOTES
[facility] Hospitalist Service      Subjective:  She is better than when she came in.  But she still feels punk.  She had significant fever last night.  She has some very mild vague abdominal pain.  She has had chronic incontinence and this persists.    Review of Systems:  CONSTITUTIONAL: Above  INTEGUMENTARY/SKIN: NEGATIVE for worrisome rashes, moles or lesions  EYES: NEGATIVE for vision changes or irritation  ENT/MOUTH: NEGATIVE for ear, mouth and throat problems  RESP: NEGATIVE for significant cough or SOB  BREAST: NEGATIVE for masses, tenderness or discharge  CV: NEGATIVE for chest pain, palpitations or peripheral edema  GI: Above  : NEGATIVE for frequency, dysuria, or hematuria   female: Above  MUSCULOSKELETAL: NEGATIVE for significant arthralgias or myalgia  NEURO: NEGATIVE for weakness, dizziness or paresthesias  ENDOCRINE: NEGATIVE for temperature intolerance, skin/hair changes  HEME: NEGATIVE for bleeding problems  PSYCHIATRIC: NEGATIVE for changes in mood or affect    Physical Exam:  Vitals Were Reviewed    Patient Vitals for the past 16 hrs:   BP Temp Temp src Pulse Resp SpO2 Weight   09/13/20 0759 130/72 98.9  F (37.2  C) Oral 77 20 95 % --   09/13/20 0551 -- -- -- -- -- -- 76.2 kg (167 lb 15.9 oz)   09/13/20 0548 136/80 98.7  F (37.1  C) Oral 74 20 95 % --   09/13/20 0200 -- 98.5  F (36.9  C) -- -- -- -- --   09/12/20 2240 106/62 101.9  F (38.8  C) Oral 95 20 95 % --   09/12/20 2211 -- 98.9  F (37.2  C) Oral -- -- -- --         Intake/Output Summary (Last 24 hours) at 9/13/2020 1200  Last data filed at 9/13/2020 0900  Gross per 24 hour   Intake 600 ml   Output --   Net 600 ml       GENERAL APPEARANCE: healthy, alert and no distress  EYES: conjunctiva clear, eyes grossly normal  RESP: lungs clear to auscultation - no rales, rhonchi or wheezes  CV: regular rate and rhythm, normal S1 S2, no S3 or S4 and no murmur, click or rub   ABDOMEN: soft, nontender, no HSM or masses and bowel sounds  normal  MS: no clubbing, cyanosis; no edema  SKIN: clear without significant rashes or lesions  NEURO: Normal strength and tone, sensory exam grossly normal, mentation intact and speech normal    Lab:  Recent Labs   Lab Test 09/13/20  0520 09/12/20  1726 09/12/20  0514     --  141   POTASSIUM 3.5 3.8 3.2*   CHLORIDE 108  --  109   CO2 28  --  24   ANIONGAP 4  --  8   *  --  111*   BUN 10  --  9   CR 0.96  --  0.80   SURYA 7.9*  --  7.8*     CBC RESULTS:   Recent Labs   Lab Test 09/13/20  0520 09/12/20  0514   WBC 15.3* 18.1*   RBC 3.73* 3.83   HGB 11.0* 11.5*   HCT 32.8* 33.3*    320       Results for orders placed or performed during the hospital encounter of 09/09/20 (from the past 24 hour(s))   Potassium   Result Value Ref Range    Potassium 3.8 3.4 - 5.3 mmol/L   Lactic acid level STAT   Result Value Ref Range    Lactate for Sepsis Protocol 1.1 0.7 - 2.0 mmol/L   CBC with platelets   Result Value Ref Range    WBC 15.3 (H) 4.0 - 11.0 10e9/L    RBC Count 3.73 (L) 3.8 - 5.2 10e12/L    Hemoglobin 11.0 (L) 11.7 - 15.7 g/dL    Hematocrit 32.8 (L) 35.0 - 47.0 %    MCV 88 78 - 100 fl    MCH 29.5 26.5 - 33.0 pg    MCHC 33.5 31.5 - 36.5 g/dL    RDW 14.8 10.0 - 15.0 %    Platelet Count 344 150 - 450 10e9/L   Basic metabolic panel   Result Value Ref Range    Sodium 140 133 - 144 mmol/L    Potassium 3.5 3.4 - 5.3 mmol/L    Chloride 108 94 - 109 mmol/L    Carbon Dioxide 28 20 - 32 mmol/L    Anion Gap 4 3 - 14 mmol/L    Glucose 109 (H) 70 - 99 mg/dL    Urea Nitrogen 10 7 - 30 mg/dL    Creatinine 0.96 0.52 - 1.04 mg/dL    GFR Estimate 59 (L) >60 mL/min/[1.73_m2]    GFR Estimate If Black 69 >60 mL/min/[1.73_m2]    Calcium 7.9 (L) 8.5 - 10.1 mg/dL       Assessment and Plan:    Sofia Shay is a 70 year old female who presents on 9/9/2020 with chills fever, fatigue, weakness.         Pyelonephritis/septic shock/E. coli bacteremia   Patient was seen on 9-2-2020 by primary care provider for concerns of  dysuria, increased frequency, hematuria, chills and fever.  At that time she was started on Bactrim DS, she reports taking all but 1 dose. She began having hallucinations on Sunday and thought that it might be an adverse reaction to medication so she stopped. She presented to the ED yesterday evening due to increasing chills and fever. She reports generalized fatigue and malaise.  However she denies significant urinary complaints at this time, and notes maybe she is not urinating as much as she was before. Urinalysis significant for positive nitrites, moderate blood, large leukocyte esterase, 147 WBC, 21 RBC, few bacteria, few yeast. Was started on maxipime and vancomycin in ED and given a liter of fluids.      Blood culture with E. coli sensitive to Rocephin  Urine culture with 2 species of E. coli sensitive to Rocephin  T-max 101.9 at 2240  yesterday  WBC 30 5K--20.6--18.1--15.3  Patient hypotensive 9/10/20 received fluid boluses--BP now normalized  Continue Rocephin      COVID-19-PCR negative  Precautions removed.     Mild major depression   NICOLE (generalized anxiety disorder)  Patient managed prior to admission with Klonopin 0.5 mg twice daily as needed for anxiety, Zoloft 150 mg daily.   - Continue home medication.      RA (rheumatoid arthritis)   Patient managed prior to admission with Simponi IV treatment every 8 weeks, methotrexate 7.5 mg every 7 days, wellcovorin 5 mg once per week after methotrexate.  She does follow with a rheumatologist.   -Hold medications while in the hospital.   - Continue outpatient follow-up and management.      COPD (chronic obstructive pulmonary disease)-with some exacerbation   Patient managed prior to admission with albuterol as needed.   - Continue home medication.  Suspect sepsis as cause some exacerbation.  We will not use prednisone at this time.     Tobacco abuse  Patient is a current daily smoker, up to half pack per day.   - She denies wanting a nicotine patch at this  time.      Hyperlipidemia LDL goal <130  Noted on problem list.  Not currently on medication.     Gastroesophageal reflux disease without esophagitis  Patient managed prior to admission with Protonix 40 mg daily.   - Continue home medication.     Diet: Low Saturated Fat Na <2400 mg    DVT Prophylaxis: Pneumatic Compression Devices  Muse Catheter: not present  Code Status: Full code         Disposition Plan  Patient with pyelonephritis and E. coli bacteremia/sepsis.  She is clinically improved and white count is improved but still spikes temps at night.  Continue Rocephin.  Likely home 1 to 2 days.

## 2020-09-13 NOTE — PLAN OF CARE
Temperature 100.5 this afternoon. Patient was given tylenol 650 mg just before temperature increase for c/o all over body aches. She is independent in room. Continues to receive rocephin daily for pyelo/sepsis.

## 2020-09-13 NOTE — PLAN OF CARE
"Pt has been very withdrawn tonight because she \"Just don't feel good\".  Pt temp max 100.5.  Pt goes from hot to cool and clammy.  Pt awakens easily and remains independent but, physically,  she looks and acts more sickly today.  MAYCOL Fishman RN  "

## 2020-09-14 VITALS
RESPIRATION RATE: 18 BRPM | WEIGHT: 164.68 LBS | HEART RATE: 87 BPM | DIASTOLIC BLOOD PRESSURE: 65 MMHG | BODY MASS INDEX: 31.09 KG/M2 | HEIGHT: 61 IN | OXYGEN SATURATION: 94 % | TEMPERATURE: 98.5 F | SYSTOLIC BLOOD PRESSURE: 123 MMHG

## 2020-09-14 LAB
ANION GAP SERPL CALCULATED.3IONS-SCNC: 6 MMOL/L (ref 3–14)
BUN SERPL-MCNC: 11 MG/DL (ref 7–30)
CALCIUM SERPL-MCNC: 8.2 MG/DL (ref 8.5–10.1)
CHLORIDE SERPL-SCNC: 109 MMOL/L (ref 94–109)
CO2 SERPL-SCNC: 28 MMOL/L (ref 20–32)
CREAT SERPL-MCNC: 0.65 MG/DL (ref 0.52–1.04)
ERYTHROCYTE [DISTWIDTH] IN BLOOD BY AUTOMATED COUNT: 14.6 % (ref 10–15)
GFR SERPL CREATININE-BSD FRML MDRD: 88 ML/MIN/{1.73_M2}
GLUCOSE SERPL-MCNC: 107 MG/DL (ref 70–99)
HCT VFR BLD AUTO: 32.5 % (ref 35–47)
HGB BLD-MCNC: 10.7 G/DL (ref 11.7–15.7)
MCH RBC QN AUTO: 28.7 PG (ref 26.5–33)
MCHC RBC AUTO-ENTMCNC: 32.9 G/DL (ref 31.5–36.5)
MCV RBC AUTO: 87 FL (ref 78–100)
PLATELET # BLD AUTO: 324 10E9/L (ref 150–450)
POTASSIUM SERPL-SCNC: 3.5 MMOL/L (ref 3.4–5.3)
RBC # BLD AUTO: 3.73 10E12/L (ref 3.8–5.2)
SODIUM SERPL-SCNC: 143 MMOL/L (ref 133–144)
WBC # BLD AUTO: 14.2 10E9/L (ref 4–11)

## 2020-09-14 PROCEDURE — 99239 HOSP IP/OBS DSCHRG MGMT >30: CPT | Performed by: FAMILY MEDICINE

## 2020-09-14 PROCEDURE — 80048 BASIC METABOLIC PNL TOTAL CA: CPT | Performed by: FAMILY MEDICINE

## 2020-09-14 PROCEDURE — 25000132 ZZH RX MED GY IP 250 OP 250 PS 637: Mod: GY | Performed by: PHYSICIAN ASSISTANT

## 2020-09-14 PROCEDURE — 85027 COMPLETE CBC AUTOMATED: CPT | Performed by: FAMILY MEDICINE

## 2020-09-14 PROCEDURE — 25000128 H RX IP 250 OP 636: Performed by: FAMILY MEDICINE

## 2020-09-14 PROCEDURE — 36415 COLL VENOUS BLD VENIPUNCTURE: CPT | Performed by: FAMILY MEDICINE

## 2020-09-14 RX ORDER — CIPROFLOXACIN 500 MG/1
500 TABLET, FILM COATED ORAL 2 TIMES DAILY
Qty: 16 TABLET | Refills: 0 | Status: SHIPPED | OUTPATIENT
Start: 2020-09-14 | End: 2020-09-22

## 2020-09-14 RX ORDER — CIPROFLOXACIN 500 MG/1
500 TABLET, FILM COATED ORAL 2 TIMES DAILY
DISCHARGE
Start: 2020-09-14 | End: 2020-09-14

## 2020-09-14 RX ADMIN — SERTRALINE HYDROCHLORIDE 150 MG: 100 TABLET ORAL at 08:50

## 2020-09-14 RX ADMIN — CEFTRIAXONE SODIUM 2 G: 2 INJECTION, SOLUTION INTRAVENOUS at 12:11

## 2020-09-14 RX ADMIN — PANTOPRAZOLE SODIUM 40 MG: 20 TABLET, DELAYED RELEASE ORAL at 08:50

## 2020-09-14 ASSESSMENT — ACTIVITIES OF DAILY LIVING (ADL)
ADLS_ACUITY_SCORE: 17

## 2020-09-14 ASSESSMENT — MIFFLIN-ST. JEOR: SCORE: 1194.38

## 2020-09-14 NOTE — PLAN OF CARE
Pt has been able to sleep more tonight.  Pt has not c/o pain or discomfort.  Lung sounds this AM were clear.  Pt up independently in room and able to let needs be known.  MAYCOL Fishman RN

## 2020-09-14 NOTE — PLAN OF CARE
Patient alert and orientated. Vital signs stable. On room air. Afebrile. Up independently.     Patient denied any pain. Denied any lightheadedness/dizziness, shortness of breath, difficulty breathing, nausea, or numbness/tingling. Patient reported a poor appetite, but was able to eat some of her breakfast and lunch. Scheduled IV antibiotic given. Patient encouraged to move around and go for a walk in the hallway.       Potassium level 3.5; no replacement needed per protocol.     Patient discharging home today; patient's daughter is picking her up.

## 2020-09-14 NOTE — DISCHARGE SUMMARY
[facility] Discharge Summary    Sofia Shay MRN# [id number]   Age: 70 year old YOB: 1950     Date of Admission:  9/9/2020  Date of Discharge::  9/14/2020  Admitting Physician:  Jonas Centeno MD  Discharge Physician:  Jacinto Maldonado MD, MD             Admission Diagnoses:   Dehydration [E86.0]  Pyelonephritis, acute [N10]  Acute kidney insufficiency [N28.9]  Bandemia [D72.825]  SIRS (systemic inflammatory response syndrome) (H) [R65.10]          Principle Discharge Diagnosis:       Pyelonephritis with septic shock and E. coli bacteremia (bloodstream infection) due to this      See hospital course for further active diagnoses addressed during this admission.            Procedures:   No procedures performed during this admission          Medications Prior to Admission:     Medications Prior to Admission   Medication Sig Dispense Refill Last Dose     albuterol (PROAIR HFA/PROVENTIL HFA/VENTOLIN HFA) 108 (90 Base) MCG/ACT inhaler Inhale 2 puffs into the lungs every 4 hours as needed for shortness of breath / dyspnea or wheezing 1 Inhaler 5 Past Week at Unknown time     clonazePAM (KLONOPIN) 0.5 MG tablet Take 1 tablet by mouth twice daily as needed for anxiety 30 tablet 0 Past Month at Unknown time     Golimumab (SIMPONI ARIA IV) IV Treatment every 8 weeks   8/18/2020 at Clinic     leucovorin (WELLCOVORIN) 5 MG tablet Take 5 mg by mouth once a week Once per week, after methotrexate   Past Month at Unknown time     METHOTREXATE 2.5 MG OR TABS Take 7.5 mg by mouth every 7 days ( 3 Tablets x 2.5 mg) Once weekly   Past Month at Unknown time     OVER-THE-COUNTER Place 1 drop into both eyes 2 times daily. Soothe XP artificial tears 1 Bottle  9/9/2020 at Unknown time     pantoprazole (PROTONIX) 40 MG EC tablet Take 1 tablet (40 mg) by mouth daily 90 tablet 3 Past Week at Unknown time     sertraline (ZOLOFT) 100 MG tablet TAKE ONE & ONE-HALF TABLETS BY MOUTH ONCE DAILY (Patient taking  differently: Take 150 mg by mouth daily (1.5 tablet x 100 mg)) 135 tablet 3 Past Week at Unknown time     [DISCONTINUED] Calcium Carbonate-Vitamin D (CALCIUM + D) 600-200 MG-UNIT per tablet Take 1 tablet by mouth daily.   Past Week at Unknown time             Discharge Medications:     Current Discharge Medication List      START taking these medications    Details   ciprofloxacin (CIPRO) 500 MG tablet Take 1 tablet (500 mg) by mouth 2 times daily for 8 days    Associated Diagnoses: Pyelonephritis, acute         CONTINUE these medications which have CHANGED    Details   calcium carbonate-vitamin D (CALCIUM + D) 600-200 MG-UNIT TABS Take 1 tablet by mouth daily Hold off on taking this until you're done with your ciprofloxacin (antibiotic), then resume it as usual.  Qty:           CONTINUE these medications which have NOT CHANGED    Details   albuterol (PROAIR HFA/PROVENTIL HFA/VENTOLIN HFA) 108 (90 Base) MCG/ACT inhaler Inhale 2 puffs into the lungs every 4 hours as needed for shortness of breath / dyspnea or wheezing  Qty: 1 Inhaler, Refills: 5    Comments: Pharmacy may dispense brand covered by insurance (Proair, or proventil or ventolin or generic albuterol inhaler)  Associated Diagnoses: Chronic obstructive pulmonary disease, unspecified COPD type (H)      clonazePAM (KLONOPIN) 0.5 MG tablet Take 1 tablet by mouth twice daily as needed for anxiety  Qty: 30 tablet, Refills: 0    Associated Diagnoses: Anxiety      Golimumab (SIMPONI ARIA IV) IV Treatment every 8 weeks      leucovorin (WELLCOVORIN) 5 MG tablet Take 5 mg by mouth once a week Once per week, after methotrexate      METHOTREXATE 2.5 MG OR TABS Take 7.5 mg by mouth every 7 days ( 3 Tablets x 2.5 mg) Once weekly      OVER-THE-COUNTER Place 1 drop into both eyes 2 times daily. Soothe XP artificial tears  Qty: 1 Bottle    Associated Diagnoses: Dry eye syndrome      pantoprazole (PROTONIX) 40 MG EC tablet Take 1 tablet (40 mg) by mouth daily  Qty: 90 tablet,  "Refills: 3    Associated Diagnoses: Gastroesophageal reflux disease without esophagitis      sertraline (ZOLOFT) 100 MG tablet TAKE ONE & ONE-HALF TABLETS BY MOUTH ONCE DAILY  Qty: 135 tablet, Refills: 3    Associated Diagnoses: Anxiety                     Brief History of Illness:     From Admission H+P:   Sofia Shay is a 70 year old female with past medical history of COPD, anxiety, depression, rheumatoid arthritis, GERD, COPD who now presents on 9/9/2020 with fever and chills, confusion, dysuria and fatigue.      Patient was seen on 9-2-2020 by primary care provider for concerns of dysuria, increased frequency, hematuria, chills and fever.  At that time she was started on Bactrim DS and instructed to take 1 tablet twice daily for 5 days.  She reports that she initially started to take this medication but then on Sunday she had a hallucination and she felt very confused.  She reports that she took all but 1 dose of Bactrim.      Patient presented to the emergency room yesterday evening with complaints of generalized fatigue, chills, and fevers.  She reports she has been sleeping more than usual.  She admits to generalized weakness.  She currently denies hematuria, dysuria, increased frequency.  She actually notes that she has not been urinating as much as she usually does.  She denies abdominal pain, flank pain, back pain.  She has no nausea or vomiting.  She denies chest pain or shortness of breath.     She reports that she has had a poor appetite, was able to tolerate some lunch.     Patient is a current daily smoker, up to half pack per day.     She lives at home with her aunt.     No known sick contacts.                  TODAY:     Subjective:  Feels much improved.  Still chilled a bit at times but \"not nearly as bad\" yesterday or today and no true fever since Sat.  No pain.  Strength and energy improving.  Breathing doing well.  Hasn't moved much, but thinks she would do better with this at home.  No " "other change.s    No other pain.     ROS:   ROS: 10 point ROS neg other than the symptoms noted above in the HPI.   BP (!) 141/64 (BP Location: Right arm)   Pulse 79   Temp 98.5  F (36.9  C) (Oral)   Resp 18   Ht 1.549 m (5' 1\")   Wt 74.7 kg (164 lb 10.9 oz)   SpO2 94%   BMI 31.12 kg/m     EXAM:  General: awake and alert, NAD, oriented x 3  Head: normocephalic  Neck: unremarkable, no lymphadenopathy   HEENT: oropharynx pink and moist    Heart: Regular rate and rhythm, no murmurs, rubs, or gallops  Lungs: clear to auscultation bilaterally with good air movement throughout  Abdomen: soft, non-tender, no masses or organomegaly  Extremities: no edema in lower extremities   Skin unremarkable.            Hospital Course:       Sofia Shay is a 70 year old female who presents on 9/9/2020 with chills fever, fatigue, weakness.         Pyelonephritis with septic shock and E. coli bacteremia (bloodstream infection) due to this    Patient was seen on 9-2-2020 by primary care provider for concerns of dysuria, increased frequency, hematuria, chills and fever.  At that time she was started on Bactrim DS, she reports taking all but 1 dose. She began having hallucinations on Sunday and thought that it might be an adverse reaction to medication so she stopped. She presented to the ED yesterday evening due to increasing chills and fever. She reports generalized fatigue and malaise.  However she denies significant urinary complaints at this time, and notes maybe she is not urinating as much as she was before. Urinalysis positive. Was started on maxipime and vancomycin in ED and given a liter of fluids.   Blood culture with E. coli sensitive to Rocephin and cipro   Urine culture with 2 species of E. coli sensitive to Rocephin  T-max 101.9 at 2240  yesterday  WBC 30 5K--20.6--18.1--15.3  Patient hypotensive 9/10/20 received fluid boluses--BP now normalized  Did well thereafter with WBC normalizing and no true fever > 24h " at time of discharge.  Will discharge on oral cipro x 8 more days.  Follow-up with primary care provider in 1 week.        COVID-19-PCR negative  Precautions removed.     Mild major depression   NICOLE (generalized anxiety disorder)  Patient managed prior to admission with Klonopin 0.5 mg twice daily as needed for anxiety, Zoloft 150 mg daily.   - Continue home medication.      RA (rheumatoid arthritis)   Patient managed prior to admission with Simponi IV treatment every 8 weeks, methotrexate 7.5 mg every 7 days, wellcovorin 5 mg once per week after methotrexate.  She does follow with a rheumatologist.   -Held medications while in the hospital.   - Continue prior to admission medications on discharge and continue outpatient follow-up and management.      COPD (chronic obstructive pulmonary disease)-with some exacerbation   Patient managed prior to admission with albuterol as needed.   - Continue home medication.  Suspect sepsis as cause some exacerbation.  We will not use prednisone at this time.  At baseline at time of discharge.       Tobacco abuse  Patient is a current daily smoker, up to half pack per day.   - She denies wanting a nicotine patch at this time.      Hyperlipidemia LDL goal <130  Noted on problem list.  Not currently on medication.     Gastroesophageal reflux disease without esophagitis  Patient managed prior to admission with Protonix 40 mg daily.   - Continue home medication.     Diet: Low Saturated Fat Na <2400 mg    DVT Prophylaxis: Pneumatic Compression Devices  Muse Catheter: not present  Code Status: Full code                                Discharge Instructions and Follow-Up:     Discharge diet: Orders Placed This Encounter      Low Saturated Fat Na <2400 mg       Discharge activity: Activity as tolerated   Discharge follow-up: Follow-up with your primary care provider in about 1 week.             Discharge Disposition:     Discharged to home with daughter.        Attestation:  I have  reviewed today's vital signs, notes, medications, labs and imaging.  Amount of time performed on this discharge summary: 50 minutes.    Jacinto Maldonado MD, MD

## 2020-09-14 NOTE — PROGRESS NOTES
[facility] DISCHARGE NOTE    Patient discharged to home at 4:52 PM via wheel chair. Accompanied by daughter and staff. Discharge instructions reviewed with patient, opportunity offered to ask questions. Prescriptions sent to patients preferred pharmacy. All belongings sent with patient.    Zulma Smith RN

## 2020-09-14 NOTE — PLAN OF CARE
"Patient alert/oriented.  Independent in room.  Patient c/o rigors around dinnertime, temp 99.6.  Tylenol given.  Temp stable.  Patient c/o \"pounding headache\".  Gave ibuprofen per patient's request.  /65 (BP Location: Right arm)   Pulse 82   Temp 99.3  F (37.4  C) (Oral)   Resp 18   Ht 1.549 m (5' 1\")   Wt 76.2 kg (167 lb 15.9 oz)   SpO2 95%   BMI 31.74 kg/m    Zulma Stein RN on 9/13/2020 at 11:08 PM    "

## 2020-09-14 NOTE — PROVIDER NOTIFICATION
Provider Notification     Message: [id number] MUNOZ - I can't find the prescription for the cipro. Was the prescription printed or electronically sent? Thanks!    Erica paged at [phone].    Orders: MD called back. Prescription sent.

## 2020-09-15 ENCOUNTER — TELEPHONE (OUTPATIENT)
Dept: FAMILY MEDICINE | Facility: CLINIC | Age: 70
End: 2020-09-15

## 2020-09-15 NOTE — TELEPHONE ENCOUNTER
ED/UC/IP follow up phone call:Pyelonephritis, Acute    RN please call to follow up. [place] 9/14/2020    Number of ED visits in past 12 months = 0    Tamra Wang on 9/15/2020 at 9:26 AM

## 2020-09-16 LAB
BACTERIA SPEC CULT: NO GROWTH
SPECIMEN SOURCE: NORMAL

## 2020-09-18 NOTE — TELEPHONE ENCOUNTER
ED / Discharge Outreach Protocol    Patient Contact    Attempt # 3    Was call answered?  No.  Left message on voicemail with information to call me back.  She has an appt 9-21-20.  Close this encounter then if no return call.    Elena MUNOZ RN BSN

## 2020-09-21 ENCOUNTER — OFFICE VISIT (OUTPATIENT)
Dept: FAMILY MEDICINE | Facility: CLINIC | Age: 70
End: 2020-09-21
Payer: MEDICARE

## 2020-09-21 VITALS
SYSTOLIC BLOOD PRESSURE: 100 MMHG | OXYGEN SATURATION: 97 % | WEIGHT: 151.9 LBS | TEMPERATURE: 98.4 F | HEART RATE: 80 BPM | BODY MASS INDEX: 28.68 KG/M2 | HEIGHT: 61 IN | DIASTOLIC BLOOD PRESSURE: 70 MMHG

## 2020-09-21 DIAGNOSIS — N39.41 URGE INCONTINENCE OF URINE: ICD-10-CM

## 2020-09-21 DIAGNOSIS — N12 PYELONEPHRITIS: Primary | ICD-10-CM

## 2020-09-21 DIAGNOSIS — B37.31 YEAST INFECTION OF THE VAGINA: ICD-10-CM

## 2020-09-21 DIAGNOSIS — R65.10 SIRS (SYSTEMIC INFLAMMATORY RESPONSE SYNDROME) (H): ICD-10-CM

## 2020-09-21 DIAGNOSIS — Z09 HOSPITAL DISCHARGE FOLLOW-UP: ICD-10-CM

## 2020-09-21 LAB
ALBUMIN UR-MCNC: NEGATIVE MG/DL
APPEARANCE UR: CLEAR
BACTERIA #/AREA URNS HPF: ABNORMAL /HPF
BILIRUB UR QL STRIP: NEGATIVE
COLOR UR AUTO: YELLOW
ERYTHROCYTE [DISTWIDTH] IN BLOOD BY AUTOMATED COUNT: 14.6 % (ref 10–15)
GLUCOSE UR STRIP-MCNC: NEGATIVE MG/DL
HCT VFR BLD AUTO: 38.7 % (ref 35–47)
HGB BLD-MCNC: 12.5 G/DL (ref 11.7–15.7)
HGB UR QL STRIP: NEGATIVE
KETONES UR STRIP-MCNC: NEGATIVE MG/DL
LEUKOCYTE ESTERASE UR QL STRIP: ABNORMAL
MCH RBC QN AUTO: 29.1 PG (ref 26.5–33)
MCHC RBC AUTO-ENTMCNC: 32.3 G/DL (ref 31.5–36.5)
MCV RBC AUTO: 90 FL (ref 78–100)
NITRATE UR QL: NEGATIVE
NON-SQ EPI CELLS #/AREA URNS LPF: ABNORMAL /LPF
PH UR STRIP: 5 PH (ref 5–7)
PLATELET # BLD AUTO: 412 10E9/L (ref 150–450)
RBC # BLD AUTO: 4.3 10E12/L (ref 3.8–5.2)
RBC #/AREA URNS AUTO: ABNORMAL /HPF
SOURCE: ABNORMAL
SP GR UR STRIP: 1.02 (ref 1–1.03)
UROBILINOGEN UR STRIP-ACNC: 0.2 EU/DL (ref 0.2–1)
WBC # BLD AUTO: 10.5 10E9/L (ref 4–11)
WBC #/AREA URNS AUTO: ABNORMAL /HPF

## 2020-09-21 PROCEDURE — 81001 URINALYSIS AUTO W/SCOPE: CPT | Performed by: NURSE PRACTITIONER

## 2020-09-21 PROCEDURE — 99214 OFFICE O/P EST MOD 30 MIN: CPT | Performed by: NURSE PRACTITIONER

## 2020-09-21 PROCEDURE — 85027 COMPLETE CBC AUTOMATED: CPT | Performed by: NURSE PRACTITIONER

## 2020-09-21 PROCEDURE — 36415 COLL VENOUS BLD VENIPUNCTURE: CPT | Performed by: NURSE PRACTITIONER

## 2020-09-21 RX ORDER — FLUCONAZOLE 150 MG/1
150 TABLET ORAL DAILY
Qty: 3 TABLET | Refills: 0 | Status: SHIPPED | OUTPATIENT
Start: 2020-09-21 | End: 2020-09-24

## 2020-09-21 RX ORDER — LORATADINE 10 MG/1
10 TABLET ORAL DAILY PRN
COMMUNITY

## 2020-09-21 ASSESSMENT — MIFFLIN-ST. JEOR: SCORE: 1136.39

## 2020-09-21 NOTE — PATIENT INSTRUCTIONS
Thank you for choosing [facility].  You may be receiving an email and/or telephone survey request from [facility] Customer Experience regarding your visit today.  Please take a few minutes to respond to the survey to let us know how we are doing.     You are due for a screening Mammogram.  Please contact the Diagnostics Registration Department at: [phone] to schedule this appointment.    If you have questions or concerns, please contact us via [employer] or you can contact your care team at [phone].    Our Clinic hours are:  Monday 6:40 am  to 7:00 pm  Tuesday -Friday 6:40 am to 5:00 pm    The [place] outpatient lab hours are:  Monday - Friday 6:10 am to 4:45 pm  Saturdays 7:00 am to 11:00 am  Appointments are required, call [phone]    If you have clinical questions after hours or would like to schedule an appointment,  call the clinic at [phone].

## 2020-09-21 NOTE — PROGRESS NOTES
Subjective     Sofia Shay is a 70 year old female who presents to clinic today for the following health issues:    HPI       Hospital Follow-up Visit:    Hospital/Nursing Home/IP Rehab Facility: [facility]  Date of Admission: 9/9/20  Date of Discharge: 9/14/20  Reason(s) for Admission: pyelonephritis with septic shock      Was your hospitalization related to COVID-19? No   Problems taking medications regularly:  None  Medication changes since discharge: None  Problems adhering to non-medication therapy:  None  Patient is shaky and nauseated from the antibiotic- decreased appetite  Patient states the medication has given her a vaginal yeast infection     Tongue is sore and red-  She doesn't think its thrush    Summary of hospitalization:  [facility] discharge summary reviewed  Diagnostic Tests/Treatments reviewed.  Follow up needed: none  Other Healthcare Providers Involved in Patient s Care:         None  Update since discharge: improved.       Post Discharge Medication Reconciliation: discharge medications reconciled, continue medications without change.  Plan of care communicated with patient and family              HPI: 70-year-old female who was seen in clinic on September 2 with UTI symptoms.  Patient had a positive UA in clinic and was treated with Bactrim.  Culture was negative; patient continued antibiotics for total of 9 doses.  She then presented to the ER on September 9 with generalized fatigue, fever and chills.  Repeat urinalysis was again positive blood cultures taken were also positive for E. coli.  Patient was started on vancomycin and Maxipime and admitted to hospital.  Cultures came back positive to Rocephin and Cipro.  By time of discharge patient was afebrile with improving white blood cell count.  She was discharged on Cipro for 8 more days.    Today patient reports that she is feeling better than she was at time of discharge.  However continues to be more fatigued  "than normal.  Denies any fever or chills.  She states that the Cipro is making her sick.  She is having GI side effects including nausea and poor appetite.  She has 2 doses left and would like to stop it now.  She denies any urinary symptoms at this time.  She is complaining of vaginal yeast infection as well as her tongue being red and raw.          Review of Systems   Constitutional, HEENT, cardiovascular, pulmonary, GI, , musculoskeletal, neuro, skin, endocrine and psych systems are negative, except as otherwise noted.      Objective    /70 (BP Location: Right arm)   Pulse 80   Temp 98.4  F (36.9  C) (Tympanic)   Ht 1.549 m (5' 1\")   Wt 68.9 kg (151 lb 14.4 oz)   SpO2 97%   BMI 28.70 kg/m    Body mass index is 28.7 kg/m .  Physical Exam   GENERAL: healthy, alert and no distress  NECK: no adenopathy, no asymmetry, masses, or scars and thyroid normal to palpation  RESP: lungs clear to auscultation - no rales, rhonchi or wheezes  CV: regular rate and rhythm, normal S1 S2, no S3 or S4, no murmur, click or rub, no peripheral edema and peripheral pulses strong  ABDOMEN: soft, nontender, no hepatosplenomegaly, no masses and bowel sounds normal  MS: no gross musculoskeletal defects noted, no edema    Results for orders placed or performed in visit on 09/21/20 (from the past 24 hour(s))   CBC with platelets   Result Value Ref Range    WBC 10.5 4.0 - 11.0 10e9/L    RBC Count 4.30 3.8 - 5.2 10e12/L    Hemoglobin 12.5 11.7 - 15.7 g/dL    Hematocrit 38.7 35.0 - 47.0 %    MCV 90 78 - 100 fl    MCH 29.1 26.5 - 33.0 pg    MCHC 32.3 31.5 - 36.5 g/dL    RDW 14.6 10.0 - 15.0 %    Platelet Count 412 150 - 450 10e9/L   *UA reflex to Microscopic and Culture ([place] and [facility] (except [place] and [place])    Specimen: Midstream Urine   Result Value Ref Range    Color Urine Yellow     Appearance Urine Clear     Glucose Urine Negative NEG^Negative mg/dL    Bilirubin Urine Negative NEG^Negative    Ketones Urine " Negative NEG^Negative mg/dL    Specific Gravity Urine 1.020 1.003 - 1.035    Blood Urine Negative NEG^Negative    pH Urine 5.0 5.0 - 7.0 pH    Protein Albumin Urine Negative NEG^Negative mg/dL    Urobilinogen Urine 0.2 0.2 - 1.0 EU/dL    Nitrite Urine Negative NEG^Negative    Leukocyte Esterase Urine Trace (A) NEG^Negative    Source Midstream Urine    Urine Microscopic   Result Value Ref Range    WBC Urine 0 - 5 OTO5^0 - 5 /HPF    RBC Urine O - 2 OTO2^O - 2 /HPF    Squamous Epithelial /LPF Urine Moderate (A) FEW^Few /LPF    Bacteria Urine Few (A) NEG^Negative /HPF           Assessment & Plan     Pyelonephritis  Symptomatically improved with antibiotics.  CBC and UA have returned to normal.  Follow-up for any return of symptoms.  - CBC with platelets  - *UA reflex to Microscopic and Culture ([place] and [facility] (except [place] and [place])  - Urine Microscopic    SIRS (systemic inflammatory response syndrome) (H)  Resolved.  - CBC with platelets  - *UA reflex to Microscopic and Culture ([place] and [facility] (except [place] and [place])    Yeast infection of the vagina  From antibiotics.  - fluconazole (DIFLUCAN) 150 MG tablet; Take 1 tablet (150 mg) by mouth daily for 3 days    Urge incontinence of urine  Patient has chronic issues with incontinence and is requesting referral to urology.  - UROLOGY ADULT REFERRAL; Future    Hospital discharge follow-up  - CBC with platelets  - *UA reflex to Microscopic and Culture ([place] and [facility] (except [place] and [place])           Return in about 1 week (around 9/28/2020), or if symptoms worsen or fail to improve.    The risks, benefits and treatment options of prescribed medications or other treatments have been discussed with the patient. The patient verbalized their understanding and should call or follow up if no improvement or if they develop further problems.    ANN MARIE Pulido [facility]

## 2020-09-30 ENCOUNTER — MYC MEDICAL ADVICE (OUTPATIENT)
Dept: FAMILY MEDICINE | Facility: CLINIC | Age: 70
End: 2020-09-30

## 2020-09-30 NOTE — TELEPHONE ENCOUNTER
Zulma,    Patient sends my chart with a complaint of bitter taste in her mouth.  Do you want to see her? Mile PETERSON RN

## 2020-10-01 NOTE — TELEPHONE ENCOUNTER
I called and left a message for patient to call back - I would like to talk to her myself.  Zulma Harris, CNP

## 2020-10-02 NOTE — TELEPHONE ENCOUNTER
Patient called back.    Feeling better  No longer dizzy.  Still tired - better than it was.  Everything tastes bitter - bitter taste is in the back on her tongue.  Stomach aches at night.  Not eating very much.  No throat pain.      Patient's main concern is the bitter taste in her mouth.  She thinks this is a side effect from the antibiotics she was on, which is possible.  Her daughter looked at her tongue and throat while we were on the phone - no redness or white coating.  Patient denies acid reflux.    Monitor for 2 weeks.  If still present, call back.  Zulma Harris, CNP

## 2020-10-02 NOTE — TELEPHONE ENCOUNTER
I called and left another message for patient to call back so I can talk to her.  Would like an update on how she is feeling.  Zulma Harris, CNP

## 2020-10-20 ENCOUNTER — OFFICE VISIT (OUTPATIENT)
Dept: UROLOGY | Facility: CLINIC | Age: 70
End: 2020-10-20
Payer: MEDICARE

## 2020-10-20 DIAGNOSIS — N39.46 MIXED STRESS AND URGE URINARY INCONTINENCE: Primary | ICD-10-CM

## 2020-10-20 DIAGNOSIS — Z87.440 RECENT URINARY TRACT INFECTION: ICD-10-CM

## 2020-10-20 LAB
ALBUMIN UR-MCNC: NEGATIVE MG/DL
APPEARANCE UR: CLEAR
BACTERIA #/AREA URNS HPF: ABNORMAL /HPF
BILIRUB UR QL STRIP: NEGATIVE
COLOR UR AUTO: YELLOW
GLUCOSE UR STRIP-MCNC: NEGATIVE MG/DL
HGB UR QL STRIP: ABNORMAL
KETONES UR STRIP-MCNC: NEGATIVE MG/DL
LEUKOCYTE ESTERASE UR QL STRIP: ABNORMAL
NITRATE UR QL: NEGATIVE
PH UR STRIP: 5.5 PH (ref 5–7)
RBC #/AREA URNS AUTO: ABNORMAL /HPF
SOURCE: ABNORMAL
SP GR UR STRIP: <=1.005 (ref 1–1.03)
UROBILINOGEN UR STRIP-ACNC: 0.2 EU/DL (ref 0.2–1)
WBC #/AREA URNS AUTO: ABNORMAL /HPF

## 2020-10-20 PROCEDURE — 99204 OFFICE O/P NEW MOD 45 MIN: CPT | Performed by: UROLOGY

## 2020-10-20 PROCEDURE — 81001 URINALYSIS AUTO W/SCOPE: CPT | Performed by: UROLOGY

## 2020-10-20 PROCEDURE — 87086 URINE CULTURE/COLONY COUNT: CPT | Performed by: UROLOGY

## 2020-10-20 RX ORDER — CEPHALEXIN 500 MG/1
500 CAPSULE ORAL 3 TIMES DAILY
Qty: 9 CAPSULE | Refills: 0 | Status: SHIPPED | OUTPATIENT
Start: 2020-10-20 | End: 2020-10-23

## 2020-10-20 RX ORDER — MIRABEGRON 50 MG/1
50 TABLET, EXTENDED RELEASE ORAL DAILY
Qty: 30 TABLET | Refills: 1 | Status: SHIPPED | OUTPATIENT
Start: 2020-10-20 | End: 2022-02-21
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
## 2020-10-21 LAB
BACTERIA SPEC CULT: NORMAL
Lab: NORMAL
SPECIMEN SOURCE: NORMAL
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
## 2020-11-24 ENCOUNTER — VIRTUAL VISIT (OUTPATIENT)
Dept: UROLOGY | Facility: CLINIC | Age: 70
End: 2020-11-24
Payer: MEDICARE

## 2020-11-24 DIAGNOSIS — Z87.440 RECENT URINARY TRACT INFECTION: ICD-10-CM

## 2020-11-24 DIAGNOSIS — N39.46 MIXED STRESS AND URGE URINARY INCONTINENCE: ICD-10-CM

## 2020-11-24 DIAGNOSIS — N39.46 MIXED STRESS AND URGE URINARY INCONTINENCE: Primary | ICD-10-CM

## 2020-11-24 LAB
ALBUMIN UR-MCNC: NEGATIVE MG/DL
APPEARANCE UR: CLEAR
BILIRUB UR QL STRIP: NEGATIVE
COLOR UR AUTO: YELLOW
GLUCOSE UR STRIP-MCNC: NEGATIVE MG/DL
HGB UR QL STRIP: NEGATIVE
KETONES UR STRIP-MCNC: NEGATIVE MG/DL
LEUKOCYTE ESTERASE UR QL STRIP: ABNORMAL
NITRATE UR QL: NEGATIVE
NON-SQ EPI CELLS #/AREA URNS LPF: ABNORMAL /LPF
PH UR STRIP: 5.5 PH (ref 5–7)
RBC #/AREA URNS AUTO: ABNORMAL /HPF
SOURCE: ABNORMAL
SP GR UR STRIP: 1.01 (ref 1–1.03)
UROBILINOGEN UR STRIP-ACNC: 0.2 EU/DL (ref 0.2–1)
WBC #/AREA URNS AUTO: ABNORMAL /HPF

## 2020-11-24 PROCEDURE — 81001 URINALYSIS AUTO W/SCOPE: CPT | Performed by: UROLOGY

## 2020-11-24 PROCEDURE — 99441 PR PHYSICIAN TELEPHONE EVALUATION 5-10 MIN: CPT | Performed by: UROLOGY

## 2020-11-24 RX ORDER — OXYBUTYNIN CHLORIDE 5 MG/1
5 TABLET, EXTENDED RELEASE ORAL DAILY
Qty: 30 TABLET | Refills: 1 | Status: SHIPPED | OUTPATIENT
Start: 2020-11-24 | End: 2022-02-21

## 2020-12-06 ENCOUNTER — HEALTH MAINTENANCE LETTER (OUTPATIENT)
Age: 70
End: 2020-12-06

## 2021-01-15 ENCOUNTER — HEALTH MAINTENANCE LETTER (OUTPATIENT)
Age: 71
End: 2021-01-15

## 2021-04-12 ENCOUNTER — TELEPHONE (OUTPATIENT)
Dept: FAMILY MEDICINE | Facility: CLINIC | Age: 71
End: 2021-04-12

## 2021-04-12 NOTE — LETTER
[facility]  [address] [address]  [phone]    April 12, 2021    Sofia Shay  [address] [address]          Dear Sofia,    --Mammogram screening is generally recommended every year starting at age of 50.  Some women may choose to be screened at an earlier age ( between 40 & 50) depending on risk factors and discussions with her primary provider.   Please call [phone] to schedule a mammogram  --Colon cancer screening is generally recommended in all patients over the age of 50 years of age. This can be with either colonoscopy every 10 years, or testing the stool for blood one time per year (called a FIT test, a simple card you can send back to us in the mail). On review of our electronic medical record it appears you are due for colon cancer screening. Please call the clinic to assist in setting up a colonoscopy or, if you prefer, setting up the test for stool blood(FIT).    Please disregard this notice if you have already scheduled an appointment.     Sincerely,    Zulma GROVER

## 2021-04-12 NOTE — TELEPHONE ENCOUNTER
Patient Quality Outreach Summary      Summary:    Patient is due/failing the following:   Colonoscopy and Breast Cancer Screening - Mammogram    Type of outreach:    Sent letter.    Questions for provider review:    None                                                                                                                    CASTILLO TUCKER

## 2021-04-15 ENCOUNTER — IMMUNIZATION (OUTPATIENT)
Dept: FAMILY MEDICINE | Facility: CLINIC | Age: 71
End: 2021-04-15
Payer: MEDICARE

## 2021-04-15 PROCEDURE — 0011A PR COVID VAC MODERNA 100 MCG/0.5 ML IM: CPT

## 2021-04-15 PROCEDURE — 91301 PR COVID VAC MODERNA 100 MCG/0.5 ML IM: CPT

## 2021-05-13 ENCOUNTER — IMMUNIZATION (OUTPATIENT)
Dept: FAMILY MEDICINE | Facility: CLINIC | Age: 71
End: 2021-05-13
Attending: FAMILY MEDICINE
Payer: MEDICARE

## 2021-05-13 PROCEDURE — 91301 PR COVID VAC MODERNA 100 MCG/0.5 ML IM: CPT

## 2021-05-13 PROCEDURE — 0012A PR COVID VAC MODERNA 100 MCG/0.5 ML IM: CPT

## 2021-06-15 ENCOUNTER — ANCILLARY PROCEDURE (OUTPATIENT)
Dept: MAMMOGRAPHY | Facility: CLINIC | Age: 71
End: 2021-06-15
Payer: MEDICARE

## 2021-06-15 DIAGNOSIS — Z12.31 VISIT FOR SCREENING MAMMOGRAM: ICD-10-CM

## 2021-06-15 PROCEDURE — 77067 SCR MAMMO BI INCL CAD: CPT | Mod: TC | Performed by: RADIOLOGY

## 2021-07-13 DIAGNOSIS — F41.9 ANXIETY: ICD-10-CM

## 2021-07-16 RX ORDER — CLONAZEPAM 0.5 MG/1
TABLET ORAL
Qty: 30 TABLET | Refills: 0 | Status: SHIPPED | OUTPATIENT
Start: 2021-07-16 | End: 2022-07-14

## 2021-09-26 ENCOUNTER — HEALTH MAINTENANCE LETTER (OUTPATIENT)
Age: 71
End: 2021-09-26

## 2021-11-22 ENCOUNTER — IMMUNIZATION (OUTPATIENT)
Dept: FAMILY MEDICINE | Facility: CLINIC | Age: 71
End: 2021-11-22
Payer: MEDICARE

## 2021-11-22 ENCOUNTER — TRANSFERRED RECORDS (OUTPATIENT)
Dept: HEALTH INFORMATION MANAGEMENT | Facility: CLINIC | Age: 71
End: 2021-11-22

## 2021-11-22 PROCEDURE — 0013A PR COVID VAC MODERNA 100 MCG/0.5 ML IM: CPT

## 2021-11-22 PROCEDURE — 91301 PR COVID VAC MODERNA 100 MCG/0.5 ML IM: CPT

## 2021-12-01 DIAGNOSIS — F41.9 ANXIETY: ICD-10-CM

## 2021-12-01 DIAGNOSIS — K21.9 GASTROESOPHAGEAL REFLUX DISEASE WITHOUT ESOPHAGITIS: ICD-10-CM

## 2021-12-06 RX ORDER — SERTRALINE HYDROCHLORIDE 100 MG/1
TABLET, FILM COATED ORAL
Qty: 135 TABLET | Refills: 0 | Status: SHIPPED | OUTPATIENT
Start: 2021-12-06 | End: 2022-03-28

## 2021-12-06 RX ORDER — PANTOPRAZOLE SODIUM 40 MG/1
TABLET, DELAYED RELEASE ORAL
Qty: 90 TABLET | Refills: 0 | Status: SHIPPED | OUTPATIENT
Start: 2021-12-06 | End: 2022-03-28

## 2021-12-10 ENCOUNTER — TELEPHONE (OUTPATIENT)
Dept: FAMILY MEDICINE | Facility: CLINIC | Age: 71
End: 2021-12-10

## 2021-12-10 ENCOUNTER — OFFICE VISIT (OUTPATIENT)
Dept: FAMILY MEDICINE | Facility: CLINIC | Age: 71
End: 2021-12-10
Payer: MEDICARE

## 2021-12-10 VITALS
DIASTOLIC BLOOD PRESSURE: 72 MMHG | TEMPERATURE: 97.4 F | OXYGEN SATURATION: 96 % | HEART RATE: 80 BPM | HEIGHT: 61 IN | SYSTOLIC BLOOD PRESSURE: 104 MMHG | BODY MASS INDEX: 28.89 KG/M2 | WEIGHT: 153 LBS

## 2021-12-10 DIAGNOSIS — L30.9 DERMATITIS: Primary | ICD-10-CM

## 2021-12-10 DIAGNOSIS — H10.33 ACUTE BACTERIAL CONJUNCTIVITIS OF BOTH EYES: ICD-10-CM

## 2021-12-10 PROCEDURE — 99213 OFFICE O/P EST LOW 20 MIN: CPT | Performed by: NURSE PRACTITIONER

## 2021-12-10 RX ORDER — POLYMYXIN B SULFATE AND TRIMETHOPRIM 1; 10000 MG/ML; [USP'U]/ML
1-2 SOLUTION OPHTHALMIC EVERY 4 HOURS
Qty: 10 ML | Refills: 0 | Status: SHIPPED | OUTPATIENT
Start: 2021-12-10 | End: 2023-04-05

## 2021-12-10 RX ORDER — PREDNISONE 20 MG/1
40 TABLET ORAL DAILY
Qty: 10 TABLET | Refills: 0 | Status: SHIPPED | OUTPATIENT
Start: 2021-12-10 | End: 2021-12-15

## 2021-12-10 RX ORDER — TRIAMCINOLONE ACETONIDE 1 MG/G
OINTMENT TOPICAL 2 TIMES DAILY
Qty: 30 G | Refills: 1 | Status: SHIPPED | OUTPATIENT
Start: 2021-12-10 | End: 2023-04-05

## 2021-12-10 ASSESSMENT — MIFFLIN-ST. JEOR: SCORE: 1136.38

## 2021-12-10 NOTE — PROGRESS NOTES
"  Assessment & Plan     Dermatitis  Eczema appearing rash surrounding eyes and nose.  We will treat with 5-day course of prednisone.  May also use thin layer of triamcinolone sparingly twice daily for a week.  Send a [employer] message next week if no improvement.  - predniSONE (DELTASONE) 20 MG tablet; Take 2 tablets (40 mg) by mouth daily for 5 days  - triamcinolone (KENALOG) 0.1 % external ointment; Apply topically 2 times daily    Acute bacterial conjunctivitis of both eyes  - trimethoprim-polymyxin b (POLYTRIM) 64748-5.1 UNIT/ML-% ophthalmic solution; Place 1-2 drops into both eyes every 4 hours      The risks, benefits and treatment options of prescribed medications or other treatments have been discussed with the patient. The patient verbalized their understanding and should call or follow up if no improvement or if they develop further problems.    ANN MARIE Pulido CNP  [facility] Black is a 71 year old who presents for the following health issues     HPI     Eye(s) Problem  Onset/Duration: gradually getting worse ; started a couple months ago  Description:   Location: both eyes  Pain: YES  Redness: YES  Itchy  Sores on face  Accompanying Signs & Symptoms:  Discharge/mattering: YES  Swelling: YES  Visual changes: YES  Fever: no  Nasal Congestion: runny nose  Bothered by bright lights: no  History:  Trauma: no  Foreign body exposure: no  Wearing contacts: no  Precipitating or alleviating factors: None  Therapies tried and outcome: otc eye drop;  Saw Eye DrLaure   Told to use hot packs and artificial tears        Review of Systems   Constitutional, HEENT, cardiovascular, pulmonary, gi and gu systems are negative, except as otherwise noted.      Objective    /72 (BP Location: Right arm)   Pulse 80   Temp 97.4  F (36.3  C) (Tympanic)   Ht 1.549 m (5' 1\")   Wt 69.4 kg (153 lb)   SpO2 96%   BMI 28.91 kg/m    Body mass index is 28.91 kg/m .  Physical Exam "   GENERAL: healthy, alert and no distress  EYES: conjunctiva/corneas- conjunctival injection bilateral.  Yellow crust noted in the corner of her eyes.  HENT: Skin over bilateral upper and lower eyelids is erythematous and rough.  Erythema and roughness with some scale extends down the skin surrounding her nose and cheeks.  Skin under nostrils is erythematous and cracked.

## 2021-12-10 NOTE — TELEPHONE ENCOUNTER
Forwarding request to be squeezed in today to PCP.  Please see initial message below.  Scheduling had already scheduled patient for 12/15/21, and I moved patient to Monday, but daughter Airam is requesting to ask if you'll fit patient in today. I did inform her your schedule is booked, but she states you've made an accommodation for patient in the past and might consider this.  They declined urgent care and patient will only see you.     Roxana Carbone RN  [facility]

## 2021-12-10 NOTE — TELEPHONE ENCOUNTER
Reason for call:    Symptom or request:     Daughter called stating that is miserable due to left swollen eye pain-- around eye, eyes burning, runny nose, mattered x 3weeks. Vision is fine. Seen eye dr told her it was inflammation.       Best Time:  any    Can we leave a detailed message on this number?  YES     Haley BRANDT  [facility]

## 2021-12-10 NOTE — TELEPHONE ENCOUNTER
Huddled with PCP, who states she can double-book patient today at 1pm.  Daughter Airam notified, and appointment switched to today.  They were instructed to arrive 15 minutes early.     Roxana Carbone RN  [facility]

## 2022-01-04 ENCOUNTER — TRANSFERRED RECORDS (OUTPATIENT)
Dept: HEALTH INFORMATION MANAGEMENT | Facility: CLINIC | Age: 72
End: 2022-01-04
Payer: MEDICARE

## 2022-01-16 ENCOUNTER — HEALTH MAINTENANCE LETTER (OUTPATIENT)
Age: 72
End: 2022-01-16

## 2022-01-22 ENCOUNTER — TRANSFERRED RECORDS (OUTPATIENT)
Dept: HEALTH INFORMATION MANAGEMENT | Facility: CLINIC | Age: 72
End: 2022-01-22
Payer: MEDICARE

## 2022-02-01 ENCOUNTER — TRANSFERRED RECORDS (OUTPATIENT)
Dept: HEALTH INFORMATION MANAGEMENT | Facility: CLINIC | Age: 72
End: 2022-02-01
Payer: MEDICARE

## 2022-02-21 ENCOUNTER — OFFICE VISIT (OUTPATIENT)
Dept: OPTOMETRY | Facility: CLINIC | Age: 72
End: 2022-02-21
Payer: MEDICARE

## 2022-02-21 ENCOUNTER — TELEPHONE (OUTPATIENT)
Dept: OPTOMETRY | Facility: CLINIC | Age: 72
End: 2022-02-21

## 2022-02-21 DIAGNOSIS — H04.123 DRY EYES, BILATERAL: ICD-10-CM

## 2022-02-21 DIAGNOSIS — H16.143 PUNCTATE KERATITIS OF BOTH EYES: ICD-10-CM

## 2022-02-21 DIAGNOSIS — H02.9 EYELID LESION: Primary | ICD-10-CM

## 2022-02-21 PROCEDURE — 99203 OFFICE O/P NEW LOW 30 MIN: CPT | Performed by: OPTOMETRIST

## 2022-02-21 ASSESSMENT — VISUAL ACUITY
OD_CC+: -1
OD_CC: 20/30
OS_CC+: -1
CORRECTION_TYPE: GLASSES
METHOD: SNELLEN - LINEAR
OS_CC: 20/25

## 2022-02-21 NOTE — TELEPHONE ENCOUNTER
Per referral request from Dr. Jones, I called and lvm with patient to get scheduled with Dr. Sweet for a lid lesion consult. I left the scheduling line for her to call back. Also okay to schedule at [facility] [place] [place] with Dr. Sweet if pt wishes to be seen sooner. Non-urgent per Karen Lock.    TAMIA PATIÑO, 2:58 PM 02/21/2022

## 2022-02-21 NOTE — LETTER
"    2/21/2022         RE: Sofia Shay  [address] [facility] [address]        Dear Colleague,    Thank you for referring your patient, Sofia Shay, to the [facility]. Please see a copy of my visit note below.    Chief Complaint   Patient presents with     Eye Problem Left Eye       Chief Complaint(s) and History of Present Illness(es)     Eye Problem Left Eye     Laterality: left eye    Onset: gradual    Onset: 2 months ago    Associated symptoms: itching, swelling, redness, tearing, discharge, foreign body sensation, eye pain and dryness    Treatments tried: eye drops and ointment    Response to treatment: mild improvement    Pain scale: 0/10              Comments     Patient has seen 2 doctors already.  [facility]  and eye exam at [facility].  She was first seen when the eyes were red and irritated. She was given a steroid and an ointment. It responded, but never went totally away.  She is concerned now because it seems to swell worse at night and is uncomfortable at night than before . She has been having lots of discharge over the night. In the morning her lashes are all sticky and she feels like she's losing them as she wipes away the goop. She was putting warm compresses on in the beginning but hasn't for awhile because it doesn't seem to do much now. She thinks that this all started at least a couple months ago. Admits to scrubbing lids to get them clean.  Thinks she scratched upper lid.    She said that she feels like she has extreme discomfort as opposed to \"pain\" She said that worst symptoms at night-  Feels dry and \" a pulling\" sensation     She also mentioned that there always seems to be a \"film\" over her eyes and that it is bad enough that it makes it hard to drive.                                 Елена Schulz Optometric Assistant      Review of Symptoms    EYES: See HPI  CONSTITUTIONAL: POSITIVE  for cold with runny " "nose between Thanksgiving and [place].   Mentioned that nose was soar from blowing it so frequently. She reports colds often turns into sinus infection but not with most recent one     Reports dry air at home  Due to leg brace has been sleeping on recliner for past 4 weeks.    Normally she would sleep on left side.    Recently upon waking there is always a \"yellow crystal \" on left upper lid  Left upper lid swelling is worse at night - lower lid some swelling too    Uses drop a few times a day when eye bugs her  Some sting upon insertion of drop if eye is really irritated     Uncomfortable to keep left eye open at times, \"pulling sensation\"    she reports no photophobia, no swollen ankles     She reports a  chronic runny nose , uses allergy over the counter pills when needed.  Can feel pressure  in ears from allergies,     change in vison uncomfortable at night - had to wipe eye continually  To try to clear due the film      She reported drinking 1/2 pot coffe, and  2 liters of diet pepsi per day        Medical, surgical and family histories reviewed and updated 2/21/2022.         OBJECTIVE: See Ophthalmology exam    ASSESSMENT:    ICD-10-CM    1. Eyelid lesion - Left Eye upper lid  H02.9 Adult Eye Referral   2. Punctate keratitis of both eyes  H16.143     Left >> right   3. Dry eyes, bilateral  H04.123       PLAN:    Patient Instructions   Records arrived 3-3-2022. Added plan to AVS 3/6/2022  and sent my chart message - will mail copy of AVS to patient on 3-7-2022. Jacquelyn Jones, OD     When an eyes gets too dry, the front surface gets chapped.    This causes filmy vision, light sensitivity and sometimes itchy eyes.    The body tries to help itself by creating more eye mucus.    This mucus coats the lids when sleeping.     To make eyes feel better you need to add more moisture in your home, drink more water, use artificial tears at least four times a day, and gently clean lids twice a day.     Please use Soothe " Lubricant Eye Drop Preservative Free in vials four times a day or more for comfort.    Regular use will slowly solve filmy vision, itchy sensation and excessive matter upon waking.    Be gentle washing lids, soak with warm water 5 minutes first and then gently remove matter from lids.  Do not scrub lids.      Take blink breaks during screen time or when reading.    Refer to Dr Batista due to appearance of upper lid lesion.    RTC as needed for eye care.      Please let me know if you have any questions,     Jacquelyn Jones, OD  [phone]                                 Again, thank you for allowing me to participate in the care of your patient.        Sincerely,        Jacquelyn Jones, OD

## 2022-02-21 NOTE — PROGRESS NOTES
"Chief Complaint   Patient presents with     Eye Problem Left Eye       Chief Complaint(s) and History of Present Illness(es)     Eye Problem Left Eye     Laterality: left eye    Onset: gradual    Onset: 2 months ago    Associated symptoms: itching, swelling, redness, tearing, discharge, foreign body sensation, eye pain and dryness    Treatments tried: eye drops and ointment    Response to treatment: mild improvement    Pain scale: 0/10              Comments     Patient has seen 2 doctors already.  [facility]  and eye exam at [facility].  She was first seen when the eyes were red and irritated. She was given a steroid and an ointment. It responded, but never went totally away.  She is concerned now because it seems to swell worse at night and is uncomfortable at night than before . She has been having lots of discharge over the night. In the morning her lashes are all sticky and she feels like she's losing them as she wipes away the goop. She was putting warm compresses on in the beginning but hasn't for awhile because it doesn't seem to do much now. She thinks that this all started at least a couple months ago. Admits to scrubbing lids to get them clean.  Thinks she scratched upper lid.    She said that she feels like she has extreme discomfort as opposed to \"pain\" She said that worst symptoms at night-  Feels dry and \" a pulling\" sensation     She also mentioned that there always seems to be a \"film\" over her eyes and that it is bad enough that it makes it hard to drive.                                 Елена Schulz Optometric Assistant      Review of Symptoms    EYES: See HPI  CONSTITUTIONAL: POSITIVE  for cold with runny nose between Thanksgiving and [place].   Mentioned that nose was soar from blowing it so frequently. She reports colds often turns into sinus infection but not with most recent one     Reports dry air at home  Due to leg brace has been sleeping on recliner for past 4 " "weeks.    Normally she would sleep on left side.    Recently upon waking there is always a \"yellow crystal \" on left upper lid  Left upper lid swelling is worse at night - lower lid some swelling too    Uses drop a few times a day when eye bugs her  Some sting upon insertion of drop if eye is really irritated     Uncomfortable to keep left eye open at times, \"pulling sensation\"    she reports no photophobia, no swollen ankles     She reports a  chronic runny nose , uses allergy over the counter pills when needed.  Can feel pressure  in ears from allergies,     change in vison uncomfortable at night - had to wipe eye continually  To try to clear due the film      She reported drinking 1/2 pot coffe, and  2 liters of diet pepsi per day        Medical, surgical and family histories reviewed and updated 2/21/2022.         OBJECTIVE: See Ophthalmology exam    ASSESSMENT:    ICD-10-CM    1. Eyelid lesion - Left Eye upper lid  H02.9 Adult Eye Referral   2. Punctate keratitis of both eyes  H16.143     Left >> right   3. Dry eyes, bilateral  H04.123       PLAN:    Patient Instructions   Records arrived 3-3-2022. Added plan to AVS 3/6/2022  and sent my chart message - will mail copy of AVS to patient on 3-7-2022. Jacquelyn Jones, OD     When an eyes gets too dry, the front surface gets chapped.    This causes filmy vision, light sensitivity and sometimes itchy eyes.    The body tries to help itself by creating more eye mucus.    This mucus coats the lids when sleeping.     To make eyes feel better you need to add more moisture in your home, drink more water, use artificial tears at least four times a day, and gently clean lids twice a day.     Please use Soothe Lubricant Eye Drop Preservative Free in vials four times a day or more for comfort.    Regular use will slowly solve filmy vision, itchy sensation and excessive matter upon waking.    Be gentle washing lids, soak with warm water 5 minutes first and then gently remove " matter from lids.  Do not scrub lids.      Take blink breaks during screen time or when reading.    Refer to Dr Batista due to appearance of upper lid lesion.    RTC as needed for eye care.      Please let me know if you have any questions,     Jacquelyn Jones, OD  [phone]

## 2022-03-01 ENCOUNTER — TRANSFERRED RECORDS (OUTPATIENT)
Dept: HEALTH INFORMATION MANAGEMENT | Facility: CLINIC | Age: 72
End: 2022-03-01
Payer: MEDICARE

## 2022-03-06 NOTE — PATIENT INSTRUCTIONS
Records arrived 3-3-2022. Added plan to AVS 3/6/2022  and sent my chart message - will mail copy of AVS to patient on 3-7-2022. Jacquelyn Jones, OD     When an eyes gets too dry, the front surface gets chapped.    This causes filmy vision, light sensitivity and sometimes itchy eyes.    The body tries to help itself by creating more eye mucus.    This mucus coats the lids when sleeping.     To make eyes feel better you need to add more moisture in your home, drink more water, use artificial tears at least four times a day, and gently clean lids twice a day.     Please use Soothe Lubricant Eye Drop Preservative Free in vials four times a day or more for comfort.    Regular use will slowly solve filmy vision, itchy sensation and excessive matter upon waking.    Be gentle washing lids, soak with warm water 5 minutes first and then gently remove matter from lids.  Do not scrub lids.      Take blink breaks during screen time or when reading.    Refer to Dr Batista due to appearance of upper lid lesion.    RTC as needed for eye care.      Please let me know if you have any questions,     Jacquelyn Jones, OD  [phone]

## 2022-03-07 ENCOUNTER — TELEPHONE (OUTPATIENT)
Dept: OPTOMETRY | Facility: CLINIC | Age: 72
End: 2022-03-07
Payer: MEDICARE

## 2022-03-07 NOTE — TELEPHONE ENCOUNTER
3/7/2022  Per K[facility] I printed and mailed a copy of the patients AVS from her last visit.    Елена Schulz Optometric Assistant

## 2022-03-26 DIAGNOSIS — F41.9 ANXIETY: ICD-10-CM

## 2022-03-26 DIAGNOSIS — K21.9 GASTROESOPHAGEAL REFLUX DISEASE WITHOUT ESOPHAGITIS: ICD-10-CM

## 2022-03-28 RX ORDER — PANTOPRAZOLE SODIUM 40 MG/1
TABLET, DELAYED RELEASE ORAL
Qty: 90 TABLET | Refills: 1 | Status: SHIPPED | OUTPATIENT
Start: 2022-03-28 | End: 2022-10-26

## 2022-03-28 RX ORDER — SERTRALINE HYDROCHLORIDE 100 MG/1
TABLET, FILM COATED ORAL
Qty: 135 TABLET | Refills: 1 | Status: SHIPPED | OUTPATIENT
Start: 2022-03-28 | End: 2022-10-26

## 2022-04-11 ENCOUNTER — TRANSFERRED RECORDS (OUTPATIENT)
Dept: HEALTH INFORMATION MANAGEMENT | Facility: CLINIC | Age: 72
End: 2022-04-11
Payer: MEDICARE

## 2022-05-23 ENCOUNTER — OFFICE VISIT (OUTPATIENT)
Dept: FAMILY MEDICINE | Facility: CLINIC | Age: 72
End: 2022-05-23
Payer: MEDICARE

## 2022-05-23 VITALS
BODY MASS INDEX: 29.13 KG/M2 | WEIGHT: 154.3 LBS | HEIGHT: 61 IN | DIASTOLIC BLOOD PRESSURE: 82 MMHG | TEMPERATURE: 98.5 F | SYSTOLIC BLOOD PRESSURE: 126 MMHG | OXYGEN SATURATION: 93 % | RESPIRATION RATE: 20 BRPM | HEART RATE: 73 BPM

## 2022-05-23 DIAGNOSIS — M06.9 RHEUMATOID ARTHRITIS, INVOLVING UNSPECIFIED SITE, UNSPECIFIED WHETHER RHEUMATOID FACTOR PRESENT (H): ICD-10-CM

## 2022-05-23 DIAGNOSIS — J44.9 CHRONIC OBSTRUCTIVE PULMONARY DISEASE, UNSPECIFIED COPD TYPE (H): ICD-10-CM

## 2022-05-23 DIAGNOSIS — F32.0 MILD MAJOR DEPRESSION (H): ICD-10-CM

## 2022-05-23 DIAGNOSIS — M54.41 ACUTE BILATERAL LOW BACK PAIN WITH RIGHT-SIDED SCIATICA: Primary | ICD-10-CM

## 2022-05-23 DIAGNOSIS — Z23 HIGH PRIORITY FOR 2019-NCOV VACCINE: ICD-10-CM

## 2022-05-23 PROCEDURE — 99213 OFFICE O/P EST LOW 20 MIN: CPT | Mod: 25 | Performed by: NURSE PRACTITIONER

## 2022-05-23 PROCEDURE — 91306 COVID-19,PF,MODERNA (18+ YRS BOOSTER .25ML): CPT | Performed by: NURSE PRACTITIONER

## 2022-05-23 PROCEDURE — 0064A COVID-19,PF,MODERNA (18+ YRS BOOSTER .25ML): CPT | Performed by: NURSE PRACTITIONER

## 2022-05-23 RX ORDER — CYCLOBENZAPRINE HCL 10 MG
10 TABLET ORAL 3 TIMES DAILY PRN
COMMUNITY
Start: 2022-05-22 | End: 2023-12-20

## 2022-05-23 RX ORDER — OXYCODONE HYDROCHLORIDE 5 MG/1
TABLET ORAL
COMMUNITY
Start: 2022-05-22 | End: 2022-05-23

## 2022-05-23 RX ORDER — OXYCODONE AND ACETAMINOPHEN 5; 325 MG/1; MG/1
1-2 TABLET ORAL EVERY 6 HOURS PRN
Qty: 20 TABLET | Refills: 0 | Status: SHIPPED | OUTPATIENT
Start: 2022-05-23 | End: 2022-05-26

## 2022-05-23 RX ORDER — PREDNISONE 20 MG/1
40 TABLET ORAL DAILY
Qty: 10 TABLET | Refills: 0 | Status: SHIPPED | OUTPATIENT
Start: 2022-05-23 | End: 2022-05-28

## 2022-05-23 ASSESSMENT — PAIN SCALES - GENERAL: PAINLEVEL: EXTREME PAIN (8)

## 2022-05-23 NOTE — PROGRESS NOTES
Assessment & Plan     Acute bilateral low back pain with right-sided sciatica  - oxyCODONE-acetaminophen (PERCOCET) 5-325 MG tablet; Take 1-2 tablets by mouth every 6 hours as needed for pain  - predniSONE (DELTASONE) 20 MG tablet; Take 2 tablets (40 mg) by mouth daily for 5 days    High priority for 2019-nCoV vaccine  - COVID-19,PF,MODERNA (18+ Yrs BOOSTER .25mL)      Rheumatoid arthritis, involving unspecified site, unspecified whether rheumatoid factor present (H)  Known issue that I take into account for their medical decisions, no current exacerbations or new concerns    Chronic obstructive pulmonary disease, unspecified COPD type (H)  Known issue that I take into account for their medical decisions, no current exacerbations or new concerns    Mild major depression (H)  Known issue that I take into account for their medical decisions, no current exacerbations or new concerns      Patient Instructions   Percocet (pain medication) 1-2 tablets every 6 hours as needed for pain.  Prednisone 40 mg (2 tabs) once daily for 5 days.  Cyclobenzaprine (muscle relaxant) 1 tablet every 8 hours as needed (alternate with the pain medication).      After prednisone is completed, may take ibuprofen 600 mg every 6 hours as needed for pain.        Keep appointment with spine doctor on Friday.      The risks, benefits and treatment options of prescribed medications or other treatments have been discussed with the patient. The patient verbalized their understanding and should call or follow up if no improvement or if they develop further problems.    ANN MARIE Pulido [facility] Black is a 71 year old who presents for the following health issues  accompanied by her daughter.    [facility]     ED/UC Followup:    Facility:  [facility]   Date of visit: 5/22/2022  Reason for visit: Back Pain   Current Status: still having Right Low Back Pain , goes into hip and pelvis area . Pain is  "constant , has been 10/10. Currently 8/10. Difficult to do any movements      Had CT lumbar spine in the ER - high grade L3-L4 through L5-S1 neural foraminal stenosis. No high grade spinal canal stenosis.    CT abdomen/pelvis was normal.      Pain is unimproved.  She reports that although she has always have intermittent sciatica, this is the worst back pain she has ever had.  Nothing makes it better - has tried norco, oxycodone, ibuprofen, tylenol  Has an appointment with a spine specialist on Friday at [facility]          Review of Systems   Constitutional, HEENT, cardiovascular, pulmonary, gi and gu systems are negative, except as otherwise noted.      Objective    /82 (BP Location: Right arm, Patient Position: Chair, Cuff Size: Adult Regular)   Pulse 73   Temp 98.5  F (36.9  C) (Tympanic)   Resp 20   Ht 1.549 m (5' 1\")   Wt 70 kg (154 lb 4.8 oz)   SpO2 93%   Breastfeeding No   BMI 29.15 kg/m    Body mass index is 29.15 kg/m .  Physical Exam   GENERAL: healthy, alert and mild distress  MS: no gross musculoskeletal defects noted, no edema                "

## 2022-05-23 NOTE — PATIENT INSTRUCTIONS
Percocet (pain medication) 1-2 tablets every 6 hours as needed for pain.  Prednisone 40 mg (2 tabs) once daily for 5 days.  Cyclobenzaprine (muscle relaxant) 1 tablet every 8 hours as needed (alternate with the pain medication).      After prednisone is completed, may take ibuprofen 600 mg every 6 hours as needed for pain.        Keep appointment with spine doctor on Friday.

## 2022-05-27 ENCOUNTER — TRANSFERRED RECORDS (OUTPATIENT)
Dept: HEALTH INFORMATION MANAGEMENT | Facility: CLINIC | Age: 72
End: 2022-05-27
Payer: MEDICARE

## 2022-06-13 ENCOUNTER — TRANSFERRED RECORDS (OUTPATIENT)
Dept: HEALTH INFORMATION MANAGEMENT | Facility: CLINIC | Age: 72
End: 2022-06-13

## 2022-07-14 DIAGNOSIS — F41.9 ANXIETY: ICD-10-CM

## 2022-07-14 RX ORDER — CLONAZEPAM 0.5 MG/1
TABLET ORAL
Qty: 30 TABLET | Refills: 0 | Status: SHIPPED | OUTPATIENT
Start: 2022-07-14 | End: 2023-03-27

## 2022-07-14 NOTE — TELEPHONE ENCOUNTER
Routing refill request to provider for review/approval because:  Drug not on the [facility] [place]     Pending Prescriptions:                       Disp   Refills    clonazePAM (KLONOPIN) 0.5 MG tablet [Pharm*30 tab*0        Sig: Take 1 tablet by mouth twice daily as needed for           anxiety    Requested Prescriptions   Pending Prescriptions Disp Refills    clonazePAM (KLONOPIN) 0.5 MG tablet [Pharmacy Med Name: clonazePAM 0.5 MG Oral Tablet] 30 tablet 0     Sig: Take 1 tablet by mouth twice daily as needed for anxiety        There is no refill protocol information for this order         Rosio Hernandez RN on 7/14/2022 at 2:17 PM

## 2022-09-25 ENCOUNTER — MYC MEDICAL ADVICE (OUTPATIENT)
Dept: FAMILY MEDICINE | Facility: CLINIC | Age: 72
End: 2022-09-25

## 2022-11-09 ENCOUNTER — HOSPITAL ENCOUNTER (OUTPATIENT)
Dept: MAMMOGRAPHY | Facility: CLINIC | Age: 72
Discharge: HOME | End: 2022-11-09
Admitting: NURSE PRACTITIONER
Payer: MEDICARE

## 2022-11-09 DIAGNOSIS — Z12.31 VISIT FOR SCREENING MAMMOGRAM: ICD-10-CM

## 2022-11-09 PROCEDURE — 77067 SCR MAMMO BI INCL CAD: CPT

## 2022-11-21 ENCOUNTER — IMMUNIZATION (OUTPATIENT)
Dept: FAMILY MEDICINE | Facility: CLINIC | Age: 72
End: 2022-11-21
Payer: MEDICARE

## 2022-11-21 PROCEDURE — 91313 COVID-19,PF,MODERNA BIVALENT: CPT

## 2022-11-21 PROCEDURE — 0134A COVID-19,PF,MODERNA BIVALENT: CPT

## 2022-11-21 PROCEDURE — 90662 IIV NO PRSV INCREASED AG IM: CPT

## 2022-11-21 PROCEDURE — G0008 ADMIN INFLUENZA VIRUS VAC: HCPCS | Mod: 59

## 2023-03-25 DIAGNOSIS — F41.9 ANXIETY: ICD-10-CM

## 2023-03-25 DIAGNOSIS — K21.9 GASTROESOPHAGEAL REFLUX DISEASE WITHOUT ESOPHAGITIS: ICD-10-CM

## 2023-03-27 RX ORDER — SERTRALINE HYDROCHLORIDE 100 MG/1
TABLET, FILM COATED ORAL
Qty: 135 TABLET | Refills: 0 | Status: SHIPPED | OUTPATIENT
Start: 2023-03-27 | End: 2023-04-05

## 2023-03-27 RX ORDER — PANTOPRAZOLE SODIUM 40 MG/1
TABLET, DELAYED RELEASE ORAL
Qty: 90 TABLET | Refills: 0 | Status: SHIPPED | OUTPATIENT
Start: 2023-03-27 | End: 2023-04-05

## 2023-03-27 RX ORDER — CLONAZEPAM 0.5 MG/1
TABLET ORAL
Qty: 30 TABLET | Refills: 0 | Status: SHIPPED | OUTPATIENT
Start: 2023-03-27 | End: 2024-04-10

## 2023-04-05 ENCOUNTER — OFFICE VISIT (OUTPATIENT)
Dept: FAMILY MEDICINE | Facility: CLINIC | Age: 73
End: 2023-04-05
Payer: MEDICARE

## 2023-04-05 VITALS
HEIGHT: 60 IN | DIASTOLIC BLOOD PRESSURE: 70 MMHG | SYSTOLIC BLOOD PRESSURE: 92 MMHG | BODY MASS INDEX: 32 KG/M2 | OXYGEN SATURATION: 95 % | TEMPERATURE: 97.3 F | RESPIRATION RATE: 20 BRPM | HEART RATE: 74 BPM | WEIGHT: 163 LBS

## 2023-04-05 DIAGNOSIS — Z13.1 SCREENING FOR DIABETES MELLITUS: ICD-10-CM

## 2023-04-05 DIAGNOSIS — Z00.00 ENCOUNTER FOR MEDICARE ANNUAL WELLNESS EXAM: Primary | ICD-10-CM

## 2023-04-05 DIAGNOSIS — M06.9 RHEUMATOID ARTHRITIS, INVOLVING UNSPECIFIED SITE, UNSPECIFIED WHETHER RHEUMATOID FACTOR PRESENT (H): ICD-10-CM

## 2023-04-05 DIAGNOSIS — Z12.11 SCREEN FOR COLON CANCER: ICD-10-CM

## 2023-04-05 DIAGNOSIS — F41.9 ANXIETY: ICD-10-CM

## 2023-04-05 DIAGNOSIS — E78.5 HYPERLIPIDEMIA LDL GOAL <130: ICD-10-CM

## 2023-04-05 DIAGNOSIS — K21.9 GASTROESOPHAGEAL REFLUX DISEASE WITHOUT ESOPHAGITIS: ICD-10-CM

## 2023-04-05 DIAGNOSIS — F32.0 MILD MAJOR DEPRESSION (H): ICD-10-CM

## 2023-04-05 DIAGNOSIS — J44.9 CHRONIC OBSTRUCTIVE PULMONARY DISEASE, UNSPECIFIED COPD TYPE (H): ICD-10-CM

## 2023-04-05 LAB
CHOLEST SERPL-MCNC: 243 MG/DL
FASTING STATUS PATIENT QL REPORTED: NO
GLUCOSE SERPL-MCNC: 92 MG/DL (ref 70–99)
HDLC SERPL-MCNC: 78 MG/DL
LDLC SERPL CALC-MCNC: 144 MG/DL
NONHDLC SERPL-MCNC: 165 MG/DL
TRIGL SERPL-MCNC: 107 MG/DL

## 2023-04-05 PROCEDURE — 99214 OFFICE O/P EST MOD 30 MIN: CPT | Mod: 25 | Performed by: NURSE PRACTITIONER

## 2023-04-05 PROCEDURE — 80061 LIPID PANEL: CPT | Performed by: NURSE PRACTITIONER

## 2023-04-05 PROCEDURE — G0438 PPPS, INITIAL VISIT: HCPCS | Performed by: NURSE PRACTITIONER

## 2023-04-05 PROCEDURE — 36415 COLL VENOUS BLD VENIPUNCTURE: CPT | Performed by: NURSE PRACTITIONER

## 2023-04-05 PROCEDURE — 82947 ASSAY GLUCOSE BLOOD QUANT: CPT | Performed by: NURSE PRACTITIONER

## 2023-04-05 RX ORDER — SERTRALINE HYDROCHLORIDE 100 MG/1
TABLET, FILM COATED ORAL
Qty: 135 TABLET | Refills: 3 | Status: SHIPPED | OUTPATIENT
Start: 2023-04-05 | End: 2024-04-10

## 2023-04-05 RX ORDER — CLONAZEPAM 0.5 MG/1
0.5 TABLET ORAL 2 TIMES DAILY PRN
Qty: 30 TABLET | Refills: 0 | Status: CANCELLED | OUTPATIENT
Start: 2023-04-05

## 2023-04-05 RX ORDER — PANTOPRAZOLE SODIUM 40 MG/1
40 TABLET, DELAYED RELEASE ORAL DAILY
Qty: 90 TABLET | Refills: 3 | Status: SHIPPED | OUTPATIENT
Start: 2023-04-05 | End: 2024-04-09

## 2023-04-05 RX ORDER — ALBUTEROL SULFATE 90 UG/1
2 AEROSOL, METERED RESPIRATORY (INHALATION) EVERY 4 HOURS PRN
Qty: 36 G | Refills: 11 | Status: SHIPPED | OUTPATIENT
Start: 2023-04-05 | End: 2024-07-11

## 2023-04-05 ASSESSMENT — PAIN SCALES - GENERAL: PAINLEVEL: MODERATE PAIN (5)

## 2023-04-05 ASSESSMENT — PATIENT HEALTH QUESTIONNAIRE - PHQ9
SUM OF ALL RESPONSES TO PHQ QUESTIONS 1-9: 5
10. IF YOU CHECKED OFF ANY PROBLEMS, HOW DIFFICULT HAVE THESE PROBLEMS MADE IT FOR YOU TO DO YOUR WORK, TAKE CARE OF THINGS AT HOME, OR GET ALONG WITH OTHER PEOPLE: NOT DIFFICULT AT ALL
SUM OF ALL RESPONSES TO PHQ QUESTIONS 1-9: 5

## 2023-04-05 NOTE — PROGRESS NOTES
She is at risk for lack of exercise and has been provided with information to increase physical activity for the benefit of her well-being.  The patient was counseled and encouraged to consider modifying their diet and eating habits. She was provided with information on recommended healthy diet options.  The patient was provided with written information regarding signs of hearing loss.  Information on urinary incontinence and treatment options given to patient.  The patient was provided with suggestions to help her develop a healthy emotional lifestyle.  The patient's PHQ-9 score is consistent with mild depression. She was provided with information regarding depression and was advised to schedule a follow up appointment in 12 weeks to further address this issue.

## 2023-04-05 NOTE — PROGRESS NOTES
Assessment & Plan     Encounter for Medicare annual wellness exam  - PRIMARY CARE FOLLOW-UP SCHEDULING; Future    Chronic obstructive pulmonary disease, unspecified COPD type (H)  Stable   - albuterol (PROAIR HFA/PROVENTIL HFA/VENTOLIN HFA) 108 (90 Base) MCG/ACT inhaler; Inhale 2 puffs into the lungs every 4 hours as needed for shortness of breath or wheezing    Rheumatoid arthritis, involving unspecified site, unspecified whether rheumatoid factor present (H)  Known issue that I take into account for their medical decisions, no current exacerbations or new concerns  Follows with rheumatology    Mild major depression (H)  Stable.    Anxiety  Stable.  - sertraline (ZOLOFT) 100 MG tablet; TAKE 1 & 1/2 (ONE & ONE-HALF) TABLETS BY MOUTH ONCE DAILY    Gastroesophageal reflux disease without esophagitis  Stable.  - pantoprazole (PROTONIX) 40 MG EC tablet; Take 1 tablet (40 mg) by mouth daily    Hyperlipidemia LDL goal <130  Due for recheck.  - Lipid panel reflex to direct LDL Non-fasting; Future  - Lipid panel reflex to direct LDL Non-fasting    Screen for colon cancer  Patient declined.    Screening for diabetes mellitus  - Glucose; Future  - Glucose      The risks, benefits and treatment options of prescribed medications or other treatments have been discussed with the patient. The patient verbalized their understanding and should call or follow up if no improvement or if they develop further problems.    ANN MARIE Pulido [facility]            Suha Black is a 72 year old, presenting for the following health issues:  Recheck Medication (Add on medicare wellness)         View : No data to display.              History of Present Illness       Reason for visit:  Medicine    She eats 2-3 servings of fruits and vegetables daily.She consumes 3 sweetened beverage(s) daily.She exercises with enough effort to increase her heart rate 9 or less minutes per day.  She exercises with  enough effort to increase her heart rate 3 or less days per week.   She is taking medications regularly.    Today's PHQ-9         PHQ-9 Total Score: 5    PHQ-9 Q9 Thoughts of better off dead/self-harm past 2 weeks :   Not at all    How difficult have these problems made it for you to do your work, take care of things at home, or get along with other people: Not difficult at all       Depression and Anxiety Follow-Up    How are you doing with your depression since your last visit? Depends on the day    How are you doing with your anxiety since your last visit?  Depends on the day    Are you having other symptoms that might be associated with depression or anxiety? No    Have you had a significant life event? Grief or Loss     Do you have any concerns with your use of alcohol or other drugs? No    Social History     Tobacco Use     Smoking status: Every Day     Packs/day: 0.50     Types: Cigarettes     Smokeless tobacco: Never     Tobacco comments:     not ready to quit/declined quit info 12/20/16   Vaping Use     Vaping status: Never Used   Substance Use Topics     Alcohol use: No     Alcohol/week: 0.0 standard drinks of alcohol     Drug use: No         4/1/2016     1:34 PM 12/12/2016     4:18 PM 4/5/2023    11:00 AM   PHQ   PHQ-9 Total Score 7 6 5   Q9: Thoughts of better off dead/self-harm past 2 weeks Not at all Not at all Not at all         12/10/2014     2:21 PM 4/1/2016     1:34 PM 12/12/2016     4:18 PM   NICOLE-7 SCORE   Total Score 12     Total Score  9 9         4/5/2023    11:00 AM   Last PHQ-9   1.  Little interest or pleasure in doing things 1   2.  Feeling down, depressed, or hopeless 1   3.  Trouble falling or staying asleep, or sleeping too much 1   4.  Feeling tired or having little energy 1   5.  Poor appetite or overeating 1   6.  Feeling bad about yourself 0   7.  Trouble concentrating 0   8.  Moving slowly or restless 0   Q9: Thoughts of better off dead/self-harm past 2 weeks 0   PHQ-9 Total Score 5  "        12/12/2016     4:18 PM   NICOLE-7    1. Feeling nervous, anxious, or on edge 2   2. Not being able to stop or control worrying 2   3. Worrying too much about different things 2   4. Trouble relaxing 0   5. Being so restless that it is hard to sit still 0   6. Becoming easily annoyed or irritable 0   7. Feeling afraid, as if something awful might happen 3   NICOLE-7 Total Score 9         Annual Wellness Visit    Patient has been advised of split billing requirements and indicates understanding: Yes     Are you in the first 12 months of your Medicare Part B coverage?  No    Physical Health:    In general, how would you rate your overall physical health? good    Outside of work, how many days during the week do you exercise?none    Outside of work, approximately how many minutes a day do you exercise?not applicable    If you drink alcohol do you typically have >3 drinks per day or >7 drinks per week? Not Applicable    Do you usually eat at least 4 servings of fruit and vegetables a day, include whole grains & fiber and avoid regularly eating high fat or \"junk\" foods? No    Do you have any problems taking medications regularly? No    Do you have any side effects from medications? none    Needs assistance for the following daily activities: no assistance needed    Which of the following safety concerns are present in your home?  none identified     Hearing impairment: Yes, Feel that people are mumbling or not speaking clearly.    In the past 6 months, have you been bothered by leaking of urine? yes    Mental Health:    In general, how would you rate your overall mental or emotional health? fair  PHQ-2 Score:  2    Do you feel safe in your environment? Yes    Have you ever done Advance Care Planning? (For example, a Health Directive, POLST, or a discussion with a medical provider or your loved ones about your wishes)? No, advance care planning information given to patient to review.  Patient plans to discuss their " wishes with loved ones or provider.      Fall risk:  Fallen 2 or more times in the past year?: No  Any fall with injury in the past year?: No    Cognitive Screenin) Repeat 3 items (Leader, Season, Table)    2) Clock draw: NORMAL  3) 3 item recall: Recalls 3 objects  Results: 3 items recalled: COGNITIVE IMPAIRMENT LESS LIKELY    Mini-CogTM Copyright KEYON Gaviria. Licensed by the author for use in [facility]; reprinted with permission (tomy@[facility]). All rights reserved.          Social History     Tobacco Use     Smoking status: Every Day     Packs/day: 0.50     Types: Cigarettes     Smokeless tobacco: Never     Tobacco comments:     not ready to quit/declined quit info 16   Vaping Use     Vaping status: Never Used   Substance Use Topics     Alcohol use: No     Alcohol/week: 0.0 standard drinks of alcohol              View : No data to display.              Do you have a current opioid prescription? No  Do you use any other controlled substances or medications that are not prescribed by a provider? None    Current providers sharing in care for this patient include:   Patient Care Team:  Zulma Harris APRN CNP as PCP - General (Nurse Practitioner)  Zulma Harris APRN CNP as Assigned PCP  Jacquelyn Jones OD as Assigned Surgical Provider    Patient has been advised of split billing requirements and indicates understanding: Yes    I have reviewed Opioid Use Disorder and Substance Use Disorder risk factors and made any needed referrals.          Hyperlipidemia Follow-Up      Are you regularly taking any medication or supplement to lower your cholesterol?   No    Are you having muscle aches or other side effects that you think could be caused by your cholesterol lowering medication?  No    COPD Follow-Up    Overall, how are your COPD symptoms since your last clinic visit?  No change    How much fatigue or shortness of breath do you have when you are walking?  Same as usual    How much  "shortness of breath do you have when you are resting?  None    How often do you cough? Rarely    Have you noticed any change in your sputum/phlegm?  No    Have you experienced a recent fever? No    Have you had any Emergency Room Visits, Urgent Care Visits, or Hospital Admissions because of your COPD since your last office visit?  No    History   Smoking Status     Every Day     Packs/day: 0.50     Types: Cigarettes   Smokeless Tobacco     Never     Comment: not ready to quit/declined quit info 12/20/16     Lab Results   Component Value Date    FEV1 93 07/19/2012    [id number] 68@ 05/11/2007       Medication Followup of pantoprazole for GERD    Taking Medication as prescribed: yes    Side Effects:  None    Medication Helping Symptoms:  yes        Wt Readings from Last 4 Encounters:   04/05/23 73.9 kg (163 lb)   05/23/22 70 kg (154 lb 4.8 oz)   12/10/21 69.4 kg (153 lb)   09/21/20 68.9 kg (151 lb 14.4 oz)         Review of Systems   Constitutional, HEENT, cardiovascular, pulmonary, GI, , musculoskeletal, neuro, skin, endocrine and psych systems are negative, except as otherwise noted.      Objective    BP 92/70 (BP Location: Right arm, Cuff Size: Adult Regular)   Pulse 74   Temp 97.3  F (36.3  C) (Tympanic)   Resp 20   Ht 1.518 m (4' 11.75\")   Wt 73.9 kg (163 lb)   SpO2 95%   BMI 32.10 kg/m    Body mass index is 32.1 kg/m .  Physical Exam   GENERAL: healthy, alert and no distress  NECK: no adenopathy, no asymmetry, masses, or scars and thyroid normal to palpation  RESP: lungs clear to auscultation - no rales, rhonchi or wheezes  CV: regular rate and rhythm, normal S1 S2, no S3 or S4, no murmur, click or rub, no peripheral edema and peripheral pulses strong  ABDOMEN: soft, nontender, no hepatosplenomegaly, no masses and bowel sounds normal  MS: no gross musculoskeletal defects noted, no edema                    "

## 2023-04-05 NOTE — PATIENT INSTRUCTIONS
Patient Education   Personalized Prevention Plan  You are due for the preventive services outlined below.  Your care team is available to assist you in scheduling these services.  If you have already completed any of these items, please share that information with your care team to update in your medical record.  Health Maintenance Due   Topic Date Due     Colorectal Cancer Screening  Never done     LUNG CANCER SCREENING  Never done     COPD Action Plan  06/03/2014     Eye Exam  11/28/2018     Talk to your care team about options to quit tobacco use.  09/02/2021     Cholesterol Lab  11/29/2022     Discuss Advance Care Planning  11/29/2022       Exercise for a Healthier Heart  You may wonder how you can improve the health of your heart. If you re thinking about exercise, you re on the right track. You don t need to become an athlete. But you do need a certain amount of brisk exercise to help strengthen your heart. If you have been diagnosed with a heart condition, your healthcare provider may advise exercise to help your condition. To help make exercise a habit, choose safe, fun activities.      Exercise with a friend. When activity is fun, you're more likely to stick with it.     Before you start  Check with your healthcare provider before starting an exercise program. This is especially important if you haven't been active for a while. It's also important if you have a long-term (chronic) health problem such as heart disease, diabetes, or obesity. Also check with your provider if you're at high risk for having these problems.   Why exercise?  Exercising regularly offers many healthy rewards. It can help you do all of these:     Improve your blood cholesterol level to help prevent further heart trouble.    Lower your blood pressure to help prevent a stroke or heart attack.    Control diabetes or reduce your risk of getting this disease.    Improve your heart and lung function.    Reach and stay at a healthy  weight.    Make your muscles stronger so you can stay active.    Prevent falls and fractures by slowing the loss of bone mass (osteoporosis).    Manage stress better.    Improve your sense of self and your body image.  Exercise tips      Ease into your routine. Set small goals. Then build on them. Talk with your healthcare provider first before starting an exercise routine if you're not sure what your activity level should be.    Exercise on most days. Aim for a total of at least 150 minutes (2 hours and 30 minutes) or more of moderate-intensity aerobic activity each week. You could also do 75 minutes (1 hour and 15 minutes) or more of vigorous-intensity aerobic activity each week. Or try for a combination of both. Moderate activity means that you breathe heavier and your heart rate increases, but you can still talk. Think about doing at least 30 minutes of moderate exercise, 5 times a week. It's OK to work up to the 30-minute period over time. Examples of moderate-intensity activity are brisk walking, gardening, and water aerobics.    Step up your daily activity level.  Along with your exercise program, try being more active the whole day. Walk instead of drive. Or park further away so that you take more steps each day. Do more household tasks or yard work. You may not be able to meet the advised amount of physical activity. But doing some moderate- or vigorous-intensity aerobic activity can help reduce your risk for heart disease. Your healthcare provider can help you figure out what is best for you.    Choose 1 or more activities you enjoy.  Walking is one of the easiest things you can do. You can also try swimming, riding a bike, dancing, or taking an exercise class.    Call 911  Call 911 right away if any of these occur:     Chest pain that doesn't go away quickly with rest    New burning, tightness, pressure, or heaviness in your chest, neck, shoulders, back, or arms    Abnormal or severe shortness of  breath    A very fast or irregular heartbeat (palpitations)    Fainting  When to call your healthcare provider  Call your healthcare provider if you have any of these:     Dizziness or lightheadedness    Mild shortness of breath or chest pain    Increased or new joint or muscle pain    [employer] last reviewed this educational content on 7/1/2022 2000-2022 [employer]. All rights reserved. This information is not intended as a substitute for professional medical care. Always follow your healthcare professional's instructions.          Understanding USDA MyPlate  The USDA has guidelines to help you make healthy food choices. These are called MyPlate. MyPlate shows the food groups that make up healthy meals using the image of a place setting. Before you eat, think about the healthiest choices for what to put on your plate or in your cup or bowl. To learn more about building a healthy plate, visit www.choosemyplate.gov.     The food groups    Fruits. Any fruit or 100% fruit juice counts as part of the Fruit Group. Fruits may be fresh, canned, frozen, or dried, and may be whole, cut-up, or pureed. Make 1/2 of your plate fruits and vegetables.    Vegetables. Any vegetable or 100% vegetable juice counts as a member of the Vegetable Group. Vegetables may be fresh, frozen, canned, or dried. They can be served raw or cooked and may be whole, cut-up, or mashed. Make 1/2 of your plate fruits and vegetables.    Grains. All foods made from grains are part of the Grains Group. These include wheat, rice, oats, cornmeal, and barley. Grains are often used to make foods such as bread, pasta, oatmeal, cereal, tortillas, and grits. Grains should be no more than 1/4 of your plate. At least half of your grains should be whole grains.    Protein. This group includes meat, poultry, seafood, beans and peas, eggs, processed soy products (such as tofu), nuts (including nut butters), and seeds. Make protein choices no more than  1/4 of your plate. Meat and poultry choices should be lean or low fat.    Dairy. The Dairy Group includes all fluid milk products and foods made from milk that contain calcium, such as yogurt and cheese. (Foods that have little calcium, such as cream, butter, and cream cheese, are not part of this group.) Most dairy choices should be low-fat or fat-free.    Oils. Oils aren't a food group, but they do contain essential nutrients. However it's important to watch your intake of oils. These are fats that are liquid at room temperature. They include canola, corn, olive, soybean, vegetable, and sunflower oil. Foods that are mainly oil include mayonnaise, certain salad dressings, and soft margarines. You likely already get your daily oil allowance from the foods you eat.  Things to limit  Eating healthy also means limiting these things in your diet:    Salt (sodium). Many processed foods have a lot of sodium. To keep sodium intake down, eat fresh vegetables, meats, poultry, and seafood when possible. Purchase low-sodium, reduced-sodium, or no-salt-added food products at the store. And don't add salt to your meals at home. Instead, season them with herbs and spices such as dill, oregano, cumin, and paprika. Or try adding flavor with lemon or lime zest and juice.    Saturated fat. Saturated fats are most often found in animal products such as beef, pork, and chicken. They are often solid at room temperature, such as butter. To reduce your saturated fat intake, choose leaner cuts of meat and poultry. And try healthier cooking methods such as grilling, broiling, roasting, or baking. For a simple lower-fat swap, use plain nonfat yogurt instead of mayonnaise when making potato salad or macaroni salad.    Added sugars. These are sugars added to foods. They are in foods such as ice cream, candy, soda, fruit drinks, sports drinks, energy drinks, cookies, pastries, jams, and syrups. Cut down on added sugars by sharing sweet treats  with a family member or friend. You can also choose fruit for dessert, and drink water or other unsweetened beverages.  [employer] last reviewed this educational content on 6/1/2020 2000-2022 [employer]. All rights reserved. This information is not intended as a substitute for professional medical care. Always follow your healthcare professional's instructions.          Signs of Hearing Loss  Hearing loss is a problem shared by many people. In fact, it's one of the most common health problems, particularly as people age. Most people aged 65 and older have some hearing loss. By age 80, almost everyone does. Hearing loss often occurs slowly over the years. So, you may not realize your hearing has gotten worse.   When sudden hearing loss occurs, it's important to contact your healthcare provider right away. Your provider will do a medical exam and a hearing exam as soon as possible. This is to help find the cause and type of your sudden hearing loss. Based on your diagnosis, your healthcare provider will discuss possible treatments.      Hearing much better with one ear can be a sign of hearing loss.     Have your hearing checked  Call your healthcare provider if you:     Have to strain to hear normal conversation    Have to watch other people s faces very carefully to follow what they re saying    Need to ask people to repeat what they ve said    Often misunderstand what people are saying    Turn the volume of the television or radio up so high that others complain    Feel that people are mumbling when they re talking to you    Find that the effort to hear leaves you feeling tired and irritated    Notice, when using the phone, that you hear better with one ear than the other  [employer] last reviewed this educational content on 6/1/2022 2000-2022 [employer]. All rights reserved. This information is not intended as a substitute for professional medical care. Always follow your healthcare  professional's instructions.          Urinary Incontinence, Female (Adult)   Urinary incontinence means loss of bladder control. This problem affects many women, especially as they get older. If you have incontinence, you may be embarrassed to ask for help. But know that this problem can be treated.   Types of Incontinence  There are different types of incontinence. Two of the main types are described here. You can have more than one type.     Stress incontinence. With this type, urine leaks when pressure (stress) is put on the bladder. This may happen when you cough, sneeze, or laugh. Stress incontinence most often occurs because the pelvic floor muscles that support the bladder and urethra are weak. This can happen after pregnancy and vaginal childbirth or a hysterectomy. It can also be due to excess body weight or hormone changes.    Urge incontinence (also called overactive bladder). With this type, a sudden urge to urinate is felt often. This may happen even though there may not be much urine in the bladder. The need to urinate often during the night is common. Urge incontinence most often occurs because of bladder spasms. This may be due to bladder irritation or infection. Damage to bladder nerves or pelvic muscles, constipation, and certain medicines can also lead to urge incontinence.  Treatment depends on the cause. Further evaluation is needed to find the type you have. This will likely include an exam and certain tests. Based on the results, you and your healthcare provider can then plan treatment. Until a diagnosis is made, the home care tips below can help ease symptoms.   Home care    Do pelvic floor muscle exercises, if they are prescribed. The pelvic floor muscles help support the bladder and urethra. Many women find that their symptoms improve when doing special exercises that strengthen these muscles. To do the exercises, contract the muscles you would use to stop your stream of urine. But do this  when you re not urinating. Hold for 10 seconds, then relax. Repeat 10 to 20 times in a row, at least 3 times a day. Your healthcare provider may give you other instructions for how to do the exercises and how often.    Keep a bladder diary. This helps track how often and how much you urinate over a set period of time. Bring this diary with you to your next visit with the provider. The information can help your provider learn more about your bladder problem.    Lose weight, if advised to by your provider. Extra weight puts pressure on the bladder. Your provider can help you create a weight-loss plan that s right for you. This may include exercising more and making certain diet changes.    Don't have foods and drinks that may irritate the bladder. These can include alcohol and caffeinated drinks.    Quit smoking. Smoking and other tobacco use can lead to a long-term (chronic) cough that strains the pelvic floor muscles. Smoking may also damage the bladder and urethra. Talk with your provider about treatments or methods you can use to quit smoking.    If drinking large amounts of fluid makes you have symptoms, you may be advised to limit your fluid intake. You may also be advised to drink most of your fluids during the day and to limit fluids at night.    If you re worried about urine leakage or accidents, you may wear absorbent pads to catch urine. Change the pads often. This helps reduce discomfort. It may also reduce the risk of skin or bladder infections.    Follow-up care  Follow up with your healthcare provider, or as directed. It may take some to find the right treatment for your problem. But healthy lifestyle changes can be made right away. These include such things as exercising on a regular basis, eating a healthy diet, losing weight (if needed), and quitting smoking. Your treatment plan may include special therapies or medicines. Certain procedures or surgery may also be options. Talk about any questions you  have with your provider.   When to seek medical advice  Call the healthcare provider right away if any of these occur:    Fever of 100.4 F (38 C) or higher, or as directed by your provider    Bladder pain or fullness    Belly swelling    Nausea or vomiting    Back pain    Weakness, dizziness, or fainting  [employer] last reviewed this educational content on 1/1/2020 2000-2022 [employer]. All rights reserved. This information is not intended as a substitute for professional medical care. Always follow your healthcare professional's instructions.        Your Health Risk Assessment indicates you feel you are not in good emotional health.    Recreation   Recreation is not limited to sports and team events. It includes any activity that provides relaxation, interest, enjoyment, and exercise. Recreation provides an outlet for physical, mental, and social energy. It can give a sense of worth and achievement. It can help you stay healthy.    Mental Exercise and Social Involvement  Mental and emotional health is as important as physical health. Keep in touch with friends and family. Stay as active as possible. Continue to learn and challenge yourself.   Things you can do to stay mentally active are:    Learn something new, like a foreign language or musical instrument.     Play SCRABBLE or do crossword puzzles. If you cannot find people to play these games with you at home, you can play them with others on your computer through the Internet.     Join a games club--anything from card games to chess or checkers or lawn bowling.     Start a new hobby.     Go back to school.     Volunteer.     Read.   Keep up with world events.    Depression and Suicide in Older Adults  Nearly 2 million older adults in the [place] have some type of depression. Some of them even take their own lives. Yet depression among older adults is often ignored. Learning about the warning signs of depression may help spare a loved one needless  "pain. You may also save a life.   What is depression?  Depression is a common and serious illness. It affects the way you think and feel. It is not a normal part of aging. It is not a sign of weakness, a character flaw, or something you can \"snap out of.\" Most people with depression need treatment to get better. The most common symptom is a feeling of deep sadness. People who are depressed also may seem tired and listless. And nothing seems to give them pleasure. It s normal to grieve or be sad sometimes. But sadness lessens or passes with time. Depression rarely goes away or improves on its own. Other symptoms of depression are:     Sleeping more or less than normal    Eating more or less than normal    Having headaches, stomachaches, or other pains that don t go away    Feeling nervous,  empty,  or worthless    Crying a lot    Thinking or talking about suicide or death    Loss of interest in activities previously enjoyed    Social isolation    Feeling confused or forgetful  What causes it?  The causes of depression aren t fully known. But it is thought to result from a complex blend of these factors:     Biochemistry. Certain chemicals in the brain play a role.    Genes. Depression does run in families.    Life stress. Life stresses can also trigger depression in some people. Older adults often face many stressors. These may include isolation, the death of friends or a spouse, health problems, and financial concerns.    Chronic health conditions. This includes diabetes, heart disease, or cancer. These can cause symptoms of depression. Medicine side effects can cause changes in thoughts and behaviors.  Giving support    Depressed people often may not want to ask for help. When they do, they may be ignored. Or they may get the wrong treatment. You can help by showing parents and older friends love and support. If they seem depressed, don t lecture the person or ignore the symptoms. Don't discount the symptoms as a "  normal  part of aging. They are not. Get involved, listen, and show interest and support.   Help them understand that depression is a treatable illness. Tell them you can help them find the right treatment. Offer to go to their healthcare provider's appointment with them for support when the symptoms are discussed. With their approval, contact a local mental health center, social service agency, or hospital about services.   Helping at healthcare visits  You can be an advocate for them at healthcare appointments. Many older adults have chronic illnesses. Many of these can cause symptoms of depression. Medicine side effects can change thoughts and behaviors.   You can help make sure that the healthcare provider looks at all of these factors. They should refer your family member or friend to a mental healthcare provider when needed. In some cases, untreated depression can lead to a misdiagnosis. A person may be diagnosed with a brain disorder such as dementia. If the healthcare provider does not take the issue of depression seriously, help your family member or friend find another provider.   Asking about self-harm thoughts  If you think an older person you care about could be suicidal, ask,  Have you thought about suicide?  Most people will tell you the truth. If they say yes, they may already have a plan for how and when they will attempt it. Find out as much as you can. The more detailed the plan, and the easier it is to carry out, the more danger the person is in right now. Tell the person you are there for them and you do not want them to get harmed. Don't wait to get help for the person. Call the person's healthcare provider, local hospital, or emergency services.   Call 988 in a crisis   Never leave the person alone. A person who is actively suicidal needs crisis care right away. They need constant supervision. Never leave the person out of sight. Call 988 Tell the crisis counselor you need help for a person  who is thinking about suicide. The counselor will help you get the right level of crisis help. You may be advised to take the person to the nearest emergency room.   The National Suicide Prevention Lifeline is available at 988, or 203-052-NJGQ (385-545-9788), or www.suicidepreventionGuestShotsline.org. When you call or text 988, you will be connected to trained counselors who are part of the National Suicide Prevention Lifeline network. An online chat option is also available. Lifeline is free and available 24/7.   To learn more    National Suicide Prevention Lifeline at www.suicidepreMetro Telworksline.org  or 255-538-LDIK (546-625-7703)    National [place] on Mental Illness at www.nazario.org  or 599-984-TNGI (152-374-7220)    Mental Health [facility] at www.nmha.org  or 285-127-6736    National [place] of Mental Health at www.nimh.nih.gov  or 529-036-4256    [employer] last reviewed this educational content on 7/1/2022 2000-2022 [employer]. All rights reserved. This information is not intended as a substitute for professional medical care. Always follow your healthcare professional's instructions.

## 2023-04-10 ENCOUNTER — VIRTUAL VISIT (OUTPATIENT)
Dept: FAMILY MEDICINE | Facility: CLINIC | Age: 73
End: 2023-04-10
Payer: MEDICARE

## 2023-04-10 DIAGNOSIS — E78.5 HYPERLIPIDEMIA LDL GOAL <100: Primary | ICD-10-CM

## 2023-04-10 PROCEDURE — 99441 PR PHYSICIAN TELEPHONE EVALUATION 5-10 MIN: CPT | Mod: 95 | Performed by: NURSE PRACTITIONER

## 2023-04-10 RX ORDER — SIMVASTATIN 20 MG
20 TABLET ORAL AT BEDTIME
Qty: 90 TABLET | Refills: 3 | Status: SHIPPED | OUTPATIENT
Start: 2023-04-10

## 2023-04-10 NOTE — PROGRESS NOTES
Sofia is a 72 year old who is being evaluated via a billable telephone visit.      What phone number would you like to be contacted at? [phone]  How would you like to obtain your AVS? Glenda    [place] Location (provider location):  On-site       Assessment & Plan     Hyperlipidemia LDL goal <100  New diagnosis.  Options discussed.  Start:  - simvastatin (ZOCOR) 20 MG tablet; Take 1 tablet (20 mg) by mouth At Bedtime  Recheck labs in 6 weeks:  - Lipid panel reflex to direct LDL Fasting; Future  - ALT; Future      The risks, benefits and treatment options of prescribed medications or other treatments have been discussed with the patient. The patient verbalized their understanding and should call or follow up if no improvement or if they develop further problems.    ANN MARIE Pulido [facility]   Sofia is a 72 year old, presenting for the following health issues:  Results (Discuss Cholesterol Lab Results )        4/10/2023     9:12 AM   Additional Questions   Roomed by Cyril NOONAN CMA   Accompanied by none     HPI     Chief Complaint   Patient presents with     Results     Discuss Cholesterol Lab Results        Component      Latest Ref Rng 4/5/2023   Cholesterol      <200 mg/dL 243 (H)    Triglycerides      <150 mg/dL 107    HDL Cholesterol      >=50 mg/dL 78    LDL Cholesterol Calculated      <=100 mg/dL 144 (H)    Non HDL Cholesterol      <130 mg/dL 165 (H)    Glucose      70 - 99 mg/dL 92    Patient Fasting > 8hrs? No         The 10-year ASCVD risk score (Stacie DK, et al., 2019) is: 11.8%    Values used to calculate the score:      Age: 72 years      Sex: Female      Is Non- : No      Diabetic: No      Tobacco smoker: Yes      Systolic Blood Pressure: 92 mmHg      Is BP treated: No      HDL Cholesterol: 78 mg/dL      Total Cholesterol: 243 mg/dL            Review of Systems   Constitutional, HEENT, cardiovascular, pulmonary,  gi and gu systems are negative, except as otherwise noted.      Objective    Vitals - Patient Reported  Pain Score: Moderate Pain (4)  Pain Loc:  (Joints, Back)        Physical Exam     PSYCH: Alert and oriented times 3; coherent speech, normal   rate and volume, able to articulate logical thoughts, able   to abstract reason, no tangential thoughts, no hallucinations   or delusions   RESP: No cough, no audible wheezing, able to talk in full sentences  Remainder of exam unable to be completed due to telephone visits                Phone call duration: 10 minutes

## 2023-06-12 ENCOUNTER — OFFICE VISIT (OUTPATIENT)
Dept: OPTOMETRY | Facility: CLINIC | Age: 73
End: 2023-06-12
Payer: MEDICARE

## 2023-06-12 DIAGNOSIS — Z01.00 ROUTINE EYE EXAM: ICD-10-CM

## 2023-06-12 DIAGNOSIS — Z01.01 VISION EXAM WITH ABNORMAL FINDINGS: Primary | ICD-10-CM

## 2023-06-12 DIAGNOSIS — H26.8 PXF (PSEUDOEXFOLIATION OF LENS CAPSULE): ICD-10-CM

## 2023-06-12 DIAGNOSIS — H52.223 REGULAR ASTIGMATISM OF BOTH EYES: ICD-10-CM

## 2023-06-12 DIAGNOSIS — H52.03 HYPEROPIA, BILATERAL: ICD-10-CM

## 2023-06-12 DIAGNOSIS — H52.4 PRESBYOPIA: ICD-10-CM

## 2023-06-12 DIAGNOSIS — H25.813 MIXED TYPE AGE-RELATED CATARACT, BOTH EYES: ICD-10-CM

## 2023-06-12 DIAGNOSIS — H04.123 TEAR FILM INSUFFICIENCY, BILATERAL: ICD-10-CM

## 2023-06-12 PROCEDURE — 92014 COMPRE OPH EXAM EST PT 1/>: CPT | Performed by: OPTOMETRIST

## 2023-06-12 PROCEDURE — 92015 DETERMINE REFRACTIVE STATE: CPT | Mod: GY | Performed by: OPTOMETRIST

## 2023-06-12 ASSESSMENT — KERATOMETRY
OS_AXISANGLE2_DEGREES: 146
OS_K1POWER_DIOPTERS: 43.00
OD_K2POWER_DIOPTERS: 42.25
OD_K1POWER_DIOPTERS: 43.00
OD_AXISANGLE2_DEGREES: 161
OS_K2POWER_DIOPTERS: 43.50

## 2023-06-12 ASSESSMENT — REFRACTION_MANIFEST
OS_AXIS: 136
OS_CYLINDER: +0.25
OS_SPHERE: +2.25
OD_AXIS: 170
OD_ADD: +2.50
OS_CYLINDER: +0.75
OS_ADD: +2.50
OD_SPHERE: +0.75
OD_CYLINDER: +1.00
OD_CYLINDER: +1.00
OS_AXIS: 145
OS_SPHERE: +1.75
OD_SPHERE: +0.75
OD_AXIS: 180

## 2023-06-12 ASSESSMENT — CONF VISUAL FIELD
OD_INFERIOR_NASAL_RESTRICTION: 0
OD_INFERIOR_TEMPORAL_RESTRICTION: 0
OS_INFERIOR_NASAL_RESTRICTION: 0
OD_SUPERIOR_TEMPORAL_RESTRICTION: 0
OS_SUPERIOR_TEMPORAL_RESTRICTION: 0
OS_SUPERIOR_NASAL_RESTRICTION: 0
OD_SUPERIOR_NASAL_RESTRICTION: 0
OS_NORMAL: 1
METHOD: COUNTING FINGERS
OS_INFERIOR_TEMPORAL_RESTRICTION: 0
OD_NORMAL: 1

## 2023-06-12 ASSESSMENT — VISUAL ACUITY
OS_PH_CC+: +2
OD_CC: 20/40
OD_CC: 20/20
CORRECTION_TYPE: GLASSES
OD_PH_CC+: -3
OD_CC+: -3
METHOD: SNELLEN - LINEAR
OD_PH_CC: 20/25
OS_CC: 20/30
OS_CC: 20/100-1
OS_PH_CC: 20/30
OS_CC+: -2

## 2023-06-12 ASSESSMENT — REFRACTION_WEARINGRX
OD_ADD: +2.00
OD_AXIS: 175
SPECS_TYPE: PAL
OD_SPHERE: +1.50
OD_CYLINDER: +0.75
OS_CYLINDER: +0.25
OS_SPHERE: +1.75
OS_AXIS: 170
OS_ADD: +2.00

## 2023-06-12 ASSESSMENT — TONOMETRY
OS_IOP_MMHG: 15
OD_IOP_MMHG: 15
IOP_METHOD: APPLANATION

## 2023-06-12 NOTE — PATIENT INSTRUCTIONS
Fill glasses prescription  Allow 2 weeks to adapt to change in glasses  Return to clinic to verify glasses and for glasses check as needed.    Monitor mild cataracts yearly   Continue use of artificial tears as needed   Return yearly to monitor for glaucoma   Return in 1 year for eye exam    Jacquelyn Jones O.D.  [facility]  [address] [place] [address] [phone]

## 2023-06-12 NOTE — LETTER
6/12/2023         RE: Sofia Shay  [address] [facility] [facility] [address] [id number]        Dear Colleague,    Thank you for referring your patient, Sofia Shay, to the [facility]. Please see a copy of my visit note below.    Chief Complaint   Patient presents with     COMPREHENSIVE EYE EXAM         Last Eye Exam: About 5 years ago, she thinks   Dilated Previously: Yes    What are you currently using to see?  Glasses, they are old and scratched        Distance Vision Acuity: Noticed gradual change in both eyes    Near Vision Acuity: Not satisfied, between the glasses that are scratched and vision changes     Eye Comfort: dry off/on  and itchy at times from dry eyes  Do you use eye drops? 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     Has blue eyes that turned sepideh      OBJECTIVE: See Ophthalmology exam    ASSESSMENT:    ICD-10-CM    1. Vision exam with abnormal findings  Z01.01       2. Hyperopia, bilateral  H52.03       3. Regular astigmatism of both eyes  H52.223       4. Presbyopia  H52.4       5. Tear film insufficiency, bilateral  H04.123       6. Routine eye exam  Z01.00       7. 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    Monitor mild cataracts yearly   Continue use of artificial tears as needed   Return yearly to monitor for glaucoma   Return in 1 year for eye exam    Jacquelyn Jones O.D.  [facility]  [address] [place]  [address]  [phone]          Again, thank you for allowing me to participate in the care of your patient.        Sincerely,        Jacquelyn Jones, OD

## 2023-06-12 NOTE — PROGRESS NOTES
Chief Complaint   Patient presents with     COMPREHENSIVE EYE EXAM         Last Eye Exam: About 5 years ago, she thinks   Dilated Previously: Yes    What are you currently using to see?  Glasses, they are old and scratched        Distance Vision Acuity: Noticed gradual change in both eyes    Near Vision Acuity: Not satisfied, between the glasses that are scratched and vision changes     Eye Comfort: dry off/on  and itchy at times from dry eyes  Do you use eye drops? 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     Has blue eyes that turned sepideh      OBJECTIVE: See Ophthalmology exam    ASSESSMENT:    ICD-10-CM    1. Vision exam with abnormal findings  Z01.01       2. Hyperopia, bilateral  H52.03       3. Regular astigmatism of both eyes  H52.223       4. Presbyopia  H52.4       5. Tear film insufficiency, bilateral  H04.123       6. Routine eye exam  Z01.00       7. 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    Monitor mild cataracts yearly   Continue use of artificial tears as needed   Return yearly to monitor for glaucoma   Return in 1 year for eye exam    Jacquelyn Jones O.D.  [facility]  [address] [place], [place] [address] [phone]

## 2023-07-02 ASSESSMENT — SLIT LAMP EXAM - LIDS
COMMENTS: 2+ MEIBOMIAN GLAND DYSFUNCTION
COMMENTS: 2+ MEIBOMIAN GLAND DYSFUNCTION

## 2023-07-02 ASSESSMENT — CUP TO DISC RATIO
OD_RATIO: 0.2
OS_RATIO: 0.2

## 2023-08-17 ENCOUNTER — MYC MEDICAL ADVICE (OUTPATIENT)
Dept: FAMILY MEDICINE | Facility: CLINIC | Age: 73
End: 2023-08-17
Payer: MEDICARE

## 2023-10-05 ENCOUNTER — TRANSFERRED RECORDS (OUTPATIENT)
Dept: HEALTH INFORMATION MANAGEMENT | Facility: CLINIC | Age: 73
End: 2023-10-05
Payer: MEDICARE

## 2023-10-24 ENCOUNTER — TRANSFERRED RECORDS (OUTPATIENT)
Dept: HEALTH INFORMATION MANAGEMENT | Facility: CLINIC | Age: 73
End: 2023-10-24
Payer: MEDICARE

## 2023-12-01 ENCOUNTER — ANCILLARY PROCEDURE (OUTPATIENT)
Dept: MAMMOGRAPHY | Facility: CLINIC | Age: 73
End: 2023-12-01
Attending: NURSE PRACTITIONER
Payer: MEDICARE

## 2023-12-01 DIAGNOSIS — Z12.31 VISIT FOR SCREENING MAMMOGRAM: ICD-10-CM

## 2023-12-01 PROCEDURE — 77067 SCR MAMMO BI INCL CAD: CPT | Mod: TC | Performed by: RADIOLOGY

## 2023-12-20 ENCOUNTER — VIRTUAL VISIT (OUTPATIENT)
Dept: FAMILY MEDICINE | Facility: CLINIC | Age: 73
End: 2023-12-20
Payer: MEDICARE

## 2023-12-20 DIAGNOSIS — J01.00 ACUTE NON-RECURRENT MAXILLARY SINUSITIS: ICD-10-CM

## 2023-12-20 DIAGNOSIS — J44.1 COPD EXACERBATION (H): Primary | ICD-10-CM

## 2023-12-20 PROCEDURE — 99441 PR PHYSICIAN TELEPHONE EVALUATION 5-10 MIN: CPT | Mod: 95 | Performed by: NURSE PRACTITIONER

## 2023-12-20 RX ORDER — PREDNISONE 20 MG/1
40 TABLET ORAL DAILY
Qty: 10 TABLET | Refills: 0 | Status: SHIPPED | OUTPATIENT
Start: 2023-12-20 | End: 2023-12-25

## 2023-12-20 RX ORDER — DOXYCYCLINE HYCLATE 100 MG
100 TABLET ORAL 2 TIMES DAILY
Qty: 20 TABLET | Refills: 0 | Status: SHIPPED | OUTPATIENT
Start: 2023-12-20 | End: 2023-12-30

## 2023-12-20 RX ORDER — CODEINE PHOSPHATE/GUAIFENESIN 10-100MG/5
5 LIQUID (ML) ORAL
Qty: 180 ML | Refills: 0 | Status: SHIPPED | OUTPATIENT
Start: 2023-12-20

## 2023-12-20 ASSESSMENT — PATIENT HEALTH QUESTIONNAIRE - PHQ9
10. IF YOU CHECKED OFF ANY PROBLEMS, HOW DIFFICULT HAVE THESE PROBLEMS MADE IT FOR YOU TO DO YOUR WORK, TAKE CARE OF THINGS AT HOME, OR GET ALONG WITH OTHER PEOPLE: NOT DIFFICULT AT ALL
SUM OF ALL RESPONSES TO PHQ QUESTIONS 1-9: 5
SUM OF ALL RESPONSES TO PHQ QUESTIONS 1-9: 5

## 2023-12-20 NOTE — PROGRESS NOTES
Sofia is a 73 year old who is being evaluated via a billable telephone visit.      What phone number would you like to be contacted at? [phone]  How would you like to obtain your AVS? Glenda    Distant Location (provider location):  On-site      Assessment & Plan     COPD exacerbation (H)  - doxycycline hyclate (VIBRA-TABS) 100 MG tablet; Take 1 tablet (100 mg) by mouth 2 times daily for 10 days  - predniSONE (DELTASONE) 20 MG tablet; Take 2 tablets (40 mg) by mouth daily for 5 days  - guaiFENesin-codeine (GUAIFENESIN AC) 100-10 MG/5ML syrup; Take 5 mLs by mouth nightly as needed for congestion or cough    Acute non-recurrent maxillary sinusitis  - doxycycline hyclate (VIBRA-TABS) 100 MG tablet; Take 1 tablet (100 mg) by mouth 2 times daily for 10 days  - predniSONE (DELTASONE) 20 MG tablet; Take 2 tablets (40 mg) by mouth daily for 5 days    Recommend treating with course of antibiotics and prednisone.  Patient was also requesting prescription for cough syrup with codeine.  Advised patient to come to urgent care for evaluation if she is not improving within 48 hours.    The risks, benefits and treatment options of prescribed medications or other treatments have been discussed with the patient. The patient verbalized their understanding and should call or follow up if no improvement or if they develop further problems.  ANN MARIE [facility]                Suha Black is a 73 year old, presenting for the following health issues:  URI      12/20/2023     1:36 PM   Additional Questions   Roomed by Cyril CANTRELL CMA   Accompanied by self       URI    History of Present Illness       Reason for visit:  URI    She eats 0-1 servings of fruits and vegetables daily.She consumes 0 sweetened beverage(s) daily.She exercises with enough effort to increase her heart rate 9 or less minutes per day.  She exercises with enough effort to increase her heart rate 3 or less days per  week.   She is taking medications regularly.       Acute Illness  Acute illness concerns:  Onset/Duration: 1.5 week   Symptoms:  Fever: No  Chills/Sweats: No  Headache (location?): YES  Sinus Pressure: YES  Conjunctivitis:  No  Ear Pain: pressure/ plugged  Rhinorrhea: YES- at beginning of onset   Congestion: YES- head   Sore Throat: No  Cough: YES-productive of yellow sputum, worsening the last 4 days. Sputum getting thicker and harder to get out.  Wheeze: YES  Decreased Appetite: No  Nausea: No  Vomiting: No  Diarrhea: No  Dysuria/Freq.: No  Dysuria or Hematuria: No  Fatigue/Achiness: YES  Sick/Strep Exposure: No  Therapies tried and outcome: OTC cold meds, antihistamine, - short term relief     Has not had any covid, flu or RSV testing for this.          Review of Systems   Constitutional, HEENT, cardiovascular, pulmonary, gi and gu systems are negative, except as otherwise noted.        Objective    Vitals - Patient Reported  Pain Score: Moderate Pain (4)  Pain Loc:  (Band and Hands)        Physical Exam   PSYCH: Alert and oriented times 3; coherent speech, normal   rate and volume, able to articulate logical thoughts, able   to abstract reason, no tangential thoughts, no hallucinations   or delusions    RESP: No cough, no audible wheezing, able to talk in full sentences  Remainder of exam unable to be completed due to telephone visits                Phone call duration: 10 minutes

## 2024-01-11 ENCOUNTER — TRANSFERRED RECORDS (OUTPATIENT)
Dept: HEALTH INFORMATION MANAGEMENT | Facility: CLINIC | Age: 74
End: 2024-01-11
Payer: MEDICARE

## 2024-03-06 ENCOUNTER — PATIENT OUTREACH (OUTPATIENT)
Dept: CARE COORDINATION | Facility: CLINIC | Age: 74
End: 2024-03-06
Payer: MEDICARE

## 2024-03-20 ENCOUNTER — PATIENT OUTREACH (OUTPATIENT)
Dept: CARE COORDINATION | Facility: CLINIC | Age: 74
End: 2024-03-20
Payer: MEDICARE

## 2024-04-09 DIAGNOSIS — F41.9 ANXIETY: ICD-10-CM

## 2024-04-09 DIAGNOSIS — K21.9 GASTROESOPHAGEAL REFLUX DISEASE WITHOUT ESOPHAGITIS: ICD-10-CM

## 2024-04-09 RX ORDER — PANTOPRAZOLE SODIUM 40 MG/1
40 TABLET, DELAYED RELEASE ORAL DAILY
Qty: 90 TABLET | Refills: 1 | Status: SHIPPED | OUTPATIENT
Start: 2024-04-09

## 2024-04-10 RX ORDER — SERTRALINE HYDROCHLORIDE 100 MG/1
TABLET, FILM COATED ORAL
Qty: 135 TABLET | Refills: 1 | Status: SHIPPED | OUTPATIENT
Start: 2024-04-10

## 2024-04-10 RX ORDER — CLONAZEPAM 0.5 MG/1
TABLET ORAL
Qty: 30 TABLET | Refills: 0 | Status: SHIPPED | OUTPATIENT
Start: 2024-04-10

## 2024-06-11 ENCOUNTER — TRANSFERRED RECORDS (OUTPATIENT)
Dept: HEALTH INFORMATION MANAGEMENT | Facility: CLINIC | Age: 74
End: 2024-06-11
Payer: MEDICARE

## 2024-06-29 ENCOUNTER — HEALTH MAINTENANCE LETTER (OUTPATIENT)
Age: 74
End: 2024-06-29

## 2024-07-11 DIAGNOSIS — J44.9 CHRONIC OBSTRUCTIVE PULMONARY DISEASE, UNSPECIFIED COPD TYPE (H): ICD-10-CM

## 2024-07-11 RX ORDER — ALBUTEROL SULFATE 90 UG/1
AEROSOL, METERED RESPIRATORY (INHALATION)
Qty: 18 G | Refills: 4 | Status: SHIPPED | OUTPATIENT
Start: 2024-07-11

## 2024-07-11 NOTE — TELEPHONE ENCOUNTER
Prescription approved per [facility] Refill Protocol     Amy Rousseau     RN MSN

## 2024-08-22 ENCOUNTER — TELEPHONE (OUTPATIENT)
Dept: FAMILY MEDICINE | Facility: CLINIC | Age: 74
End: 2024-08-22
Payer: MEDICARE

## 2024-08-22 NOTE — TELEPHONE ENCOUNTER
She noticed a rash 2 weeks ago on her chest and left arm crease, a few spots are starting on her right arm    Raised red shiny, not circular in shape but more spread out.    Feels prickly, and chest feels itchy     Apt scheduled with available provider as pt is needing her figure out her next steps with her RA infusion Todd Sanchez, RN    Triage Nurse  [facility]

## 2024-08-23 ENCOUNTER — OFFICE VISIT (OUTPATIENT)
Dept: FAMILY MEDICINE | Facility: CLINIC | Age: 74
End: 2024-08-23
Payer: MEDICARE

## 2024-08-23 VITALS
BODY MASS INDEX: 28.54 KG/M2 | HEART RATE: 93 BPM | SYSTOLIC BLOOD PRESSURE: 102 MMHG | WEIGHT: 145.4 LBS | OXYGEN SATURATION: 96 % | RESPIRATION RATE: 20 BRPM | TEMPERATURE: 97.1 F | DIASTOLIC BLOOD PRESSURE: 72 MMHG | HEIGHT: 60 IN

## 2024-08-23 DIAGNOSIS — J44.1 COPD EXACERBATION (H): ICD-10-CM

## 2024-08-23 DIAGNOSIS — M06.9 RHEUMATOID ARTHRITIS, INVOLVING UNSPECIFIED SITE, UNSPECIFIED WHETHER RHEUMATOID FACTOR PRESENT (H): ICD-10-CM

## 2024-08-23 DIAGNOSIS — F32.0 MILD MAJOR DEPRESSION (H): ICD-10-CM

## 2024-08-23 DIAGNOSIS — L30.4 INTERTRIGO: Primary | ICD-10-CM

## 2024-08-23 PROCEDURE — 99213 OFFICE O/P EST LOW 20 MIN: CPT | Performed by: STUDENT IN AN ORGANIZED HEALTH CARE EDUCATION/TRAINING PROGRAM

## 2024-08-23 RX ORDER — RESPIRATORY SYNCYTIAL VISUS VACCINE RECOMBINANT, ADJUVANTED 120MCG/0.5
KIT INTRAMUSCULAR
COMMUNITY
Start: 2023-11-19

## 2024-08-23 RX ORDER — CLINDAMYCIN PHOSPHATE 10 MG/G
GEL TOPICAL 2 TIMES DAILY
Qty: 30 G | Refills: 0 | Status: SHIPPED | OUTPATIENT
Start: 2024-08-23

## 2024-08-23 RX ORDER — NYSTATIN 100000 U/G
CREAM TOPICAL 2 TIMES DAILY
Qty: 30 G | Refills: 0 | Status: SHIPPED | OUTPATIENT
Start: 2024-08-23

## 2024-08-23 ASSESSMENT — PATIENT HEALTH QUESTIONNAIRE - PHQ9
SUM OF ALL RESPONSES TO PHQ QUESTIONS 1-9: 6
SUM OF ALL RESPONSES TO PHQ QUESTIONS 1-9: 6
10. IF YOU CHECKED OFF ANY PROBLEMS, HOW DIFFICULT HAVE THESE PROBLEMS MADE IT FOR YOU TO DO YOUR WORK, TAKE CARE OF THINGS AT HOME, OR GET ALONG WITH OTHER PEOPLE: SOMEWHAT DIFFICULT

## 2024-08-23 ASSESSMENT — PAIN SCALES - GENERAL: PAINLEVEL: NO PAIN (1)

## 2024-08-23 NOTE — PROGRESS NOTES
"  Assessment & Plan     Intertrigo/ Erythrasma  unstable  - clindamycin (CLEOCIN-T) 1 % external gel; Apply topically 2 times daily.  - nystatin (MYCOSTATIN) 820699 UNIT/GM external cream; Apply topically 2 times daily.  Follow up next week  Rheumatoid arthritis, involving unspecified site, unspecified whether rheumatoid factor present (H)    No concern today, managed by rheumatology  COPD exacerbation (H)  stable    Mild major depression (H24)  Stable              Subjective   Sofia is a 74 year old, presenting for the following health issues:  Derm Problem        8/23/2024     1:50 PM   Additional Questions   Roomed by Dinah   Accompanied by n/a         8/23/2024     1:51 PM   Patient Reported Additional Medications   Patient reports taking the following new medications Vitamin D     History of Present Illness       Reason for visit:  Rash on chest and arm   She is taking medications regularly.         Rash  Onset/Duration: 2 weeks ago started after she sweat profusely on a hot day  Description  Location: chest and arms   Character: linear, blotchy, raised, flakey, painful, burning, red  Itching: mild  Intensity:  mild  Progression of Symptoms:  worsening  Accompanying signs and symptoms:   Fever: No  Body aches or joint pain: YES  Sore throat symptoms: No  Recent cold symptoms: No  History:           Previous episodes of similar rash: None  New exposures:  None  Recent travel: No  Exposure to similar rash: No  Precipitating or alleviating factors: n/a   Therapies tried and outcome: none  Patient had a rheumatoid arthritis for which she gets infusion.  She has COPD and mild depression. She has no concern about these today.      Review of Systems  Constitutional, HEENT, cardiovascular, pulmonary, gi and gu systems are negative, except as otherwise noted.      Objective    /72   Pulse 93   Temp 97.1  F (36.2  C) (Temporal)   Resp 20   Ht 1.515 m (4' 11.65\")   Wt 66 kg (145 lb 6.4 oz)   SpO2 96%   " BMI 28.73 kg/m    Body mass index is 28.73 kg/m .  Physical Exam   GENERAL: alert and no distress  SKIN: erythematous macula shinny rash underneath breast and the left antecubital fossa            Signed Electronically by: Shona Daniels MD

## 2024-08-23 NOTE — PATIENT INSTRUCTIONS
Bassem Black,    Thank you for allowing [facility] to manage your care.        I sent your prescriptions to your pharmacy.      For your convenience, test results are released as soon as they are available  Please allow 1-2 business days for me to send you a comment about your results.  If not done so, I encourage you to login into [employer] ([email or web address] to review your results in real time.     If you have any questions or concerns, please feel free to call us at [phone].    Sincerely,    Dr. Daniels    Did you know?      You can schedule a video visit for follow-up appointments as well as future appointments for certain conditions.  Please see the below link.     [email or web address]    If you have not already done so,  I encourage you to sign up for [employer] ([email or web address]).  This will allow you to review your results, securely communicate with a provider, and schedule virtual visits as well.

## 2024-08-27 ENCOUNTER — OFFICE VISIT (OUTPATIENT)
Dept: FAMILY MEDICINE | Facility: CLINIC | Age: 74
End: 2024-08-27
Payer: MEDICARE

## 2024-08-27 VITALS
TEMPERATURE: 97.6 F | HEIGHT: 60 IN | SYSTOLIC BLOOD PRESSURE: 100 MMHG | BODY MASS INDEX: 28.27 KG/M2 | HEART RATE: 82 BPM | OXYGEN SATURATION: 100 % | WEIGHT: 144 LBS | DIASTOLIC BLOOD PRESSURE: 58 MMHG

## 2024-08-27 DIAGNOSIS — L30.4 INTERTRIGO: Primary | ICD-10-CM

## 2024-08-27 DIAGNOSIS — L08.1 ERYTHRASMA: ICD-10-CM

## 2024-08-27 PROCEDURE — 99213 OFFICE O/P EST LOW 20 MIN: CPT | Performed by: STUDENT IN AN ORGANIZED HEALTH CARE EDUCATION/TRAINING PROGRAM

## 2024-08-27 RX ORDER — ZOSTER VACCINE RECOMBINANT, ADJUVANTED 50 MCG/0.5
KIT INTRAMUSCULAR
COMMUNITY
Start: 2024-02-27

## 2024-08-27 RX ORDER — TRIAMCINOLONE ACETONIDE 1 MG/G
OINTMENT TOPICAL
COMMUNITY

## 2024-08-27 NOTE — PROGRESS NOTES
"  Assessment & Plan     Intertrigo/ Erythrasma   Improving. Continue Nystatin and Cleocin            Subjective   Sofia is a 74 year old, presenting for the following health issues:  Rash      8/27/2024     1:16 PM   Additional Questions   Roomed by Carly CABAN         8/27/2024     1:16 PM   Patient Reported Additional Medications   Patient reports taking the following new medications No new medications to add     Rash  Associated symptoms include a rash. Associated symptoms comments: Follow up, doing a tinge better. Treatments tried: ointment. The treatment provided mild relief.              Review of Systems  Constitutional, HEENT, cardiovascular, pulmonary, gi and gu systems are negative, except as otherwise noted.      Objective    /58 (BP Location: Right arm, Patient Position: Chair, Cuff Size: Adult Regular)   Pulse 82   Temp 97.6  F (36.4  C) (Tympanic)   Ht 1.511 m (4' 11.5\")   Wt 65.3 kg (144 lb)   SpO2 100%   BMI 28.60 kg/m    Body mass index is 28.6 kg/m .  Physical Exam   SKIN: macula shinny rash underneath breast and the left antecubital fossa , the erythema that was present at last visit has resolved.    Signed Electronically by: Shona Daniels MD    "

## 2024-08-27 NOTE — PATIENT INSTRUCTIONS
Bassem Black,    Thank you for allowing [facility] to manage your care.        For your convenience, test results are released as soon as they are available  Please allow 1-2 business days for me to send you a comment about your results.  If not done so, I encourage you to login into [employer] ([email or web address] to review your results in real time.     If you have any questions or concerns, please feel free to call us at [phone].    Sincerely,    Dr. Daniels    Did you know?      You can schedule a video visit for follow-up appointments as well as future appointments for certain conditions.  Please see the below link.     [email or web address]    If you have not already done so,  I encourage you to sign up for [employer] ([email or web address]).  This will allow you to review your results, securely communicate with a provider, and schedule virtual visits as well.

## 2024-09-12 ENCOUNTER — MYC MEDICAL ADVICE (OUTPATIENT)
Dept: FAMILY MEDICINE | Facility: CLINIC | Age: 74
End: 2024-09-12
Payer: MEDICARE

## 2024-10-02 ENCOUNTER — PATIENT OUTREACH (OUTPATIENT)
Dept: CARE COORDINATION | Facility: CLINIC | Age: 74
End: 2024-10-02
Payer: MEDICARE

## 2024-10-14 ENCOUNTER — OFFICE VISIT (OUTPATIENT)
Dept: OPTOMETRY | Facility: CLINIC | Age: 74
End: 2024-10-14
Payer: MEDICARE

## 2024-10-14 DIAGNOSIS — H26.8 PXF (PSEUDOEXFOLIATION OF LENS CAPSULE): ICD-10-CM

## 2024-10-14 DIAGNOSIS — H04.123 TEAR FILM INSUFFICIENCY, BILATERAL: ICD-10-CM

## 2024-10-14 DIAGNOSIS — H25.813 MIXED TYPE AGE-RELATED CATARACT, BOTH EYES: ICD-10-CM

## 2024-10-14 DIAGNOSIS — Z01.01 VISION EXAM WITH ABNORMAL FINDINGS: Primary | ICD-10-CM

## 2024-10-14 PROCEDURE — 92014 COMPRE OPH EXAM EST PT 1/>: CPT | Performed by: OPTOMETRIST

## 2024-10-14 PROCEDURE — 92015 DETERMINE REFRACTIVE STATE: CPT | Mod: GY | Performed by: OPTOMETRIST

## 2024-10-14 ASSESSMENT — REFRACTION_MANIFEST
METHOD_AUTOREFRACTION: 1
OS_AXIS: 160
OD_CYLINDER: +1.00
OS_ADD: +2.50
OD_ADD: +2.50
OD_SPHERE: -0.50
OD_AXIS: 002
OD_SPHERE: +0.00
OD_CYLINDER: +1.00
OS_SPHERE: +1.25
OS_SPHERE: +2.50
OD_AXIS: 165
OS_CYLINDER: +0.75

## 2024-10-14 ASSESSMENT — CUP TO DISC RATIO
OD_RATIO: 0.2
OS_RATIO: 0.2

## 2024-10-14 ASSESSMENT — KERATOMETRY
OS_K1POWER_DIOPTERS: 42.75
OS_AXISANGLE2_DEGREES: 138
OD_K2POWER_DIOPTERS: 42.50
OS_K2POWER_DIOPTERS: 43.00
OD_K1POWER_DIOPTERS: 43.00
OD_AXISANGLE2_DEGREES: 161

## 2024-10-14 ASSESSMENT — CONF VISUAL FIELD
OS_INFERIOR_NASAL_RESTRICTION: 0
OS_SUPERIOR_TEMPORAL_RESTRICTION: 0
OS_SUPERIOR_NASAL_RESTRICTION: 0
OS_NORMAL: 1
OD_INFERIOR_TEMPORAL_RESTRICTION: 0
OS_INFERIOR_TEMPORAL_RESTRICTION: 0
OD_NORMAL: 1
OD_INFERIOR_NASAL_RESTRICTION: 0
OD_SUPERIOR_TEMPORAL_RESTRICTION: 0
OD_SUPERIOR_NASAL_RESTRICTION: 0
METHOD: COUNTING FINGERS

## 2024-10-14 ASSESSMENT — REFRACTION_WEARINGRX
SPECS_TYPE: PAL
OD_CYLINDER: +1.00
OD_ADD: +2.50
OD_AXIS: 165
OD_SPHERE: +0.75
OS_AXIS: 150
OS_SPHERE: +2.00
OS_ADD: +2.50
OS_CYLINDER: +0.75

## 2024-10-14 ASSESSMENT — TONOMETRY
OS_IOP_MMHG: 15
IOP_METHOD: APPLANATION
OD_IOP_MMHG: 15

## 2024-10-14 ASSESSMENT — SLIT LAMP EXAM - LIDS: COMMENTS: 2+ MEIBOMIAN GLAND DYSFUNCTION

## 2024-10-14 ASSESSMENT — VISUAL ACUITY
OS_CC: 20/30
OD_CC: 20/40
CORRECTION_TYPE: GLASSES
OS_CC+: -1
OD_PH_CC: 20/25
OD_CC+: -2
OS_CC: 20/25
OD_CC: 20/40
METHOD: SNELLEN - LINEAR

## 2024-10-14 NOTE — PATIENT INSTRUCTIONS
Fill glasses prescription  Allow 2 weeks to adapt to glasses prescription .  Mild cataracts      Return in 1 year for eye exam    Jacquelyn Jones, OD  [phone]

## 2024-10-14 NOTE — PROGRESS NOTES
Chief Complaint   Patient presents with    COMPREHENSIVE EYE EXAM         Last Eye Exam: 6/12/23  Dilated Previously: Yes    What are you currently using to see?  glasses       Distance Vision Acuity: Noticed gradual change in both eyes, not sure if it's vision or glasses. The glasses  ([employer])are terrible, the left pulls,     Avoids driving at night due to less comfort with vision and road construction     -less comfortable - pay more attention   Near Vision Acuity: Not satisfied , has o use a magnifying glass to see the tiny print, like a coupon. Glasses haven't served her well, she said and she shuts her left eye at times to help with reading and writing       Eye Comfort: itchy and film or matter in the corner of her left eye at times - not removed - happens off / on   Do you use eye drops? : Yes: uses Systane or something like that as needed   Occupation or Hobbies: Retired     [employer] Optometric Assistant           Medical, surgical and family histories reviewed and updated 10/14/2024.    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 Vision exam with abnormal findings  Z01.01       2. Tear film insufficiency, bilateral  H04.123       3. 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  Mild cataracts      Return in 1 year for eye exam    Jacquelyn Jones, OD  [phone]

## 2024-10-14 NOTE — LETTER
10/14/2024      Sofia Shay  [address] [facility] [address] [facility] [address]      Dear Colleague,    Thank you for referring your patient, Sofia Shay, to the [facility]. Please see a copy of my visit note below.    Chief Complaint   Patient presents with     COMPREHENSIVE EYE EXAM         Last Eye Exam: 6/12/23  Dilated Previously: Yes    What are you currently using to see?  glasses       Distance Vision Acuity: Noticed gradual change in both eyes, not sure if it's vision or glasses. The glasses  ([address] [employer])are terrible, the left pulls,     Avoids driving at night due to less comfort with vision and road construction     -less comfortable - pay more attention   Near Vision Acuity: Not satisfied , has o use a magnifying glass to see the tiny print, like a coupon. Glasses haven't served her well, she said and she shuts her left eye at times to help with reading and writing       Eye Comfort: itchy and film or matter in the corner of her left eye at times - not removed - happens off / on   Do you use eye drops? 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       OBJECTIVE: See Ophthalmology exam    ASSESSMENT:    ICD-10-CM    1. Vision exam with abnormal findings  Z01.01       2. Tear film insufficiency, bilateral  H04.123       3. 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  Mild cataracts      Return in 1 year for eye exam    Jacquelyn Jones, OD  [phone]                    Again, thank you for allowing me to participate in the care of your patient.        Sincerely,        Jacquelyn Jones, OD

## 2024-11-19 NOTE — TELEPHONE ENCOUNTER
MyChart reply sent to patient and closing encounter.     Roxana Carbone RN  [facility]    
no wheezing/no dyspnea/no cough

## 2024-12-30 ENCOUNTER — TELEPHONE (OUTPATIENT)
Dept: OPTOMETRY | Facility: CLINIC | Age: 74
End: 2024-12-30
Payer: MEDICARE

## 2024-12-30 NOTE — TELEPHONE ENCOUNTER
Health Call Center    Phone Message    May a detailed message be left on voicemail: yes     Reason for Call: Other: Patient's daughter in law would like to confirm patient glasses RX. States the right eye is a -50 while the left eye is +25 an she'll like to confirm if that information is correct.      States it's a rare prescription so she is unsure if it was a mistake or if that reflects patients current eyes    Action Taken: Message routed to:  [facility] eye    Travel Screening: Not Applicable     Date of Service:

## 2025-01-14 ENCOUNTER — OFFICE VISIT (OUTPATIENT)
Dept: FAMILY MEDICINE | Facility: CLINIC | Age: 75
End: 2025-01-14
Payer: MEDICARE

## 2025-01-14 VITALS
RESPIRATION RATE: 20 BRPM | HEIGHT: 60 IN | BODY MASS INDEX: 27.09 KG/M2 | HEART RATE: 83 BPM | TEMPERATURE: 98.5 F | DIASTOLIC BLOOD PRESSURE: 62 MMHG | WEIGHT: 138 LBS | OXYGEN SATURATION: 94 % | SYSTOLIC BLOOD PRESSURE: 90 MMHG

## 2025-01-14 DIAGNOSIS — E78.5 HYPERLIPIDEMIA LDL GOAL <100: ICD-10-CM

## 2025-01-14 DIAGNOSIS — K21.9 GASTROESOPHAGEAL REFLUX DISEASE WITHOUT ESOPHAGITIS: ICD-10-CM

## 2025-01-14 DIAGNOSIS — J44.9 CHRONIC OBSTRUCTIVE PULMONARY DISEASE, UNSPECIFIED COPD TYPE (H): ICD-10-CM

## 2025-01-14 DIAGNOSIS — M06.9 RHEUMATOID ARTHRITIS, INVOLVING UNSPECIFIED SITE, UNSPECIFIED WHETHER RHEUMATOID FACTOR PRESENT (H): ICD-10-CM

## 2025-01-14 DIAGNOSIS — Z00.00 ENCOUNTER FOR MEDICARE ANNUAL WELLNESS EXAM: Primary | ICD-10-CM

## 2025-01-14 DIAGNOSIS — F41.9 ANXIETY: ICD-10-CM

## 2025-01-14 DIAGNOSIS — F51.01 PRIMARY INSOMNIA: ICD-10-CM

## 2025-01-14 PROBLEM — D72.829 LEUKOCYTOSIS: Status: RESOLVED | Noted: 2020-09-10 | Resolved: 2025-01-14

## 2025-01-14 LAB
CHOLEST SERPL-MCNC: 205 MG/DL
FASTING STATUS PATIENT QL REPORTED: NO
HDLC SERPL-MCNC: 67 MG/DL
LDLC SERPL CALC-MCNC: 114 MG/DL
NONHDLC SERPL-MCNC: 138 MG/DL
TRIGL SERPL-MCNC: 120 MG/DL

## 2025-01-14 PROCEDURE — 36415 COLL VENOUS BLD VENIPUNCTURE: CPT | Performed by: NURSE PRACTITIONER

## 2025-01-14 PROCEDURE — 80061 LIPID PANEL: CPT | Performed by: NURSE PRACTITIONER

## 2025-01-14 RX ORDER — PANTOPRAZOLE SODIUM 40 MG/1
40 TABLET, DELAYED RELEASE ORAL DAILY
Qty: 90 TABLET | Refills: 3 | Status: SHIPPED | OUTPATIENT
Start: 2025-01-14

## 2025-01-14 RX ORDER — TRAZODONE HYDROCHLORIDE 50 MG/1
50 TABLET, FILM COATED ORAL AT BEDTIME
Qty: 30 TABLET | Refills: 5 | Status: SHIPPED | OUTPATIENT
Start: 2025-01-14

## 2025-01-14 RX ORDER — SIMVASTATIN 20 MG
20 TABLET ORAL AT BEDTIME
Qty: 90 TABLET | Refills: 3 | Status: CANCELLED | OUTPATIENT
Start: 2025-01-14

## 2025-01-14 RX ORDER — SERTRALINE HYDROCHLORIDE 100 MG/1
TABLET, FILM COATED ORAL
Qty: 135 TABLET | Refills: 3 | Status: SHIPPED | OUTPATIENT
Start: 2025-01-14

## 2025-01-14 RX ORDER — PROPRANOLOL HYDROCHLORIDE 10 MG/1
10 TABLET ORAL 3 TIMES DAILY PRN
Qty: 30 TABLET | Refills: 2 | Status: SHIPPED | OUTPATIENT
Start: 2025-01-14

## 2025-01-14 RX ORDER — ROSUVASTATIN CALCIUM 20 MG/1
20 TABLET, COATED ORAL DAILY
Qty: 90 TABLET | Refills: 3 | Status: SHIPPED | OUTPATIENT
Start: 2025-01-14

## 2025-01-14 RX ORDER — CLONAZEPAM 0.5 MG/1
TABLET ORAL
Qty: 30 TABLET | Refills: 2 | Status: SHIPPED | OUTPATIENT
Start: 2025-01-14

## 2025-01-14 RX ORDER — ALBUTEROL SULFATE 90 UG/1
1-2 AEROSOL, METERED RESPIRATORY (INHALATION) EVERY 4 HOURS PRN
Qty: 18 G | Refills: 4 | Status: SHIPPED | OUTPATIENT
Start: 2025-01-14

## 2025-01-14 SDOH — HEALTH STABILITY: PHYSICAL HEALTH
ON AVERAGE, HOW MANY DAYS PER WEEK DO YOU ENGAGE IN MODERATE TO STRENUOUS EXERCISE (LIKE A BRISK WALK)?: PATIENT DECLINED

## 2025-01-14 ASSESSMENT — PATIENT HEALTH QUESTIONNAIRE - PHQ9
SUM OF ALL RESPONSES TO PHQ QUESTIONS 1-9: 7
10. IF YOU CHECKED OFF ANY PROBLEMS, HOW DIFFICULT HAVE THESE PROBLEMS MADE IT FOR YOU TO DO YOUR WORK, TAKE CARE OF THINGS AT HOME, OR GET ALONG WITH OTHER PEOPLE: SOMEWHAT DIFFICULT
SUM OF ALL RESPONSES TO PHQ QUESTIONS 1-9: 7
5. POOR APPETITE OR OVEREATING: SEVERAL DAYS

## 2025-01-14 ASSESSMENT — ANXIETY QUESTIONNAIRES
GAD7 TOTAL SCORE: 10
2. NOT BEING ABLE TO STOP OR CONTROL WORRYING: MORE THAN HALF THE DAYS
1. FEELING NERVOUS, ANXIOUS, OR ON EDGE: MORE THAN HALF THE DAYS
6. BECOMING EASILY ANNOYED OR IRRITABLE: NOT AT ALL
GAD7 TOTAL SCORE: 10
7. FEELING AFRAID AS IF SOMETHING AWFUL MIGHT HAPPEN: MORE THAN HALF THE DAYS
3. WORRYING TOO MUCH ABOUT DIFFERENT THINGS: NEARLY EVERY DAY
IF YOU CHECKED OFF ANY PROBLEMS ON THIS QUESTIONNAIRE, HOW DIFFICULT HAVE THESE PROBLEMS MADE IT FOR YOU TO DO YOUR WORK, TAKE CARE OF THINGS AT HOME, OR GET ALONG WITH OTHER PEOPLE: SOMEWHAT DIFFICULT
5. BEING SO RESTLESS THAT IT IS HARD TO SIT STILL: NOT AT ALL

## 2025-01-14 ASSESSMENT — PAIN SCALES - GENERAL: PAINLEVEL_OUTOF10: NO PAIN (0)

## 2025-01-14 ASSESSMENT — SOCIAL DETERMINANTS OF HEALTH (SDOH): HOW OFTEN DO YOU GET TOGETHER WITH FRIENDS OR RELATIVES?: ONCE A WEEK

## 2025-01-14 NOTE — PATIENT INSTRUCTIONS
Patient Education   Preventive Care Advice   This is general advice given by our system to help you stay healthy. However, your care team may have specific advice just for you. Please talk to your care team about your preventive care needs.  Nutrition  Eat 5 or more servings of fruits and vegetables each day.  Try wheat bread, brown rice and whole grain pasta (instead of white bread, rice, and pasta).  Get enough calcium and vitamin D. Check the label on foods and aim for 100% of the RDA (recommended daily allowance).  Lifestyle  Exercise at least 150 minutes each week  (30 minutes a day, 5 days a week).  Do muscle strengthening activities 2 days a week. These help control your weight and prevent disease.  No smoking.  Wear sunscreen to prevent skin cancer.  Have a dental exam and cleaning every 6 months.  Yearly exams  See your health care team every year to talk about:  Any changes in your health.  Any medicines your care team has prescribed.  Preventive care, family planning, and ways to prevent chronic diseases.  Shots (vaccines)   HPV shots (up to age 26), if you've never had them before.  Hepatitis B shots (up to age 59), if you've never had them before.  COVID-19 shot: Get this shot when it's due.  Flu shot: Get a flu shot every year.  Tetanus shot: Get a tetanus shot every 10 years.  Pneumococcal, hepatitis A, and RSV shots: Ask your care team if you need these based on your risk.  Shingles shot (for age 50 and up)  General health tests  Diabetes screening:  Starting at age 35, Get screened for diabetes at least every 3 years.  If you are younger than age 35, ask your care team if you should be screened for diabetes.  Cholesterol test: At age 39, start having a cholesterol test every 5 years, or more often if advised.  Bone density scan (DEXA): At age 50, ask your care team if you should have this scan for osteoporosis (brittle bones).  Hepatitis C: Get tested at least once in your life.  STIs (sexually  transmitted infections)  Before age 24: Ask your care team if you should be screened for STIs.  After age 24: Get screened for STIs if you're at risk. You are at risk for STIs (including HIV) if:  You are sexually active with more than one person.  You don't use condoms every time.  You or a partner was diagnosed with a sexually transmitted infection.  If you are at risk for HIV, ask about PrEP medicine to prevent HIV.  Get tested for HIV at least once in your life, whether you are at risk for HIV or not.  Cancer screening tests  Cervical cancer screening: If you have a cervix, begin getting regular cervical cancer screening tests starting at age 21.  Breast cancer scan (mammogram): If you've ever had breasts, begin having regular mammograms starting at age 40. This is a scan to check for breast cancer.  Colon cancer screening: It is important to start screening for colon cancer at age 45.  Have a colonoscopy test every 10 years (or more often if you're at risk) Or, ask your provider about stool tests like a FIT test every year or Cologuard test every 3 years.  To learn more about your testing options, visit:   .  For help making a decision, visit:   https://bit.ly/by70552.  Prostate cancer screening test: If you have a prostate, ask your care team if a prostate cancer screening test (PSA) at age 55 is right for you.  Lung cancer screening: If you are a current or former smoker ages 50 to 80, ask your care team if ongoing lung cancer screenings are right for you.  For informational purposes only. Not to replace the advice of your health care provider. Copyright   2023 [facility]. All rights reserved. Clinically reviewed by the [facility]. Veracyte [id number] - REV 01/24.  Preventing Falls: Care Instructions  Injuries and health problems such as trouble walking or poor eyesight can increase your risk of falling. So can some medicines. But there are things you can do to help  "prevent falls. You can exercise to get stronger. You can also arrange your home to make it safer.    Talk to your doctor about the medicines you take. Ask if any of them increase the risk of falls and whether they can be changed or stopped.   Try to exercise regularly. It can help improve your strength and balance. This can help lower your risk of falling.         Practice fall safety and prevention.   Wear low-heeled shoes that fit well and give your feet good support. Talk to your doctor if you have foot problems that make this hard.  Carry a cellphone or wear a medical alert device that you can use to call for help.  Use stepladders instead of chairs to reach high objects. Don't climb if you're at risk for falls. Ask for help, if needed.  Wear the correct eyeglasses, if you need them.        Make your home safer.   Remove rugs, cords, clutter, and furniture from walkways.  Keep your house well lit. Use night-lights in hallways and bathrooms.  Install and use sturdy handrails on stairways.  Wear nonskid footwear, even inside. Don't walk barefoot or in socks without shoes.        Be safe outside.   Use handrails, curb cuts, and ramps whenever possible.  Keep your hands free by using a shoulder bag or backpack.  Try to walk in well-lit areas. Watch out for uneven ground, changes in pavement, and debris.  Be careful in the winter. Walk on the grass or gravel when sidewalks are slippery. Use de-icer on steps and walkways. Add non-slip devices to shoes.    Put grab bars and nonskid mats in your shower or tub and near the toilet. Try to use a shower chair or bath bench when bathing.   Get into a tub or shower by putting in your weaker leg first. Get out with your strong side first. Have a phone or medical alert device in the bathroom with you.   Where can you learn more?  Go to https://www.Skyonicwise.net/patiented  Enter G117 in the search box to learn more about \"Preventing Falls: Care Instructions.\"  Current as of: " July 31, 2024  Content Version: 14.3    2024 [employer].   Care instructions adapted under license by your healthcare professional. If you have questions about a medical condition or this instruction, always ask your healthcare professional. [employer] disclaims any warranty or liability for your use of this information.    Hearing Loss: Care Instructions  Overview     Hearing loss is a sudden or slow decrease in how well you hear. It can range from slight to profound. Permanent hearing loss can occur with aging. It also can happen when you are exposed long-term to loud noise. Examples include listening to loud music, riding motorcycles, or being around other loud machines.  Hearing loss can affect your work and home life. It can make you feel lonely or depressed. You may feel that you have lost your independence. But hearing aids and other devices can help you hear better and feel connected to others.  Follow-up care is a key part of your treatment and safety. Be sure to make and go to all appointments, and call your doctor if you are having problems. It's also a good idea to know your test results and keep a list of the medicines you take.  How can you care for yourself at home?  Avoid loud noises whenever possible. This helps keep your hearing from getting worse.  Always wear hearing protection around loud noises.  Wear a hearing aid as directed.  A professional can help you pick a hearing aid that will work best for you.  You can also get hearing aids over the counter for mild to moderate hearing loss.  Have hearing tests as your doctor suggests. They can show whether your hearing has changed. Your hearing aid may need to be adjusted.  Use other devices as needed. These may include:  Telephone amplifiers and hearing aids that can connect to a television, stereo, radio, or microphone.  Devices that use lights or vibrations. These alert you to the doorbell, a ringing telephone, or a baby  "monitor.  Television closed-captioning. This shows the words at the bottom of the screen. Most new TVs can do this.  TTY (text telephone). This lets you type messages back and forth on the telephone instead of talking or listening. These devices are also called TDD. When messages are typed on the keyboard, they are sent over the phone line to a receiving TTY. The message is shown on a monitor.  Use text messaging, social media, and email if it is hard for you to communicate by telephone.  Try to learn a listening technique called speechreading. It is not lipreading. You pay attention to people's gestures, expressions, posture, and tone of voice. These clues can help you understand what a person is saying. Face the person you are talking to, and have them face you. Make sure the lighting is good. You need to see the other person's face clearly.  Think about counseling if you need help to adjust to your hearing loss.  When should you call for help?  Watch closely for changes in your health, and be sure to contact your doctor if:    You think your hearing is getting worse.     You have new symptoms, such as dizziness or nausea.   Where can you learn more?  Go to https://www.ecobee.net/patiented  Enter R798 in the search box to learn more about \"Hearing Loss: Care Instructions.\"  Current as of: September 27, 2023  Content Version: 14.3    2024 [employer].   Care instructions adapted under license by your healthcare professional. If you have questions about a medical condition or this instruction, always ask your healthcare professional. [employer] disclaims any warranty or liability for your use of this information.    Learning About Stress  What is stress?     Stress is your body's response to a hard situation. Your body can have a physical, emotional, or mental response. Stress is a fact of life for most people, and it affects everyone differently. What causes stress for you may not be " stressful for someone else.  A lot of things can cause stress. You may feel stress when you go on a job interview, take a test, or run a race. This kind of short-term stress is normal and even useful. It can help you if you need to work hard or react quickly. For example, stress can help you finish an important job on time.  Long-term stress is caused by ongoing stressful situations or events. Examples of long-term stress include long-term health problems, ongoing problems at work, or conflicts in your family. Long-term stress can harm your health.  How does stress affect your health?  When you are stressed, your body responds as though you are in danger. It makes hormones that speed up your heart, make you breathe faster, and give you a burst of energy. This is called the fight-or-flight stress response. If the stress is over quickly, your body goes back to normal and no harm is done.  But if stress happens too often or lasts too long, it can have bad effects. Long-term stress can make you more likely to get sick, and it can make symptoms of some diseases worse. If you tense up when you are stressed, you may develop neck, shoulder, or low back pain. Stress is linked to high blood pressure and heart disease.  Stress also harms your emotional health. It can make you fleming, tense, or depressed. Your relationships may suffer, and you may not do well at work or school.  What can you do to manage stress?  You can try these things to help manage stress:   Do something active. Exercise or activity can help reduce stress. Walking is a great way to get started. Even everyday activities such as housecleaning or yard work can help.  Try yoga or zita chi. These techniques combine exercise and meditation. You may need some training at first to learn them.  Do something you enjoy. For example, listen to music or go to a movie. Practice your hobby or do volunteer work.  Meditate. This can help you relax, because you are not  "worrying about what happened before or what may happen in the future.  Do guided imagery. Imagine yourself in any setting that helps you feel calm. You can use online videos, books, or a teacher to guide you.  Do breathing exercises. For example:  From a standing position, bend forward from the waist with your knees slightly bent. Let your arms dangle close to the floor.  Breathe in slowly and deeply as you return to a standing position. Roll up slowly and lift your head last.  Hold your breath for just a few seconds in the standing position.  Breathe out slowly and bend forward from the waist.  Let your feelings out. Talk, laugh, cry, and express anger when you need to. Talking with supportive friends or family, a counselor, or a guanako leader about your feelings is a healthy way to relieve stress. Avoid discussing your feelings with people who make you feel worse.  Write. It may help to write about things that are bothering you. This helps you find out how much stress you feel and what is causing it. When you know this, you can find better ways to cope.  What can you do to prevent stress?  You might try some of these things to help prevent stress:  Manage your time. This helps you find time to do the things you want and need to do.  Get enough sleep. Your body recovers from the stresses of the day while you are sleeping.  Get support. Your family, friends, and community can make a difference in how you experience stress.  Limit your news feed. Avoid or limit time on social media or news that may make you feel stressed.  Do something active. Exercise or activity can help reduce stress. Walking is a great way to get started.  Where can you learn more?  Go to https://www.Leosphere.net/patiented  Enter N032 in the search box to learn more about \"Learning About Stress.\"  Current as of: October 24, 2023  Content Version: 14.3    2024 [employer].   Care instructions adapted under license by [facility] " professional. If you have questions about a medical condition or this instruction, always ask your healthcare professional. [employer] disclaims any warranty or liability for your use of this information.    Bladder Training: Care Instructions  Your Care Instructions     Bladder training is used to treat urge incontinence and stress incontinence. Urge incontinence means that the need to urinate comes on so fast that you can't get to a toilet in time. Stress incontinence means that you leak urine because of pressure on your bladder. For example, it may happen when you laugh, cough, or lift something heavy.  Bladder training can increase how long you can wait before you have to urinate. It can also help your bladder hold more urine. And it can give you better control over the urge to urinate.  It is important to remember that bladder training takes a few weeks to a few months to make a difference. You may not see results right away, but don't give up.  Follow-up care is a key part of your treatment and safety. Be sure to make and go to all appointments, and call your doctor if you are having problems. It's also a good idea to know your test results and keep a list of the medicines you take.  How can you care for yourself at home?  Work with your doctor to come up with a bladder training program that is right for you. You may use one or more of the following methods.  Delayed urination  In the beginning, try to keep from urinating for 5 minutes after you first feel the need to go.  While you wait, take deep, slow breaths to relax. Kegel exercises can also help you delay the need to go to the bathroom.  After some practice, when you can easily wait 5 minutes to urinate, try to wait 10 minutes before you urinate.  Slowly increase the waiting period until you are able to control when you have to urinate.  Scheduled urination  Empty your bladder when you first wake up in the morning.  Schedule times throughout  "the day when you will urinate.  Start by going to the bathroom every hour, even if you don't need to go.  Slowly increase the time between trips to the bathroom.  When you have found a schedule that works well for you, keep doing it.  If you wake up during the night and have to urinate, do it. Apply your schedule to waking hours only.  Kegel exercises  These tighten and strengthen pelvic muscles, which can help you control the flow of urine. (If doing these exercises causes pain, stop doing them and talk with your doctor.) To do Kegel exercises:  Squeeze your muscles as if you were trying not to pass gas. Or squeeze your muscles as if you were stopping the flow of urine. Your belly, legs, and buttocks shouldn't move.  Hold the squeeze for 3 seconds, then relax for 5 to 10 seconds.  Start with 3 seconds, then add 1 second each week until you are able to squeeze for 10 seconds.  Repeat the exercise 10 times a session. Do 3 to 8 sessions a day.  When should you call for help?  Watch closely for changes in your health, and be sure to contact your doctor if:    Your incontinence is getting worse.     You do not get better as expected.   Where can you learn more?  Go to https://www.[employer].net/patiented  Enter V684 in the search box to learn more about \"Bladder Training: Care Instructions.\"  Current as of: April 30, 2024  Content Version: 14.3    2024 [employer].   Care instructions adapted under license by your healthcare professional. If you have questions about a medical condition or this instruction, always ask your healthcare professional. [employer] disclaims any warranty or liability for your use of this information.    Learning About Depression Screening  What is depression screening?  Depression screening is a way to see if you have depression symptoms. It may be done by a doctor or counselor. It's often part of a routine checkup. That's because your mental health is just as important " "as your physical health.  Depression is a mental health condition that affects how you feel, think, and act. You may:  Have less energy.  Lose interest in your daily activities.  Feel sad and grouchy for a long time.  Depression is very common. It affects people of all ages.  Many things can lead to depression. Some people become depressed after they have a stroke or find out they have a major illness like cancer or heart disease. The death of a loved one or a breakup may lead to depression. It can run in families. Most experts believe that a combination of inherited genes and stressful life events can cause it.  What happens during screening?  You may be asked to fill out a form about your depression symptoms. You and the doctor will discuss your answers. The doctor may ask you more questions to learn more about how you think, act, and feel.  What happens after screening?  If you have symptoms of depression, your doctor will talk to you about your options.  Doctors usually treat depression with medicines or counseling. Often, combining the two works best. Many people don't get help because they think that they'll get over the depression on their own. But people with depression may not get better unless they get treatment.  The cause of depression is not well understood. There may be many factors involved. But if you have depression, it's not your fault.  A serious symptom of depression is thinking about death or suicide. If you or someone you care about talks about this or about feeling hopeless, get help right away.  It's important to know that depression can be treated. Medicine, counseling, and self-care may help.  Where can you learn more?  Go to https://www.Zayo.net/patiented  Enter T185 in the search box to learn more about \"Learning About Depression Screening.\"  Current as of: July 31, 2024  Content Version: 14.3    2024 [facility] [employer].   Care instructions adapted under license by your " healthcare professional. If you have questions about a medical condition or this instruction, always ask your healthcare professional. [employer] disclaims any warranty or liability for your use of this information.

## 2025-01-14 NOTE — PROGRESS NOTES
Preventive Care Visit  [facility]  ANN MARIE Pulido CNP, Family Medicine  Jan 14, 2025          Assessment & Plan     Encounter for Medicare annual wellness exam    Chronic obstructive pulmonary disease, unspecified COPD type (H)  Well controlled  Patient denies need for any other medication besides albuterol.  - OFFICE/OUTPT VISIT,EST,LEVL IV  - VENTOLIN  (90 Base) MCG/ACT inhaler; Inhale 1-2 puffs into the lungs every 4 hours as needed for shortness of breath, wheezing or cough.    Rheumatoid arthritis, involving unspecified site, unspecified whether rheumatoid factor present (H)  Managed by rheumatology  Known issue that I take into account for their medical decisions, no current exacerbations or new concerns  - OFFICE/OUTPT VISIT,EST,LEVL IV    Gastroesophageal reflux disease without esophagitis  Well controlled.  - pantoprazole (PROTONIX) 40 MG EC tablet; Take 1 tablet (40 mg) by mouth daily.  - OFFICE/OUTPT VISIT,EST,LEVL IV    Anxiety  Poorly controlled.  Continue:  - sertraline (ZOLOFT) 100 MG tablet; TAKE 1 & 1/2 (ONE & ONE-HALF) TABLETS BY MOUTH ONCE DAILY  May continue prn:  - clonazePAM (KLONOPIN) 0.5 MG tablet; Take 1 tablet by mouth twice daily as needed for anxiety. Advised that she cannot take this medication if she needs to drive.  - OFFICE/OUTPT VISIT,EST,LEVL IV  Recommend propranolol if needed for anxiety due to driving:  - propranolol (INDERAL) 10 MG tablet; Take 1 tablet (10 mg) by mouth 3 times daily as needed (anxiety).    Hyperlipidemia LDL goal <100  Due for recheck  LDL is improved this year, however her ASCVD risk score is still high  Recommend starting rosuvastatin 20 mg daily  Recheck lipids and ALT in 6 weeks.  - OFFICE/OUTPT VISIT,EST,LEVL IV  - Lipid panel reflex to direct LDL Fasting; Future  - Lipid panel reflex to direct LDL Fasting    Primary insomnia  Poorly controlled.  Start trazodone - follow up if needed.  - OFFICE/OUTPT  "VISIT,EST,LEVL IV  - traZODone (DESYREL) 50 MG tablet; Take 1 tablet (50 mg) by mouth at bedtime.        BMI  Estimated body mass index is 27.18 kg/m  as calculated from the following:    Height as of this encounter: 1.518 m (4' 11.75\").    Weight as of this encounter: 62.6 kg (138 lb).       Counseling  Appropriate preventive services were addressed with this patient via screening, questionnaire, or discussion as appropriate for fall prevention, nutrition, physical activity, Tobacco-use cessation, social engagement, weight loss and cognition.  Checklist reviewing preventive services available has been given to the patient.  Reviewed patient's diet, addressing concerns and/or questions.   The patient was instructed to see the dentist every 6 months.   She is at risk for psychosocial distress and has been provided with information to reduce risk.   The patient was provided with written information regarding signs of hearing loss.   Information on urinary incontinence and treatment options given to patient.   The patient's PHQ-9 score is consistent with mild depression. She was provided with information regarding depression.       The risks, benefits and treatment options of prescribed medications or other treatments have been discussed with the patient. The patient verbalized their understanding and should call or follow up if no improvement or if they develop further problems.  Zulma Brunfelt, CNP              Subjective   Sofia is a 74 year old, presenting for the following:  Wellness Visit and Health Maintenance (Covid booster for immunocrompromised.    Enrike getting reminders from her pharmacy about the new pneumonia vaccine ([facility] doesn't carry) )        1/14/2025    10:12 AM   Additional Questions   Roomed by Jeannette EATON   Accompanied by self         1/14/2025    10:12 AM   Patient Reported Additional Medications   Patient reports taking the following new medications none           HPI    Patient reports " having trouble sleeping  For years  Seems to be worsening.  Trouble falling asleep and staying asleep  Tried melatonin without success  Has a prescription for clonazepam - but doesn't want to use it every day.        Hyperlipidemia Follow-Up    Are you regularly taking any medication or supplement to lower your cholesterol?   No- she never started taking it  Are you having muscle aches or other side effects that you think could be caused by your cholesterol lowering medication?  No    Anxiety / depression  How are you doing with your anxiety since your last visit? Worsened  Are you having other symptoms that might be associated with anxiety? Yes:  trouble sleeping  Have you had a significant life event? Grief or Loss   Are you feeling depressed? Yes:     Do you have any concerns with your use of alcohol or other drugs? No  Doesn't want to check dose of sertraline right now. Most days taking 100 mg daily, sometimes takes an extra 50 mg  Wants more clonazepam available - rarely uses but feels more anxiety lately  Anxiety increases with driving so wants to take something before getting in a car.    Social History     Tobacco Use    Smoking status: Every Day     Current packs/day: 0.50     Types: Cigarettes    Smokeless tobacco: Never    Tobacco comments:     not ready to quit/declined quit info 4/10/23   Vaping Use    Vaping status: Never Used   Substance Use Topics    Alcohol use: No     Alcohol/week: 0.0 standard drinks of alcohol    Drug use: No         4/1/2016     1:34 PM 12/12/2016     4:18 PM 1/14/2025    10:57 AM   NICOLE-7 SCORE   Total Score 9 9 10         12/20/2023     1:34 PM 8/23/2024     1:42 PM 1/14/2025     9:57 AM   PHQ   PHQ-9 Total Score 5 6 7    Q9: Thoughts of better off dead/self-harm past 2 weeks Not at all  Not at all Not at all       Patient-reported    Proxy-reported       Medication Followup of pantaprazole  Taking Medication as prescribed: yes  Side Effects:  None  Medication Helping Symptoms:   yes      COPD Follow-Up  Overall, how are your COPD symptoms since your last clinic visit?  No change  How much fatigue or shortness of breath do you have when you are walking?  Same as usual  How much shortness of breath do you have when you are resting?  Same as usual  How often do you cough? Sometimes  Have you noticed any change in your sputum/phlegm?  No  Have you experienced a recent fever? No  Please describe how far you can walk without stopping to rest:  Less than a mile  How many flights of stairs are you able to walk up without stopping?  1  Have you had any Emergency Room Visits, Urgent Care Visits, or Hospital Admissions because of your COPD since your last office visit?  No    History   Smoking Status    Every Day    Packs/day: 0.50    Types: Cigarettes   Smokeless Tobacco    Never     Lab Results   Component Value Date    FEV1 93 07/19/2012    [id number] 68@ 05/11/2007     Health Care Directive  Patient does not have a Health Care Directive: Discussed advance care planning with patient; however, patient declined at this time.      1/14/2025   General Health   How would you rate your overall physical health? Good   Feel stress (tense, anxious, or unable to sleep) Very much   (!) STRESS CONCERN      1/14/2025   Nutrition   Diet: Regular (no restrictions)         1/14/2025   Exercise   Days per week of moderate/strenous exercise Patient declined         1/14/2025   Social Factors   Frequency of gathering with friends or relatives Once a week   Worry food won't last until get money to buy more No   Food not last or not have enough money for food? No   Do you have housing? (Housing is defined as stable permanent housing and does not include staying ouside in a car, in a tent, in an abandoned building, in an overnight shelter, or couch-surfing.) Yes   Are you worried about losing your housing? Patient declined   Lack of transportation? No   Unable to get utilities (heat,electricity)? No         1/14/2025    Fall Risk   Fallen 2 or more times in the past year? No   Trouble with walking or balance? Yes   Reason Gait Speed Test Not Completed Patient declines          1/14/2025   Activities of Daily Living- Home Safety   Needs help with the following daily activites None of the above   Safety concerns in the home None of the above         1/14/2025   Dental   Dentist two times every year? (!) NO         1/14/2025   Hearing Screening   Hearing concerns? (!) I NEED TO ASK PEOPLE TO SPEAK UP OR REPEAT THEMSELVES.         1/14/2025   Driving Risk Screening   Patient/family members have concerns about driving No         1/14/2025   General Alertness/Fatigue Screening   Have you been more tired than usual lately? (!) DECLINE         1/14/2025   Urinary Incontinence Screening   Bothered by leaking urine in past 6 months Yes         1/14/2025   TB Screening   Were you born outside of the [place]? No       Today's PHQ-9 Score:       1/14/2025     9:57 AM   PHQ-9 SCORE   PHQ-9 Total Score MyChart 7 (Mild depression)   PHQ-9 Total Score 7        Patient-reported         1/14/2025   Substance Use   If I could quit smoking, I would Completely disagree   I want to quit somking, worry about health affects Neutral   Willing to make a plan to quit smoking Completely disagree   Willing to cut down before quitting Neutral   Alcohol more than 3/day or more than 7/wk Not Applicable   Do you have a current opioid prescription? No   How severe/bad is pain from 1 to 10? 4/10   Do you use any other substances recreationally? (!) DECLINE     Social History     Tobacco Use    Smoking status: Every Day     Current packs/day: 0.50     Types: Cigarettes    Smokeless tobacco: Never    Tobacco comments:     not ready to quit/declined quit info 4/10/23   Vaping Use    Vaping status: Never Used   Substance Use Topics    Alcohol use: No     Alcohol/week: 0.0 standard drinks of alcohol    Drug use: No           12/1/2023   LAST FHS-7 RESULTS   1st degree  relative breast or ovarian cancer No   Any relative bilateral breast cancer No   Any male have breast cancer No   Any ONE woman have BOTH breast AND ovarian cancer No   Any woman with breast cancer before 50yrs No   2 or more relatives with breast AND/OR ovarian cancer No   2 or more relatives with breast AND/OR bowel cancer No            ASCVD Risk   The 10-year ASCVD risk score (Stacie MENDES, et al., 2019) is: 13.4%    Values used to calculate the score:      Age: 74 years      Sex: Female      Is Non- : No      Diabetic: No      Tobacco smoker: Yes      Systolic Blood Pressure: 90 mmHg      Is BP treated: No      HDL Cholesterol: 67 mg/dL      Total Cholesterol: 205 mg/dL            Reviewed and updated as needed this visit by Provider   Tobacco  Allergies  Meds  Problems  Med Hx  Surg Hx  Fam Hx              Current providers sharing in care for this patient include:  Patient Care Team:  Zulma Harris APRN CNP as PCP - General (Nurse Practitioner)  aJcquelyn Jones OD as Assigned Surgical Provider  Jacquelyn Jones OD (Optometry)  Shona Daniels MD as Assigned PCP    The following health maintenance items are reviewed in Epic and correct as of today:  Health Maintenance   Topic Date Due    LUNG CANCER SCREENING  Never done    COPD ACTION PLAN  06/03/2014    COVID-19 Vaccine (9 - 2024-25 season) 03/11/2025    PHQ-9  07/14/2025    NICOTINE/TOBACCO CESSATION COUNSELING Q 1 YR  08/23/2025    EYE EXAM  10/14/2025    MEDICARE ANNUAL WELLNESS VISIT  01/14/2026    ANNUAL REVIEW OF HM ORDERS  01/14/2026    FALL RISK ASSESSMENT  01/14/2026    GLUCOSE  04/05/2026    DTAP/TDAP/TD IMMUNIZATION (3 - Td or Tdap) 11/29/2027    ADVANCE CARE PLANNING  04/05/2028    LIPID  01/14/2030    DEXA  01/11/2037    SPIROMETRY  Completed    HEPATITIS C SCREENING  Completed    DEPRESSION ACTION PLAN  Completed    INFLUENZA VACCINE  Completed    Pneumococcal Vaccine: 50+ Years  Completed     "ZOSTER IMMUNIZATION  Completed    RSV VACCINE  Completed    HPV IMMUNIZATION  Aged Out    MENINGITIS IMMUNIZATION  Aged Out    RSV MONOCLONAL ANTIBODY  Aged Out    MAMMO SCREENING  Discontinued         Review of Systems  Constitutional, HEENT, cardiovascular, pulmonary, GI, , musculoskeletal, neuro, skin, endocrine and psych systems are negative, except as otherwise noted.     Objective    Exam  BP 90/62 (BP Location: Right arm, Patient Position: Sitting, Cuff Size: Adult Regular)   Pulse 83   Temp 98.5  F (36.9  C) (Tympanic)   Resp 20   Ht 1.518 m (4' 11.75\")   Wt 62.6 kg (138 lb)   LMP  (LMP Unknown)   SpO2 94%   BMI 27.18 kg/m     Estimated body mass index is 27.18 kg/m  as calculated from the following:    Height as of this encounter: 1.518 m (4' 11.75\").    Weight as of this encounter: 62.6 kg (138 lb).    Physical Exam  GENERAL: alert and no distress  HENT: ear canals and TM's normal, nose and mouth without ulcers or lesions  NECK: no adenopathy, no asymmetry, masses, or scars  RESP: lungs clear to auscultation - no rales, rhonchi or wheezes  CV: regular rate and rhythm, normal S1 S2, no S3 or S4, no murmur, click or rub, no peripheral edema  ABDOMEN: soft, nontender, no hepatosplenomegaly, no masses and bowel sounds normal  MS: no gross musculoskeletal defects noted, no edema    Results for orders placed or performed in visit on 01/14/25   Lipid panel reflex to direct LDL Fasting     Status: Abnormal   Result Value Ref Range    Cholesterol 205 (H) <200 mg/dL    Triglycerides 120 <150 mg/dL    Direct Measure HDL 67 >=50 mg/dL    LDL Cholesterol Calculated 114 (H) <100 mg/dL    Non HDL Cholesterol 138 (H) <130 mg/dL    Patient Fasting > 8hrs? No     Narrative    Cholesterol  Desirable: < 200 mg/dL  Borderline High: 200 - 239 mg/dL  High: >= 240 mg/dL    Triglycerides  Normal: < 150 mg/dL  Borderline High: 150 - 199 mg/dL  High: 200-499 mg/dL  Very High: >= 500 mg/dL    Direct Measure HDL  Female: >= " 50 mg/dL   Male: >= 40 mg/dL    LDL Cholesterol  Desirable: < 100 mg/dL  Above Desirable: 100 - 129 mg/dL   Borderline High: 130 - 159 mg/dL   High:  160 - 189 mg/dL   Very High: >= 190 mg/dL    Non HDL Cholesterol  Desirable: < 130 mg/dL  Above Desirable: 130 - 159 mg/dL  Borderline High: 160 - 189 mg/dL  High: 190 - 219 mg/dL  Very High: >= 220 mg/dL              1/14/2025   Mini Cog   Clock Draw Score 2 Normal   3 Item Recall 2 objects recalled   Mini Cog Total Score 4              Signed Electronically by: ANN MARIE Pulido CNP

## 2025-01-23 ENCOUNTER — TRANSFERRED RECORDS (OUTPATIENT)
Dept: HEALTH INFORMATION MANAGEMENT | Facility: CLINIC | Age: 75
End: 2025-01-23
Payer: MEDICARE

## 2025-02-11 ENCOUNTER — ANCILLARY PROCEDURE (OUTPATIENT)
Dept: MAMMOGRAPHY | Facility: CLINIC | Age: 75
End: 2025-02-11
Attending: NURSE PRACTITIONER
Payer: MEDICARE

## 2025-02-11 DIAGNOSIS — Z12.31 VISIT FOR SCREENING MAMMOGRAM: ICD-10-CM

## 2025-02-11 PROCEDURE — 77063 BREAST TOMOSYNTHESIS BI: CPT | Mod: TC | Performed by: RADIOLOGY

## 2025-02-11 PROCEDURE — 77067 SCR MAMMO BI INCL CAD: CPT | Mod: TC | Performed by: RADIOLOGY

## 2025-04-29 ENCOUNTER — OFFICE VISIT (OUTPATIENT)
Dept: FAMILY MEDICINE | Facility: CLINIC | Age: 75
End: 2025-04-29
Payer: MEDICARE

## 2025-04-29 VITALS
SYSTOLIC BLOOD PRESSURE: 110 MMHG | TEMPERATURE: 97.9 F | HEART RATE: 80 BPM | OXYGEN SATURATION: 98 % | HEIGHT: 60 IN | DIASTOLIC BLOOD PRESSURE: 72 MMHG | WEIGHT: 139 LBS | BODY MASS INDEX: 27.29 KG/M2 | RESPIRATION RATE: 16 BRPM

## 2025-04-29 DIAGNOSIS — L24.89 IRRITANT CONTACT DERMATITIS DUE TO OTHER AGENTS: Primary | ICD-10-CM

## 2025-04-29 DIAGNOSIS — F32.0 MILD MAJOR DEPRESSION: ICD-10-CM

## 2025-04-29 DIAGNOSIS — F41.1 GAD (GENERALIZED ANXIETY DISORDER): ICD-10-CM

## 2025-04-29 PROCEDURE — 99214 OFFICE O/P EST MOD 30 MIN: CPT | Performed by: NURSE PRACTITIONER

## 2025-04-29 PROCEDURE — 3078F DIAST BP <80 MM HG: CPT | Performed by: NURSE PRACTITIONER

## 2025-04-29 PROCEDURE — 3074F SYST BP LT 130 MM HG: CPT | Performed by: NURSE PRACTITIONER

## 2025-04-29 PROCEDURE — G2211 COMPLEX E/M VISIT ADD ON: HCPCS | Performed by: NURSE PRACTITIONER

## 2025-04-29 RX ORDER — SERTRALINE HYDROCHLORIDE 25 MG/1
25 TABLET, FILM COATED ORAL DAILY
Qty: 90 TABLET | Refills: 3 | Status: SHIPPED | OUTPATIENT
Start: 2025-04-29

## 2025-04-29 RX ORDER — TRIAMCINOLONE ACETONIDE 1 MG/G
OINTMENT TOPICAL 2 TIMES DAILY
Qty: 453.6 G | Refills: 0 | Status: SHIPPED | OUTPATIENT
Start: 2025-04-29

## 2025-04-29 RX ORDER — PREDNISONE 20 MG/1
40 TABLET ORAL DAILY
Qty: 10 TABLET | Refills: 0 | Status: SHIPPED | OUTPATIENT
Start: 2025-04-29 | End: 2025-05-04

## 2025-04-29 ASSESSMENT — PATIENT HEALTH QUESTIONNAIRE - PHQ9
SUM OF ALL RESPONSES TO PHQ QUESTIONS 1-9: 19
10. IF YOU CHECKED OFF ANY PROBLEMS, HOW DIFFICULT HAVE THESE PROBLEMS MADE IT FOR YOU TO DO YOUR WORK, TAKE CARE OF THINGS AT HOME, OR GET ALONG WITH OTHER PEOPLE: VERY DIFFICULT
SUM OF ALL RESPONSES TO PHQ QUESTIONS 1-9: 19

## 2025-04-29 NOTE — PROGRESS NOTES
Assessment & Plan     Irritant contact dermatitis due to other agents  Suspect contact dermatitis from the incontinence briefs she is wearing.  Recommend topical steroid ointment and cessation of the briefs.  She is also requesting prednisone as she feels it will be difficult to put the ointment on her back.  Follow-up if no improvement in 1 week.  - triamcinolone (KENALOG) 0.1 % external ointment; Apply topically 2 times daily.  - predniSONE (DELTASONE) 20 MG tablet; Take 2 tablets (40 mg) by mouth daily for 5 days.    Mild major depression (H)  Poorly controlled.  Options discussed.  Increase sertraline to 175 mg daily.  Follow-up in 1 month.  - sertraline (ZOLOFT) 25 MG tablet; Take 1 tablet (25 mg) by mouth daily. Add to the sertraline 150 mg prescription for a total daily dose of 175 mg    NICOLE (generalized anxiety disorder)  Poorly controlled.  Options discussed.  Increase sertraline to 175 mg daily.  Follow-up in 1 month.  - sertraline (ZOLOFT) 25 MG tablet; Take 1 tablet (25 mg) by mouth daily. Add to the sertraline 150 mg prescription for a total daily dose of 175 mg    The risks, benefits and treatment options of prescribed medications or other treatments have been discussed with the patient. The patient verbalized their understanding and should call or follow up if no improvement or if they develop further problems.  Zulma Brunfelt, CNP                      Subjective   Sofia is a 74 year old, presenting for the following health issues:  Derm Problem (rash)        4/29/2025     2:19 PM   Additional Questions   Roomed by Jeannette EATON CMA   Accompanied by self         4/29/2025     2:19 PM   Patient Reported Additional Medications   Patient reports taking the following new medications none     History of Present Illness       Reason for visit:  Rash  Symptom onset:  3-7 days ago  Symptoms include:  Red , raised bumps  Symptom intensity:  Severe  Symptom progression:  Staying the same  Had these symptoms  before:  Yes  Has tried/received treatment for these symptoms:  No  What makes it worse:  Clothing  What makes it better:  No   She is taking medications regularly.          Rash  Onset/Duration: 4-5 days- just noticed it.  Not sure how long its been there  Description  Location: all over;  started on stomach- now all over.  Most noticeable in the waist area where her depends waistband sits  Character: red, raised bumps  Itching: mild  Intensity:  moderate  Progression of Symptoms:  worsening and constant  Accompanying signs and symptoms:   Fever: No  Body aches or joint pain: No  Sore throat symptoms: No  Recent cold symptoms: No  History:           Previous episodes of similar rash: None  New exposures:  None  Started taking trazodone 1/2 tablet PRN in February sometime  Recent travel: No  Exposure to similar rash: No  Precipitating or alleviating factors:   Therapies tried and outcome: none        Depression and Anxiety   How are you doing with your depression since your last visit? Worsened   How are you doing with your anxiety since your last visit?  Worsened   Are you having other symptoms that might be associated with depression or anxiety? No  Have you had a significant life event? Grief or Loss   Do you have any concerns with your use of alcohol or other drugs? No    Social History     Tobacco Use    Smoking status: Every Day     Current packs/day: 0.50     Types: Cigarettes    Smokeless tobacco: Never    Tobacco comments:     not ready to quit/declined quit info 4/10/23   Vaping Use    Vaping status: Never Used   Substance Use Topics    Alcohol use: No     Alcohol/week: 0.0 standard drinks of alcohol    Drug use: No         8/23/2024     1:42 PM 1/14/2025     9:57 AM 4/29/2025    12:52 PM   PHQ   PHQ-9 Total Score 6 7  19    Q9: Thoughts of better off dead/self-harm past 2 weeks Not at all Not at all Not at all       Patient-reported         4/1/2016     1:34 PM 12/12/2016     4:18 PM 1/14/2025    10:57 AM  "  NICOLE-7 SCORE   Total Score 9 9 10           Objective    /72 (BP Location: Right arm, Patient Position: Sitting, Cuff Size: Adult Regular)   Pulse 80   Temp 97.9  F (36.6  C) (Tympanic)   Resp 16   Ht 1.518 m (4' 11.75\")   Wt 63 kg (139 lb)   LMP  (LMP Unknown)   SpO2 98%   BMI 27.37 kg/m    Body mass index is 27.37 kg/m .  Physical Exam   GENERAL: alert and no distress  SKIN: Numerous small erythematous patches with scale, most notable in the area of her depends briefs, but also radiating up into back and upper abdomen.  PSYCH: mentation appears normal, affect normal/bright            Signed Electronically by: ANN MARIE Pulido CNP    "

## 2025-07-09 ENCOUNTER — HOSPITAL ENCOUNTER (INPATIENT)
Facility: CLINIC | Age: 75
DRG: 871 | End: 2025-07-09
Attending: EMERGENCY MEDICINE | Admitting: STUDENT IN AN ORGANIZED HEALTH CARE EDUCATION/TRAINING PROGRAM
Payer: MEDICARE

## 2025-07-09 ENCOUNTER — APPOINTMENT (OUTPATIENT)
Dept: CT IMAGING | Facility: CLINIC | Age: 75
DRG: 871 | End: 2025-07-09
Attending: EMERGENCY MEDICINE
Payer: MEDICARE

## 2025-07-09 DIAGNOSIS — A41.9 SEPSIS DUE TO URINARY TRACT INFECTION (H): ICD-10-CM

## 2025-07-09 DIAGNOSIS — N12 PYELONEPHRITIS: Primary | ICD-10-CM

## 2025-07-09 DIAGNOSIS — N39.0 SEPSIS DUE TO URINARY TRACT INFECTION (H): ICD-10-CM

## 2025-07-09 PROBLEM — K21.9 GASTROESOPHAGEAL REFLUX DISEASE WITHOUT ESOPHAGITIS: Status: ACTIVE | Noted: 2019-09-30

## 2025-07-09 PROBLEM — F41.9 ANXIETY: Status: RESOLVED | Noted: 2019-09-30 | Resolved: 2025-07-09

## 2025-07-09 LAB
ALBUMIN SERPL BCG-MCNC: 4.1 G/DL (ref 3.5–5.2)
ALBUMIN UR-MCNC: 100 MG/DL
ALP SERPL-CCNC: 64 U/L (ref 40–150)
ALT SERPL W P-5'-P-CCNC: 21 U/L (ref 0–50)
ANION GAP SERPL CALCULATED.3IONS-SCNC: 19 MMOL/L (ref 7–15)
APPEARANCE UR: ABNORMAL
AST SERPL W P-5'-P-CCNC: 27 U/L (ref 0–45)
BACTERIA #/AREA URNS HPF: ABNORMAL /HPF
BASOPHILS # BLD MANUAL: 0 10E3/UL (ref 0–0.2)
BASOPHILS NFR BLD MANUAL: 0 %
BILIRUB SERPL-MCNC: 0.7 MG/DL
BILIRUB UR QL STRIP: NEGATIVE
BUN SERPL-MCNC: 17 MG/DL (ref 8–23)
CALCIUM SERPL-MCNC: 8.5 MG/DL (ref 8.8–10.4)
CHLORIDE SERPL-SCNC: 94 MMOL/L (ref 98–107)
COLOR UR AUTO: ABNORMAL
CREAT SERPL-MCNC: 1.26 MG/DL (ref 0.51–0.95)
EGFRCR SERPLBLD CKD-EPI 2021: 44 ML/MIN/1.73M2
EOSINOPHIL # BLD MANUAL: 0 10E3/UL (ref 0–0.7)
EOSINOPHIL NFR BLD MANUAL: 0 %
ERYTHROCYTE [DISTWIDTH] IN BLOOD BY AUTOMATED COUNT: 14.2 % (ref 10–15)
EST. AVERAGE GLUCOSE BLD GHB EST-MCNC: 123 MG/DL
GLUCOSE SERPL-MCNC: 131 MG/DL (ref 70–99)
GLUCOSE UR STRIP-MCNC: NEGATIVE MG/DL
HBA1C MFR BLD: 5.9 %
HCO3 SERPL-SCNC: 18 MMOL/L (ref 22–29)
HCT VFR BLD AUTO: 40.8 % (ref 35–47)
HGB BLD-MCNC: 13.9 G/DL (ref 11.7–15.7)
HGB UR QL STRIP: ABNORMAL
KETONES UR STRIP-MCNC: NEGATIVE MG/DL
LACTATE SERPL-SCNC: 1.3 MMOL/L (ref 0.7–2)
LEUKOCYTE ESTERASE UR QL STRIP: ABNORMAL
LIPASE SERPL-CCNC: 19 U/L (ref 13–60)
LYMPHOCYTES # BLD MANUAL: 1 10E3/UL (ref 0.8–5.3)
LYMPHOCYTES NFR BLD MANUAL: 6 %
MAGNESIUM SERPL-MCNC: 1.8 MG/DL (ref 1.7–2.3)
MCH RBC QN AUTO: 30.1 PG (ref 26.5–33)
MCHC RBC AUTO-ENTMCNC: 34.1 G/DL (ref 31.5–36.5)
MCV RBC AUTO: 88 FL (ref 78–100)
MONOCYTES # BLD MANUAL: 2.6 10E3/UL (ref 0–1.3)
MONOCYTES NFR BLD MANUAL: 15 %
MUCOUS THREADS #/AREA URNS LPF: PRESENT /LPF
NEUTROPHILS # BLD MANUAL: 13.7 10E3/UL (ref 1.6–8.3)
NEUTROPHILS NFR BLD MANUAL: 79 %
NITRATE UR QL: POSITIVE
PH UR STRIP: 5.5 [PH] (ref 5–7)
PLAT MORPH BLD: NORMAL
PLATELET # BLD AUTO: 141 10E3/UL (ref 150–450)
POTASSIUM SERPL-SCNC: 3.7 MMOL/L (ref 3.4–5.3)
PROT SERPL-MCNC: 7.2 G/DL (ref 6.4–8.3)
RBC # BLD AUTO: 4.62 10E6/UL (ref 3.8–5.2)
RBC MORPH BLD: NORMAL
RBC URINE: 19 /HPF
RENAL TUB EPI: 3 /HPF (ref ?–1)
SODIUM SERPL-SCNC: 131 MMOL/L (ref 135–145)
SP GR UR STRIP: 1.01 (ref 1–1.03)
SQUAMOUS EPITHELIAL: 6 /HPF
UROBILINOGEN UR STRIP-MCNC: NORMAL MG/DL
WBC # BLD AUTO: 17.3 10E3/UL (ref 4–11)
WBC CLUMPS #/AREA URNS HPF: PRESENT /HPF
WBC URINE: >182 /HPF

## 2025-07-09 PROCEDURE — 74176 CT ABD & PELVIS W/O CONTRAST: CPT

## 2025-07-09 PROCEDURE — 96365 THER/PROPH/DIAG IV INF INIT: CPT | Performed by: EMERGENCY MEDICINE

## 2025-07-09 PROCEDURE — 99291 CRITICAL CARE FIRST HOUR: CPT | Mod: 25 | Performed by: EMERGENCY MEDICINE

## 2025-07-09 PROCEDURE — 87186 SC STD MICRODIL/AGAR DIL: CPT | Performed by: EMERGENCY MEDICINE

## 2025-07-09 PROCEDURE — 36415 COLL VENOUS BLD VENIPUNCTURE: CPT | Performed by: EMERGENCY MEDICINE

## 2025-07-09 PROCEDURE — 250N000013 HC RX MED GY IP 250 OP 250 PS 637: Performed by: EMERGENCY MEDICINE

## 2025-07-09 PROCEDURE — 258N000003 HC RX IP 258 OP 636: Performed by: EMERGENCY MEDICINE

## 2025-07-09 PROCEDURE — 99285 EMERGENCY DEPT VISIT HI MDM: CPT | Mod: 25 | Performed by: EMERGENCY MEDICINE

## 2025-07-09 PROCEDURE — 250N000011 HC RX IP 250 OP 636: Performed by: EMERGENCY MEDICINE

## 2025-07-09 PROCEDURE — 85027 COMPLETE CBC AUTOMATED: CPT | Performed by: EMERGENCY MEDICINE

## 2025-07-09 PROCEDURE — 83036 HEMOGLOBIN GLYCOSYLATED A1C: CPT

## 2025-07-09 PROCEDURE — 83605 ASSAY OF LACTIC ACID: CPT | Performed by: EMERGENCY MEDICINE

## 2025-07-09 PROCEDURE — 99223 1ST HOSP IP/OBS HIGH 75: CPT

## 2025-07-09 PROCEDURE — 87154 CUL TYP ID BLD PTHGN 6+ TRGT: CPT | Performed by: EMERGENCY MEDICINE

## 2025-07-09 PROCEDURE — 99291 CRITICAL CARE FIRST HOUR: CPT | Performed by: EMERGENCY MEDICINE

## 2025-07-09 PROCEDURE — 85007 BL SMEAR W/DIFF WBC COUNT: CPT | Performed by: EMERGENCY MEDICINE

## 2025-07-09 PROCEDURE — 81001 URINALYSIS AUTO W/SCOPE: CPT | Performed by: EMERGENCY MEDICINE

## 2025-07-09 PROCEDURE — 87040 BLOOD CULTURE FOR BACTERIA: CPT | Performed by: EMERGENCY MEDICINE

## 2025-07-09 PROCEDURE — 84155 ASSAY OF PROTEIN SERUM: CPT | Performed by: EMERGENCY MEDICINE

## 2025-07-09 PROCEDURE — 83735 ASSAY OF MAGNESIUM: CPT

## 2025-07-09 PROCEDURE — 83690 ASSAY OF LIPASE: CPT | Performed by: EMERGENCY MEDICINE

## 2025-07-09 PROCEDURE — 96361 HYDRATE IV INFUSION ADD-ON: CPT | Performed by: EMERGENCY MEDICINE

## 2025-07-09 PROCEDURE — 200N000001 HC R&B ICU

## 2025-07-09 RX ORDER — ACETAMINOPHEN 500 MG
1000 TABLET ORAL ONCE
Status: COMPLETED | OUTPATIENT
Start: 2025-07-09 | End: 2025-07-09

## 2025-07-09 RX ORDER — SODIUM CHLORIDE 9 MG/ML
INJECTION, SOLUTION INTRAVENOUS CONTINUOUS
Status: DISCONTINUED | OUTPATIENT
Start: 2025-07-09 | End: 2025-07-12

## 2025-07-09 RX ORDER — CEFTRIAXONE 1 G/1
1 INJECTION, POWDER, FOR SOLUTION INTRAMUSCULAR; INTRAVENOUS ONCE
Status: COMPLETED | OUTPATIENT
Start: 2025-07-09 | End: 2025-07-09

## 2025-07-09 RX ADMIN — CEFTRIAXONE SODIUM 1 G: 1 INJECTION, POWDER, FOR SOLUTION INTRAMUSCULAR; INTRAVENOUS at 20:32

## 2025-07-09 RX ADMIN — ACETAMINOPHEN 1000 MG: 500 TABLET, FILM COATED ORAL at 19:08

## 2025-07-09 RX ADMIN — SODIUM CHLORIDE 500 ML: 0.9 INJECTION, SOLUTION INTRAVENOUS at 21:56

## 2025-07-09 RX ADMIN — SODIUM CHLORIDE 1000 ML: 0.9 INJECTION, SOLUTION INTRAVENOUS at 19:06

## 2025-07-09 RX ADMIN — SODIUM CHLORIDE 1000 ML: 0.9 INJECTION, SOLUTION INTRAVENOUS at 20:26

## 2025-07-09 ASSESSMENT — COLUMBIA-SUICIDE SEVERITY RATING SCALE - C-SSRS
1. IN THE PAST MONTH, HAVE YOU WISHED YOU WERE DEAD OR WISHED YOU COULD GO TO SLEEP AND NOT WAKE UP?: NO
6. HAVE YOU EVER DONE ANYTHING, STARTED TO DO ANYTHING, OR PREPARED TO DO ANYTHING TO END YOUR LIFE?: NO
2. HAVE YOU ACTUALLY HAD ANY THOUGHTS OF KILLING YOURSELF IN THE PAST MONTH?: NO

## 2025-07-09 ASSESSMENT — ACTIVITIES OF DAILY LIVING (ADL)
ADLS_ACUITY_SCORE: 46

## 2025-07-09 NOTE — ED TRIAGE NOTES
Pt felt shaky yesterday and went to chiropractor.  Pt reports increased fatigue.  Pt recently had UTI with similar symptoms     Triage Assessment (Adult)       Row Name 07/09/25 5284          Triage Assessment    Airway WDL WDL        Respiratory WDL    Respiratory WDL WDL

## 2025-07-09 NOTE — ED TRIAGE NOTES
"Pt reports feeling weak, shaky, dizzy and \" little confused\"   Pt reports decreased fluid intake today   Pt walked to reception but requested wheelchair due to feeling weak.   Symptom onset 7/5/25    Writer and provider spoke with pt about treatment and diagnostic options in ED vs. UC. Pt agreed to be evaluated in the ED.       "
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
## 2025-07-10 VITALS
RESPIRATION RATE: 18 BRPM | HEIGHT: 60 IN | TEMPERATURE: 98.9 F | SYSTOLIC BLOOD PRESSURE: 89 MMHG | HEART RATE: 75 BPM | OXYGEN SATURATION: 96 % | WEIGHT: 135.58 LBS | DIASTOLIC BLOOD PRESSURE: 49 MMHG | BODY MASS INDEX: 26.62 KG/M2

## 2025-07-10 LAB
ACB COMPLEX DNA BLD POS QL NAA+NON-PROBE: NOT DETECTED
ACB COMPLEX DNA BLD POS QL NAA+NON-PROBE: NOT DETECTED
ANION GAP SERPL CALCULATED.3IONS-SCNC: 13 MMOL/L (ref 7–15)
B FRAGILIS DNA BLD POS QL NAA+NON-PROBE: NOT DETECTED
B FRAGILIS DNA BLD POS QL NAA+NON-PROBE: NOT DETECTED
BACTERIA SPEC CULT: ABNORMAL
BACTERIA SPEC CULT: NORMAL
BACTERIA UR CULT: ABNORMAL
BUN SERPL-MCNC: 14.7 MG/DL (ref 8–23)
C ALBICANS DNA BLD POS QL NAA+NON-PROBE: NOT DETECTED
C ALBICANS DNA BLD POS QL NAA+NON-PROBE: NOT DETECTED
C AURIS DNA BLD POS QL NAA+NON-PROBE: NOT DETECTED
C AURIS DNA BLD POS QL NAA+NON-PROBE: NOT DETECTED
C GATTII+NEOFOR DNA BLD POS QL NAA+N-PRB: NOT DETECTED
C GATTII+NEOFOR DNA BLD POS QL NAA+N-PRB: NOT DETECTED
C GLABRATA DNA BLD POS QL NAA+NON-PROBE: NOT DETECTED
C GLABRATA DNA BLD POS QL NAA+NON-PROBE: NOT DETECTED
C KRUSEI DNA BLD POS QL NAA+NON-PROBE: NOT DETECTED
C KRUSEI DNA BLD POS QL NAA+NON-PROBE: NOT DETECTED
C PARAP DNA BLD POS QL NAA+NON-PROBE: NOT DETECTED
C PARAP DNA BLD POS QL NAA+NON-PROBE: NOT DETECTED
C TROPICLS DNA BLD POS QL NAA+NON-PROBE: NOT DETECTED
C TROPICLS DNA BLD POS QL NAA+NON-PROBE: NOT DETECTED
CALCIUM SERPL-MCNC: 7.3 MG/DL (ref 8.8–10.4)
CHLORIDE SERPL-SCNC: 106 MMOL/L (ref 98–107)
CREAT SERPL-MCNC: 0.97 MG/DL (ref 0.51–0.95)
E CLOAC COMP DNA BLD POS NAA+NON-PROBE: NOT DETECTED
E CLOAC COMP DNA BLD POS NAA+NON-PROBE: NOT DETECTED
E COLI DNA BLD POS QL NAA+NON-PROBE: NOT DETECTED
E COLI DNA BLD POS QL NAA+NON-PROBE: NOT DETECTED
E FAECALIS DNA BLD POS QL NAA+NON-PROBE: NOT DETECTED
E FAECALIS DNA BLD POS QL NAA+NON-PROBE: NOT DETECTED
E FAECIUM DNA BLD POS QL NAA+NON-PROBE: NOT DETECTED
E FAECIUM DNA BLD POS QL NAA+NON-PROBE: NOT DETECTED
EGFRCR SERPLBLD CKD-EPI 2021: 60 ML/MIN/1.73M2
ENTEROBACTERALES DNA BLD POS NAA+N-PRB: NOT DETECTED
ENTEROBACTERALES DNA BLD POS NAA+N-PRB: NOT DETECTED
ERYTHROCYTE [DISTWIDTH] IN BLOOD BY AUTOMATED COUNT: 14.2 % (ref 10–15)
GLUCOSE BLDC GLUCOMTR-MCNC: 131 MG/DL (ref 70–99)
GLUCOSE SERPL-MCNC: 92 MG/DL (ref 70–99)
GP B STREP DNA BLD POS QL NAA+NON-PROBE: NOT DETECTED
GP B STREP DNA BLD POS QL NAA+NON-PROBE: NOT DETECTED
HAEM INFLU DNA BLD POS QL NAA+NON-PROBE: NOT DETECTED
HAEM INFLU DNA BLD POS QL NAA+NON-PROBE: NOT DETECTED
HCO3 SERPL-SCNC: 18 MMOL/L (ref 22–29)
HCT VFR BLD AUTO: 36.7 % (ref 35–47)
HGB BLD-MCNC: 12 G/DL (ref 11.7–15.7)
K OXYTOCA DNA BLD POS QL NAA+NON-PROBE: NOT DETECTED
K OXYTOCA DNA BLD POS QL NAA+NON-PROBE: NOT DETECTED
KLEBSIELLA SP DNA BLD POS QL NAA+NON-PRB: NOT DETECTED
L MONOCYTOG DNA BLD POS QL NAA+NON-PROBE: NOT DETECTED
L MONOCYTOG DNA BLD POS QL NAA+NON-PROBE: NOT DETECTED
MCH RBC QN AUTO: 29.8 PG (ref 26.5–33)
MCHC RBC AUTO-ENTMCNC: 32.7 G/DL (ref 31.5–36.5)
MCV RBC AUTO: 91 FL (ref 78–100)
N MEN DNA BLD POS QL NAA+NON-PROBE: NOT DETECTED
N MEN DNA BLD POS QL NAA+NON-PROBE: NOT DETECTED
P AERUGINOSA DNA BLD POS NAA+NON-PROBE: NOT DETECTED
P AERUGINOSA DNA BLD POS NAA+NON-PROBE: NOT DETECTED
PLATELET # BLD AUTO: 122 10E3/UL (ref 150–450)
POTASSIUM SERPL-SCNC: 3.4 MMOL/L (ref 3.4–5.3)
PROTEUS SP DNA BLD POS QL NAA+NON-PROBE: NOT DETECTED
PROTEUS SP DNA BLD POS QL NAA+NON-PROBE: NOT DETECTED
RBC # BLD AUTO: 4.03 10E6/UL (ref 3.8–5.2)
S AUREUS DNA BLD POS QL NAA+NON-PROBE: NOT DETECTED
S AUREUS DNA BLD POS QL NAA+NON-PROBE: NOT DETECTED
S AUREUS+CONS DNA BLD POS NAA+NON-PROBE: DETECTED
S AUREUS+CONS DNA BLD POS NAA+NON-PROBE: NOT DETECTED
S EPIDERMIDIS DNA BLD POS QL NAA+NON-PRB: NOT DETECTED
S EPIDERMIDIS DNA BLD POS QL NAA+NON-PRB: NOT DETECTED
S LUGDUNENSIS DNA BLD POS QL NAA+NON-PRB: NOT DETECTED
S LUGDUNENSIS DNA BLD POS QL NAA+NON-PRB: NOT DETECTED
S MALTOPHILIA DNA BLD POS QL NAA+NON-PRB: NOT DETECTED
S MALTOPHILIA DNA BLD POS QL NAA+NON-PRB: NOT DETECTED
S MARCESCENS DNA BLD POS NAA+NON-PROBE: NOT DETECTED
S MARCESCENS DNA BLD POS NAA+NON-PROBE: NOT DETECTED
S PNEUM DNA BLD POS QL NAA+NON-PROBE: NOT DETECTED
S PNEUM DNA BLD POS QL NAA+NON-PROBE: NOT DETECTED
S PYO DNA BLD POS QL NAA+NON-PROBE: NOT DETECTED
S PYO DNA BLD POS QL NAA+NON-PROBE: NOT DETECTED
SALMONELLA DNA BLD POS QL NAA+NON-PROBE: NOT DETECTED
SALMONELLA DNA BLD POS QL NAA+NON-PROBE: NOT DETECTED
SODIUM SERPL-SCNC: 137 MMOL/L (ref 135–145)
STREPTOCOCCUS DNA BLD POS NAA+NON-PROBE: NOT DETECTED
STREPTOCOCCUS DNA BLD POS NAA+NON-PROBE: NOT DETECTED
WBC # BLD AUTO: 12.4 10E3/UL (ref 4–11)

## 2025-07-10 PROCEDURE — 120N000001 HC R&B MED SURG/OB

## 2025-07-10 PROCEDURE — 250N000013 HC RX MED GY IP 250 OP 250 PS 637: Performed by: STUDENT IN AN ORGANIZED HEALTH CARE EDUCATION/TRAINING PROGRAM

## 2025-07-10 PROCEDURE — 250N000013 HC RX MED GY IP 250 OP 250 PS 637

## 2025-07-10 PROCEDURE — 258N000003 HC RX IP 258 OP 636: Performed by: INTERNAL MEDICINE

## 2025-07-10 PROCEDURE — 85018 HEMOGLOBIN: CPT

## 2025-07-10 PROCEDURE — 99232 SBSQ HOSP IP/OBS MODERATE 35: CPT | Performed by: INTERNAL MEDICINE

## 2025-07-10 PROCEDURE — 36415 COLL VENOUS BLD VENIPUNCTURE: CPT

## 2025-07-10 PROCEDURE — 250N000011 HC RX IP 250 OP 636

## 2025-07-10 PROCEDURE — 80048 BASIC METABOLIC PNL TOTAL CA: CPT

## 2025-07-10 PROCEDURE — 258N000003 HC RX IP 258 OP 636

## 2025-07-10 RX ORDER — ALBUTEROL SULFATE 90 UG/1
1-2 INHALANT RESPIRATORY (INHALATION) EVERY 4 HOURS PRN
Status: DISCONTINUED | OUTPATIENT
Start: 2025-07-10 | End: 2025-07-12 | Stop reason: HOSPADM

## 2025-07-10 RX ORDER — AMOXICILLIN 250 MG
2 CAPSULE ORAL 2 TIMES DAILY PRN
Status: DISCONTINUED | OUTPATIENT
Start: 2025-07-10 | End: 2025-07-12 | Stop reason: HOSPADM

## 2025-07-10 RX ORDER — ACETAMINOPHEN 650 MG/20.3ML
650 LIQUID ORAL EVERY 4 HOURS PRN
Status: DISCONTINUED | OUTPATIENT
Start: 2025-07-10 | End: 2025-07-12 | Stop reason: HOSPADM

## 2025-07-10 RX ORDER — NALOXONE HYDROCHLORIDE 0.4 MG/ML
0.4 INJECTION, SOLUTION INTRAMUSCULAR; INTRAVENOUS; SUBCUTANEOUS
Status: DISCONTINUED | OUTPATIENT
Start: 2025-07-10 | End: 2025-07-12 | Stop reason: HOSPADM

## 2025-07-10 RX ORDER — DEXTROSE MONOHYDRATE 25 G/50ML
25-50 INJECTION, SOLUTION INTRAVENOUS
Status: DISCONTINUED | OUTPATIENT
Start: 2025-07-10 | End: 2025-07-10

## 2025-07-10 RX ORDER — CEFEPIME HYDROCHLORIDE 2 G/1
2 INJECTION, POWDER, FOR SOLUTION INTRAVENOUS EVERY 12 HOURS
Status: DISCONTINUED | OUTPATIENT
Start: 2025-07-10 | End: 2025-07-11

## 2025-07-10 RX ORDER — NALOXONE HYDROCHLORIDE 0.4 MG/ML
0.2 INJECTION, SOLUTION INTRAMUSCULAR; INTRAVENOUS; SUBCUTANEOUS
Status: DISCONTINUED | OUTPATIENT
Start: 2025-07-10 | End: 2025-07-12 | Stop reason: HOSPADM

## 2025-07-10 RX ORDER — AMOXICILLIN 250 MG
1 CAPSULE ORAL 2 TIMES DAILY PRN
Status: DISCONTINUED | OUTPATIENT
Start: 2025-07-10 | End: 2025-07-12 | Stop reason: HOSPADM

## 2025-07-10 RX ORDER — ROPIVACAINE IN 0.9% SOD CHL/PF 0.1 %
.01-.2 PLASTIC BAG, INJECTION (ML) EPIDURAL CONTINUOUS
Status: DISCONTINUED | OUTPATIENT
Start: 2025-07-10 | End: 2025-07-10

## 2025-07-10 RX ORDER — ONDANSETRON 4 MG/1
4 TABLET, ORALLY DISINTEGRATING ORAL EVERY 6 HOURS PRN
Status: DISCONTINUED | OUTPATIENT
Start: 2025-07-10 | End: 2025-07-12 | Stop reason: HOSPADM

## 2025-07-10 RX ORDER — NICOTINE POLACRILEX 4 MG
15-30 LOZENGE BUCCAL
Status: DISCONTINUED | OUTPATIENT
Start: 2025-07-10 | End: 2025-07-10

## 2025-07-10 RX ORDER — CLONAZEPAM 0.5 MG/1
0.5 TABLET ORAL 2 TIMES DAILY PRN
Status: DISCONTINUED | OUTPATIENT
Start: 2025-07-10 | End: 2025-07-12 | Stop reason: HOSPADM

## 2025-07-10 RX ORDER — HYDROMORPHONE HCL IN WATER/PF 6 MG/30 ML
0.2 PATIENT CONTROLLED ANALGESIA SYRINGE INTRAVENOUS EVERY 4 HOURS PRN
Status: DISCONTINUED | OUTPATIENT
Start: 2025-07-10 | End: 2025-07-12 | Stop reason: HOSPADM

## 2025-07-10 RX ORDER — ENOXAPARIN SODIUM 100 MG/ML
40 INJECTION SUBCUTANEOUS EVERY 24 HOURS
Status: DISCONTINUED | OUTPATIENT
Start: 2025-07-10 | End: 2025-07-10

## 2025-07-10 RX ORDER — SODIUM CHLORIDE 9 MG/ML
INJECTION, SOLUTION INTRAVENOUS CONTINUOUS
Status: DISCONTINUED | OUTPATIENT
Start: 2025-07-10 | End: 2025-07-10

## 2025-07-10 RX ORDER — PANTOPRAZOLE SODIUM 40 MG/1
40 TABLET, DELAYED RELEASE ORAL DAILY
Status: DISCONTINUED | OUTPATIENT
Start: 2025-07-10 | End: 2025-07-12 | Stop reason: HOSPADM

## 2025-07-10 RX ORDER — ROSUVASTATIN CALCIUM 20 MG/1
20 TABLET, COATED ORAL DAILY
Status: DISCONTINUED | OUTPATIENT
Start: 2025-07-10 | End: 2025-07-12 | Stop reason: HOSPADM

## 2025-07-10 RX ORDER — ONDANSETRON 2 MG/ML
4 INJECTION INTRAMUSCULAR; INTRAVENOUS EVERY 6 HOURS PRN
Status: DISCONTINUED | OUTPATIENT
Start: 2025-07-10 | End: 2025-07-12 | Stop reason: HOSPADM

## 2025-07-10 RX ORDER — ACETAMINOPHEN 325 MG/1
650 TABLET ORAL EVERY 4 HOURS PRN
Status: DISCONTINUED | OUTPATIENT
Start: 2025-07-10 | End: 2025-07-12 | Stop reason: HOSPADM

## 2025-07-10 RX ORDER — ONDANSETRON 2 MG/ML
4 INJECTION INTRAMUSCULAR; INTRAVENOUS EVERY 6 HOURS PRN
Status: DISCONTINUED | OUTPATIENT
Start: 2025-07-10 | End: 2025-07-10

## 2025-07-10 RX ORDER — LEUCOVORIN CALCIUM 5 MG/1
5 TABLET ORAL WEEKLY
Status: DISCONTINUED | OUTPATIENT
Start: 2025-07-10 | End: 2025-07-12 | Stop reason: HOSPADM

## 2025-07-10 RX ORDER — HYDROMORPHONE HCL IN WATER/PF 6 MG/30 ML
0.2 PATIENT CONTROLLED ANALGESIA SYRINGE INTRAVENOUS
Status: DISCONTINUED | OUTPATIENT
Start: 2025-07-10 | End: 2025-07-10

## 2025-07-10 RX ORDER — ONDANSETRON 4 MG/1
4 TABLET, ORALLY DISINTEGRATING ORAL EVERY 6 HOURS PRN
Status: DISCONTINUED | OUTPATIENT
Start: 2025-07-10 | End: 2025-07-10

## 2025-07-10 RX ORDER — CALCIUM CARBONATE 500 MG/1
1000 TABLET, CHEWABLE ORAL 4 TIMES DAILY PRN
Status: DISCONTINUED | OUTPATIENT
Start: 2025-07-10 | End: 2025-07-12 | Stop reason: HOSPADM

## 2025-07-10 RX ORDER — PROCHLORPERAZINE MALEATE 5 MG/1
5 TABLET ORAL EVERY 6 HOURS PRN
Status: DISCONTINUED | OUTPATIENT
Start: 2025-07-10 | End: 2025-07-12 | Stop reason: HOSPADM

## 2025-07-10 RX ADMIN — ROSUVASTATIN CALCIUM 20 MG: 20 TABLET, FILM COATED ORAL at 08:04

## 2025-07-10 RX ADMIN — SERTRALINE HYDROCHLORIDE 175 MG: 50 TABLET ORAL at 02:04

## 2025-07-10 RX ADMIN — PANTOPRAZOLE SODIUM 40 MG: 40 TABLET, DELAYED RELEASE ORAL at 08:04

## 2025-07-10 RX ADMIN — ACETAMINOPHEN 650 MG: 325 TABLET ORAL at 04:35

## 2025-07-10 RX ADMIN — SODIUM CHLORIDE, POTASSIUM CHLORIDE, SODIUM LACTATE AND CALCIUM CHLORIDE 1000 ML: 600; 310; 30; 20 INJECTION, SOLUTION INTRAVENOUS at 16:37

## 2025-07-10 RX ADMIN — ENOXAPARIN SODIUM 40 MG: 40 INJECTION SUBCUTANEOUS at 01:14

## 2025-07-10 RX ADMIN — CEFEPIME 2 G: 2 INJECTION, POWDER, FOR SOLUTION INTRAVENOUS at 01:15

## 2025-07-10 RX ADMIN — CEFEPIME 2 G: 2 INJECTION, POWDER, FOR SOLUTION INTRAVENOUS at 12:26

## 2025-07-10 RX ADMIN — SODIUM CHLORIDE: 0.9 INJECTION, SOLUTION INTRAVENOUS at 05:10

## 2025-07-10 RX ADMIN — ACETAMINOPHEN 650 MG: 325 TABLET ORAL at 17:41

## 2025-07-10 ASSESSMENT — ACTIVITIES OF DAILY LIVING (ADL)
ADLS_ACUITY_SCORE: 27
CONCENTRATING,_REMEMBERING_OR_MAKING_DECISIONS_DIFFICULTY: YES
ADLS_ACUITY_SCORE: 31
ADLS_ACUITY_SCORE: 26
ADLS_ACUITY_SCORE: 46
ADLS_ACUITY_SCORE: 27
ADLS_ACUITY_SCORE: 31
TOILETING: 0-->INDEPENDENT
ADLS_ACUITY_SCORE: 27
ADLS_ACUITY_SCORE: 27
ADLS_ACUITY_SCORE: 31
ADLS_ACUITY_SCORE: 27
ADLS_ACUITY_SCORE: 27
VISION_MANAGEMENT: GLASSES
ADLS_ACUITY_SCORE: 27
HEARING_DIFFICULTY_OR_DEAF: NO
DRESSING/BATHING_DIFFICULTY: NO
ADLS_ACUITY_SCORE: 31
CHANGE_IN_FUNCTIONAL_STATUS_SINCE_ONSET_OF_CURRENT_ILLNESS/INJURY: NO
ADLS_ACUITY_SCORE: 27
WALKING_OR_CLIMBING_STAIRS_DIFFICULTY: NO
FALL_HISTORY_WITHIN_LAST_SIX_MONTHS: NO
DIFFICULTY_COMMUNICATING: NO
DOING_ERRANDS_INDEPENDENTLY_DIFFICULTY: NO
WEAR_GLASSES_OR_BLIND: YES
TOILETING: 0-->INDEPENDENT
DIFFICULTY_EATING/SWALLOWING: NO
ADLS_ACUITY_SCORE: 27
TOILETING_ISSUES: YES
ADLS_ACUITY_SCORE: 31
ADLS_ACUITY_SCORE: 27

## 2025-07-10 NOTE — PROGRESS NOTES
[facility] TRANSPORT NOTE  Data:   Reason for Transport:  ICU to [facility]    Sofia Shay was transported to [facility] via wheel chair at 1635.  Patient was accompanied by NST. Equipment used for transport: None. Family was aware of reason for transport: yes    Action:  Report: received from ICU RN    Response:  Patient's condition upon return was Stable.    Jelly Bennett, RN

## 2025-07-10 NOTE — PROGRESS NOTES
"      End Of Shift Note     Plan: [id number] IV q12hrs, Blood & Urine cultures pending, Continuous NS running @ 75cc/hr. Levophed if BP meets paramaters (map 65).      Neuro: A&Ox4, some confusion for past 4 days as mentioned by Pt.      Cardiac:  Sinus rhythm, Continuous cardiac monitoring, BP MAP 77@ 0600. Map is Staying above goal of 65. Denies chest pain,     Resp: Lung sounds wheezy, has intermittent \"smoker's cough\", on room air.      GI/: hx of bladder incontinence, wears briefs. X1 incontinence. Pure wick placed. 150cc yellow output in canister.  Abdominal CT shows cystitis.     Endo: .      MSK: standby assist out of bed, steady gait, strong arms and legs.      Skin: skin intact.     LDAs: 2 PIV to right arm. Lower right forearm has NS continuous @75cc/hr.            "
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
## 2025-07-10 NOTE — PLAN OF CARE
[id number]  Problem: Adult Inpatient Plan of Care  Goal: Plan of Care Review  Outcome: Progressing  Goal: Absence of Hospital-Acquired Illness or Injury  Outcome: Progressing  Intervention: Identify and Manage Fall Risk  Recent Flowsheet Documentation  Taken 7/10/2025 1610 by Jelly Bennett RN  Safety Promotion/Fall Prevention:   clutter free environment maintained   lighting adjusted   nonskid shoes/slippers when out of bed   patient and family education   supervised activity   treat reversible contributory factors   treat underlying cause  Intervention: Prevent Skin Injury  Recent Flowsheet Documentation  Taken 7/10/2025 1610 by Jelly Bennett RN  Body Position: position changed independently  Goal: Optimal Comfort and Wellbeing  Outcome: Progressing  Intervention: Provide Person-Centered Care  Recent Flowsheet Documentation  Taken 7/10/2025 1610 by Jelly Bennett RN  Trust Relationship/Rapport:   choices provided   care explained   questions answered   questions encouraged   reassurance provided   thoughts/feelings acknowledged  Goal: Readiness for Transition of Care  Outcome: Progressing     Problem: Delirium  Goal: Optimal Coping  Outcome: Progressing  Intervention: Optimize Psychosocial Adjustment to Delirium  Recent Flowsheet Documentation  Taken 7/10/2025 1610 by Jelly Bennett RN  Supportive Measures:   active listening utilized   self-care encouraged  Goal: Improved Behavioral Control  Outcome: Progressing  Intervention: Prevent and Manage Agitation  Recent Flowsheet Documentation  Taken 7/10/2025 1610 by Jelly Bennett RN  Environment Familiarity/Consistency: daily routine followed  Intervention: Minimize Safety Risk  Recent Flowsheet Documentation  Taken 7/10/2025 1610 by Jelly Bennett RN  Enhanced Safety Measures: review medications for side effects with activity  Trust Relationship/Rapport:   choices provided   care explained   questions answered   questions  "encouraged   reassurance provided   thoughts/feelings acknowledged  Goal: Improved Attention and Thought Clarity  Outcome: Progressing  Intervention: Maximize Cognitive Function  Recent Flowsheet Documentation  Taken 7/10/2025 1610 by Jelly Bennett RN  Sensory Stimulation Regulation: care clustered  Reorientation Measures:   calendar in view   clock in view  Goal: Improved Sleep  Outcome: Progressing     Problem: Risk for Delirium  Goal: Optimal Coping  Outcome: Progressing  Intervention: Optimize Psychosocial Adjustment to Delirium  Recent Flowsheet Documentation  Taken 7/10/2025 1610 by Jelly Bennett RN  Supportive Measures:   active listening utilized   self-care encouraged  Goal: Improved Behavioral Control  Outcome: Progressing  Intervention: Prevent and Manage Agitation  Recent Flowsheet Documentation  Taken 7/10/2025 1610 by Jelly Bennett RN  Environment Familiarity/Consistency: daily routine followed  Intervention: Minimize Safety Risk  Recent Flowsheet Documentation  Taken 7/10/2025 1610 by Jelly Bennett RN  Enhanced Safety Measures: review medications for side effects with activity  Trust Relationship/Rapport:   choices provided   care explained   questions answered   questions encouraged   reassurance provided   thoughts/feelings acknowledged  Goal: Improved Attention and Thought Clarity  Outcome: Progressing  Intervention: Maximize Cognitive Function  Recent Flowsheet Documentation  Taken 7/10/2025 1610 by Jelly Bennett RN  Sensory Stimulation Regulation: care clustered  Reorientation Measures:   calendar in view   clock in view  Goal: Improved Sleep  Outcome: Progressing   Goal Outcome Evaluation:  BP (!) 84/59 (BP Location: Left arm)   Pulse 98   Temp 99.8  F (37.7  C) (Oral)   Resp 18   Ht 1.518 m (4' 11.75\")   Wt 61.5 kg (135 lb 9.3 oz)   LMP  (LMP Unknown)   SpO2 96%   BMI 26.70 kg/m    Temp improved. IV fluid bolus initiated for hypotension. Tylenol " given for mild head ache.

## 2025-07-10 NOTE — PROGRESS NOTES
[place] [facility] [place]    Patient admitted to room 09 at approximately 0050 via cart from emergency room. Patient was accompanied by nurse.     Verbal SBAR report received from Leola MIKE prior to patient arrival.     Patient ambulated to bed independently. Patient alert and oriented X 4. The patient is not having any pain.  . Admission vital signs: Blood pressure (!) 99/39, pulse 80, temperature 98.1  F (36.7  C), temperature source Oral, resp. rate 21, height 1.518 m (4' 11.75\"), weight 61.5 kg (135 lb 9.3 oz), SpO2 92%, not currently breastfeeding. Patient was oriented to plan of care, call light, bed controls, tv, telephone, bathroom, and visiting hours.     Risk Assessment    The following safety risks were identified during admission: skin. Yellow risk band applied: YES.     Skin Initial Assessment    This writer admitted this patient and completed a full skin assessment and Venancio score in the Adult PCS flowsheet.   Photo documentation of skin problem and/or wound competed via [employer] application (located under Media):  N/A    Appropriate interventions initiated as needed.     Secondary skin check completed by Lexie MIKE.    Venancio Risk Assessment  Sensory Perception: 4-->no impairment  Moisture: 4-->rarely moist  Activity: 4-->walks frequently  Mobility: 4-->no limitation  Nutrition: 3-->adequate  Friction and Shear: 3-->no apparent problem  Venancio Score: 22  Mattress: Low air loss (ANGELIQUE pump, Isolibrium, Skin Guard, Pulsate, Isoair, etc.)  Bed Frame: Standard width and length    Education    Patient has a [place] to Observation order: No  Observation education completed and documented: N/A      Rodolfo Ariza RN      "

## 2025-07-10 NOTE — PLAN OF CARE
"End Of Shift Note    Situation: Pt admitted d/t UTI, sepsis with hypotension.     Plan: antibiotics, IV fluids for dehydration.     Subjective/Objective:    Neuro: A/O x3, family notices some intermittent confusion, this writer has not noted this.     Cardiac: Intermittently hypotensive to 89 systolic but asymptomatic. SR on monitor.     Resp: congested cough d/t \"smoking for years\". LS coarse.     GI/: Purewick with good output. Hx of retaining urine. Denies frequency, hesitancy, but does endorse urgency.     Endo: BS wnl    MSK: generalized weakness     Skin: WNL    LDAs: PIV x2  Infusing NS 75 ml/hr.            Goal Outcome Evaluation:      Plan of Care Reviewed With: patient    Overall Patient Progress: improvingOverall Patient Progress: improving    Outcome Evaluation: Pt has been in bed using PureWick external catheter for urinating. 800 ml output this shift. Temp 100.3 this afternoon with some c/o chills.Occas hypotensive in 80's asymptomatic.           "

## 2025-07-10 NOTE — PLAN OF CARE
Problem: Adult Inpatient Plan of Care  Goal: Plan of Care Review  Description: The Plan of Care Review/Shift note should be completed every shift.  The Outcome Evaluation is a brief statement about your assessment that the patient is improving, declining, or no change.  This information will be displayed automatically on your shift  note.  Outcome: Progressing  Flowsheets (Taken 7/10/2025 0226)  Outcome Evaluation: Pt is not symptomatic, vss, will continue to monitor.  Plan of Care Reviewed With: patient  Overall Patient Progress: improving   Goal Outcome Evaluation:      Plan of Care Reviewed With: patient    Overall Patient Progress: improvingOverall Patient Progress: improving    Outcome Evaluation: Pt is not symptomatic, vss, will continue to monitor.

## 2025-07-10 NOTE — PROGRESS NOTES
[facility] Medicine Progress Note  Date of Service: 07/10/2025    Assessment & Plan    Sofia Shay is a 75 year old female with past medical hx of RA, CKD2, COPD, HLD, GERD, hx of bacteremia admitted on 7/9/2025. She presented for 4 days of malaise and found to have a UTI and sepsis.     Acute UTI with hematuria, suspect pyelonephritis  Sepsis with septic shock    4 days of malaise, increased urinary incontinence from baseline, nausea, left flank pain, poor PO intake. C/o severe headache at home, now improving. Arrived septic based on HR of 130, temp of 101.4, and WBC 17.3. UA grossly infected. CT with evidence of cystitis. Has a hx of E. Coli bacteremia from a UTI in 2020 that was resistant to Ampicillin and Bactrim.     On arrival patient received 2.5L of IV fluid (39ml/kg). Her HR dropped from 130 to 70, but her SBP dropped from 105 to 80. To ICU for possible pressor support but was not needed.     BP soft but otherwise improved. Still febrile.     Urine Cx E coli, sens pending    Blood Cx positive Staph species likely contaminant.  - Continue cefepime 2g IV Q12h  - Follow UCx results   - 1L LR over 2 hours for soft BP    PREMA on CKD stage II    Creatinine 1.26 on admission, baseline 0.8-0.9. Likely due to poor PO intake and acute illness.    Improving creatinine 0.97  - NS 75ml/hr     Hyponatremia, mild    131 on admission. Likely due to poor PO intake. Resolved 137.     Thrombocytopenia  141 on admission. Likely due to acute illness.    RA (rheumatoid arthritis), seropositive  Followed with rheumatology 7/7/23. Previously on Prednisone 10mg daily, which was discontinued. Hasn't had a flare in ~5 years.  - Held PTA Golimumab Q8wks, Leucovorin 5mg weekly (Saturdays), Methotrexate 2.5mg weekly (Saturdays)    COPD  Tobacco abuse  Diffuse inspiratory wheezes on admission but not hypoxic or dyspneic. Smokes 1/2 ppd. Declining NRT at this time.    Prediabetes  New  "diagnosis with A1c 5.9% on admission.     Hyperlipidemia LDL goal <100  - Continued PTA Rosuvastatin 20mg    Gastroesophageal reflux disease without esophagitis  - Continued PTA Pantoprazole 40mg    Mild major depression   NICOLE (generalized anxiety disorder)  - Continued PTA Sertraline 175mg, Clonazepam 0.5mg BID PRN, Trazodone 25mg     Clinically Significant Risk Factors Present on Admission         # Hyponatremia: Lowest Na = 131 mmol/L in last 2 days, will monitor as appropriate  # Hypochloremia: Lowest Cl = 94 mmol/L in last 2 days, will monitor as appropriate  # Hypocalcemia: Lowest Ca = 7.3 mg/dL in last 2 days, will monitor and replace as appropriate    # Anion Gap Metabolic Acidosis: Highest Anion Gap = 19 mmol/L in last 2 days, will monitor and treat as appropriate    # Thrombocytopenia: Lowest platelets = 122 in last 2 days, will monitor for bleeding             # Overweight: Estimated body mass index is 26.7 kg/m  as calculated from the following:    Height as of this encounter: 1.518 m (4' 11.75\").    Weight as of this encounter: 61.5 kg (135 lb 9.3 oz).              Diet: Combination Diet Regular Diet  Snacks/Supplements Adult: Magic Cup; With Meals    DVT Prophylaxis: Low Risk/Ambulatory with no VTE prophylaxis indicated  Muse Catheter: Not present  Lines: None     Cardiac Monitoring: ACTIVE order. Indication: ICU  Code Status: Full Code      Discussion/Disposition: Improving but still with fever. Ok for med/surg.     Medically Ready for Discharge: Anticipated in 2-4 Days         Medical Decision Making       35 MINUTES SPENT BY ME on the date of service doing chart review, history, exam, documentation & further activities per the note.        Jonas Centeno MD  [facility]    [facility]  Securely message with [employer] (more info)  Text page via [employer] Paging/Directory     Interval History   Not feeling much better. Still chills and sweats. Had terrible headache at home and " still though now less intense. No back pain.     Physical Exam   Temp:  [97.9  F (36.6  C)-101.4  F (38.6  C)] 100.3  F (37.9  C)  Pulse:  [] 97  Resp:  [10-26] 18  BP: ()/(38-64) 89/51  SpO2:  [92 %-100 %] 96 %    Weights:   Vitals:    07/09/25 1838 07/10/25 0110   Weight: 63 kg (139 lb) 61.5 kg (135 lb 9.3 oz)    Body mass index is 26.7 kg/m .    Constitutional: Alert and oriented, appears flushed otherwise nontoxic  CV: Regular, no edema  Respiratory: CTA bilaterally  GI: Soft, mild to moderate left flank tenderness to deep palpation, no CVA tenderness to percussion bowel sounds normal  Skin: Warm and dry    Data   Recent Labs   Lab 07/10/25  0631 07/10/25  0241 07/09/25  1852   WBC 12.4*  --  17.3*   HGB 12.0  --  13.9   MCV 91  --  88   *  --  141*     --  131*   POTASSIUM 3.4  --  3.7   CHLORIDE 106  --  94*   CO2 18*  --  18*   BUN 14.7  --  17.0   CR 0.97*  --  1.26*   ANIONGAP 13  --  19*   SURYA 7.3*  --  8.5*   GLC 92 131* 131*   ALBUMIN  --   --  4.1   PROTTOTAL  --   --  7.2   BILITOTAL  --   --  0.7   ALKPHOS  --   --  64   ALT  --   --  21   AST  --   --  27   LIPASE  --   --  19       Recent Labs   Lab 07/10/25  0631 07/10/25  0241 07/09/25  1852   GLC 92 131* 131*        Unresulted Labs Ordered in the Past 30 Days of this Admission       Date and Time Order Name Status Description    7/9/2025  7:08 PM Urine Culture Preliminary     7/9/2025  6:41 PM Blood Culture Peripheral blood (BC) Arm, Right Preliminary     7/9/2025  6:41 PM Blood Culture Peripheral blood (BC) Arm, Right Preliminary              Imaging:   Recent Results (from the past 24 hours)   Abd/pelvis CT - no contrast - Stone Protocol    Narrative    EXAM: CT ABDOMEN PELVIS W/O CONTRAST  LOCATION: [facility]  DATE: 7/9/2025    INDICATION: Fever. Urinary tract infection.  COMPARISON: CT abdomen pelvis 5/22/2022.  TECHNIQUE: CT scan of the abdomen and pelvis was performed without IV  contrast. Multiplanar reformats were obtained. Dose reduction techniques were used.  CONTRAST: None.    FINDINGS:     LOWER CHEST: Mild subpleural interstitial thickening and reticulation at both lung bases. No consolidations or pleural effusions. Coronary arterial calcifications.    HEPATOBILIARY: No calcified gallstones or biliary ductal dilation. No liver lesions.    PANCREAS: Normal.    SPLEEN: Normal.    ADRENAL GLANDS: Normal.    KIDNEYS/BLADDER: Trace nonspecific right perinephric edema. No urinary calculi or hydronephrosis. Mild bladder wall thickening. Small amount of gas within the bladder lumen.    BOWEL: No bowel obstruction or inflammation. Normal appendix. Descending and sigmoid colonic diverticulosis. No inflamed diverticuli.    LYMPH NODES: No enlarged lymph nodes.    VASCULATURE: Moderate aortobiiliac atherosclerosis. No abdominal aortic aneurysm.    PELVIC ORGANS: Absent uterus.    MUSCULOSKELETAL: Diffusely demineralized bones. Multilevel degenerative changes of the spine. No acute bony abnormality.      Impression    IMPRESSION:     1.  Mild bladder wall thickening. Correlate with urinalysis for possible cystitis.    2.  Small amount of gas within the bladder lumen could relate to recent instrumentation or infection.     3.  No urinary calculi or hydronephrosis.    4.  Sigmoid colonic diverticulosis. No inflamed diverticuli.        I reviewed all new labs and imaging results over the last 24 hours. I personally reviewed the abdominal CT image(s) showing no obvious perinephric stranding.    Medications   Current Facility-Administered Medications   Medication Dose Route Frequency Provider Last Rate Last Admin    norepinephrine (LEVOPHED) 4 mg in  mL PERIPHERAL infusion  0.01-0.2 mcg/kg/min Intravenous Continuous Hemanth Landry PA-C        sodium chloride 0.9 % infusion   Intravenous Continuous Hemanth Landry PA-C 75 mL/hr at 07/10/25 0510 New Bag at 07/10/25 0510     Current  Facility-Administered Medications   Medication Dose Route Frequency Provider Last Rate Last Admin    ceFEPIme (MAXIPIME) 2 g vial to attach to  mL bag for ADULTS or NS 50 mL bag for PEDS  2 g Intravenous Q12H Hemanth Landry PA-C   2 g at 07/10/25 1226    enoxaparin ANTICOAGULANT (LOVENOX) injection 40 mg  40 mg Subcutaneous Q24H Hemanth Landry PA-C   40 mg at 07/10/25 0114    [Held by provider] leucovorin (WELLCOVORIN) tablet 5 mg  5 mg Oral Weekly Hemanth Landry PA-C        pantoprazole (PROTONIX) EC tablet 40 mg  40 mg Oral Daily Hemanth Landry PA-C   40 mg at 07/10/25 0804    rosuvastatin (CRESTOR) tablet 20 mg  20 mg Oral Daily Hemanth Landry PA-C   20 mg at 07/10/25 0804    sertraline (ZOLOFT) tablet 175 mg  175 mg Oral At Bedtime Damien Nassar DO   175 mg at 07/10/25 0204    traZODone (DESYREL) half-tab 25 mg  25 mg Oral At Bedtime Hemanth Landry PA-C Jay Hemmila, MD  [facility] Medicine

## 2025-07-10 NOTE — PROGRESS NOTES
"CLINICAL NUTRITION SERVICES - ASSESSMENT NOTE    RECOMMENDATIONS FOR MDs/PROVIDERS TO ORDER:  None at this time    Registered Dietitian Interventions:  Please send patient vanilla magic cup BID    Future/Additional Recommendations:  Monitor patient weight, intakes, meds/labs and GI/BM     REASON FOR ASSESSMENT  Positive admission nutrition risk screen    INFORMATION OBTAINED  Assessed patient in room.    NUTRITION HISTORY  Sofia Shay is a 75 year old female who presents with acute UTI with hematuria, sepsis with septic shock, PREMA on CKD stage 2, Hyponatremia, thrombocytopenia, RA, COPD, tobacco abuse, prediabetes, HLD, GERD without esophagitis, mild major depression, and NICOLE.    RD met with patient this AM who reports a decreased appetite over the last 4-5 days. Patient notes she has likely lost weight but is unsure of how much; she reported a UBW to be around 140 lbs. Pt denied any chewing or swallowing difficulties. She did report some mildly looser stools and noted her last BM to be on 7/9. Pt noted she has used ensure in the past but does not like the taste. RD went over the importance of adequate calories and pro to fight off infection and for the preservation of LBM. RD went over alternative high protein sources in the diet with patient during visit. Pt was agreeable to a trial of vanilla magic cup with lunch and dinner meals.    CURRENT NUTRITION ORDERS  Diet: Orders Placed This Encounter      Combination Diet Regular Diet      Snacks/Supplements: No nutrition supplements ordered at this time.      CURRENT INTAKE/TOLERANCE  No recorded meal intakes in patient chart.    LABS  Nutrition-relevant labs: Reviewed  BG elevated-131 mg/dL    MEDICATIONS  Nutrition-relevant medications: Reviewed    ANTHROPOMETRICS  Height: 151.8 cm (4' 11.75\")  Admission Weight: 63 kg (139 lb) (07/09/25 1838)   Most Recent Weight: 61.5 kg (135 lb 9.3 oz) (07/10/25 0110)  IBW: 44.5 kg  BMI: Body mass index is 26.7 kg/m . "   Weight History:   Wt Readings from Last 15 Encounters:   07/10/25 61.5 kg (135 lb 9.3 oz)   04/29/25 63 kg (139 lb)   01/14/25 62.6 kg (138 lb)   08/27/24 65.3 kg (144 lb)   08/23/24 66 kg (145 lb 6.4 oz)   04/05/23 73.9 kg (163 lb)   05/23/22 70 kg (154 lb 4.8 oz)   12/10/21 69.4 kg (153 lb)   09/21/20 68.9 kg (151 lb 14.4 oz)   09/14/20 74.7 kg (164 lb 10.9 oz)   09/02/20 71 kg (156 lb 8 oz)   09/30/19 68.9 kg (152 lb)   08/10/18 70.9 kg (156 lb 3.2 oz)   11/29/17 70.3 kg (155 lb)   06/26/17 70.4 kg (155 lb 3.2 oz)     Patient weight loss does not meet criteria for significance.    Dosing Weight: 48.8 kg, based on adjusted weight    ASSESSED NUTRITION NEEDS  Estimated Energy Needs: 1,220-1,464 kcals/day (25 - 30 kcals/kg)  Justification: Maintenance  Estimated Protein Needs: 59-73 grams protein/day (1.2 - 1.5 grams of pro/kg)  Justification: Increased needs  Estimated Fluid Needs: 1,220-1,464 mL/day (1 mL/kcal)  Justification: Per provider pending fluid status    SYSTEM AND PHYSICAL FINDINGS    GI symptoms: Reviewed; WDL-last BM noted on 7/9  Skin/wounds: Reviewed; WDL    MALNUTRITION  % Intake: </= 50% for >/= 5 days (severe)  % Weight Loss: Weight loss does not meet criteria   Subcutaneous Fat Loss: None observed  Muscle Loss: Unable to determine d/t likely sarcogenic losses; appear more chronic in exam.  Fluid Accumulation/Edema: None noted  Malnutrition Diagnosis: Patient does not meet two of the established criteria necessary for diagnosing malnutrition but is at risk for malnutrition  Malnutrition Present on Admission: No    NUTRITION DIAGNOSIS  Inadequate oral intake related to poor appetite as evidenced by intakes </= 50% for >/= 5 days.    INTERVENTIONS  Collaboration by nutrition professional with other providers  Medical food supplement therapy    GOALS  Patient to consume % of nutritionally adequate meal trays TID, or the equivalent with supplements/snacks.     MONITORING/EVALUATION  Progress  "toward goals will be monitored and evaluated per policy.    Suyapa Toussaint RDN, LD  Clinical Dietitian  Office: [phone]  Hours: M-F 8-3pm   Weekend/Holiday RADHA Null - \"Weekend Clinical Dietitian\" (No longer using pager)    "

## 2025-07-10 NOTE — PROGRESS NOTES
Pt walked 1/2 hallway and back to room with walker/gait belt and w/c behind her in case she was too weak to con't.  O2sat 86-88% RA, loose, congested cough upon returning to room and recovered O2sat 92% RA.   LS clear except LLL w/ fine crackles.   Dr Centeno informed.

## 2025-07-11 PROCEDURE — 120N000001 HC R&B MED SURG/OB

## 2025-07-11 PROCEDURE — 99232 SBSQ HOSP IP/OBS MODERATE 35: CPT | Performed by: INTERNAL MEDICINE

## 2025-07-11 PROCEDURE — 250N000011 HC RX IP 250 OP 636: Performed by: INTERNAL MEDICINE

## 2025-07-11 PROCEDURE — 250N000013 HC RX MED GY IP 250 OP 250 PS 637: Performed by: INTERNAL MEDICINE

## 2025-07-11 RX ORDER — CEFTRIAXONE 2 G/1
2 INJECTION, POWDER, FOR SOLUTION INTRAMUSCULAR; INTRAVENOUS EVERY 24 HOURS
Status: DISCONTINUED | OUTPATIENT
Start: 2025-07-11 | End: 2025-07-12 | Stop reason: HOSPADM

## 2025-07-11 RX ADMIN — ACETAMINOPHEN 650 MG: 325 TABLET ORAL at 11:02

## 2025-07-11 RX ADMIN — SERTRALINE HYDROCHLORIDE 175 MG: 50 TABLET ORAL at 22:01

## 2025-07-11 RX ADMIN — TRAZODONE HYDROCHLORIDE 25 MG: 50 TABLET ORAL at 22:01

## 2025-07-11 RX ADMIN — ROSUVASTATIN CALCIUM 20 MG: 20 TABLET, FILM COATED ORAL at 08:45

## 2025-07-11 RX ADMIN — CEFTRIAXONE 2 G: 2 INJECTION, POWDER, FOR SOLUTION INTRAMUSCULAR; INTRAVENOUS at 11:01

## 2025-07-11 RX ADMIN — PANTOPRAZOLE SODIUM 40 MG: 40 TABLET, DELAYED RELEASE ORAL at 08:45

## 2025-07-11 RX ADMIN — CEFEPIME 2 G: 2 INJECTION, POWDER, FOR SOLUTION INTRAVENOUS at 00:26

## 2025-07-11 RX ADMIN — SERTRALINE HYDROCHLORIDE 175 MG: 50 TABLET ORAL at 00:27

## 2025-07-11 ASSESSMENT — ACTIVITIES OF DAILY LIVING (ADL)
ADLS_ACUITY_SCORE: 31

## 2025-07-11 NOTE — PROGRESS NOTES
[facility] Medicine Progress Note  Date of Service: 07/11/2025    Assessment & Plan    Sofia Shay is a 75 year old female with past medical hx of RA, CKD2, COPD, HLD, GERD, hx of bacteremia admitted on 7/9/2025. She presented for 4 days of malaise and found to have a UTI and sepsis.     Acute UTI with hematuria, suspect pyelonephritis  Sepsis with septic shock    4 days of malaise, increased urinary incontinence from baseline, nausea, left flank pain, poor PO intake. C/o severe headache at home, now improving. Arrived septic based on HR of 130, temp of 101.4, and WBC 17.3. UA grossly infected. CT with evidence of cystitis. Has a hx of E. Coli bacteremia from a UTI in 2020 that was resistant to Ampicillin and Bactrim.     On arrival patient received 2.5L of IV fluid (39ml/kg). Her HR dropped from 130 to 70, but her SBP dropped from 105 to 80. To ICU for possible pressor support but was not needed.     BP soft but otherwise improved. Still febrile.     Urine Cx E coli, pan-sensitive    Blood Cx positive Staph species and GPR resembling diphtheroids, both likely contaminants     Clinically improving but still symptomatic.  - Stopped cefepime and started ceftriaxone 2 g q 24 h today   - recheck WBC in AM    PREMA on CKD stage II    Creatinine 1.26 on admission, baseline 0.8-0.9. Likely due to poor PO intake and acute illness.    Improving creatinine 0.97   - recheck in AM  - NS 75ml/hr     Hyponatremia, mild    131 on admission. Likely due to poor PO intake. Resolved 137.     Thrombocytopenia  141 on admission. Likely due to acute illness.    - recheck in AM    RA (rheumatoid arthritis), seropositive    Followed with rheumatology 7/7/23. Previously on Prednisone 10mg daily, which was discontinued. Hasn't had a flare in ~5 years.  - Held PTA Golimumab Q8wks, Leucovorin 5mg weekly (Saturdays), Methotrexate 2.5mg weekly (Saturdays)    COPD  Tobacco abuse    Diffuse inspiratory  "wheezes on admission but not hypoxic or dyspneic. Smokes 1/2 ppd. Declining NRT at this time.    Prediabetes    New diagnosis with A1c 5.9% on admission.     Hyperlipidemia LDL goal <100  - Continued PTA Rosuvastatin 20mg    Gastroesophageal reflux disease without esophagitis  - Continued PTA Pantoprazole 40mg    Mild major depression   NICOLE (generalized anxiety disorder)  - Continued PTA Sertraline 175mg, Clonazepam 0.5mg BID PRN, Trazodone 25mg     Clinically Significant Risk Factors         # Hyponatremia: Lowest Na = 131 mmol/L in last 2 days, will monitor as appropriate  # Hypochloremia: Lowest Cl = 94 mmol/L in last 2 days, will monitor as appropriate  # Hypocalcemia: Lowest Ca = 7.3 mg/dL in last 2 days, will monitor and replace as appropriate    # Anion Gap Metabolic Acidosis: Highest Anion Gap = 19 mmol/L in last 2 days, will monitor and treat as appropriate    # Thrombocytopenia: Lowest platelets = 122 in last 2 days, will monitor for bleeding              # Overweight: Estimated body mass index is 25.79 kg/m  as calculated from the following:    Height as of this encounter: 1.518 m (4' 11.75\").    Weight as of this encounter: 59.4 kg (130 lb 15.3 oz)., PRESENT ON ADMISSION            Diet: Combination Diet Regular Diet  Snacks/Supplements Adult: Magic Cup; With Meals    DVT Prophylaxis: Low Risk/Ambulatory with no VTE prophylaxis indicated  Muse Catheter: Not present  Lines: None     Cardiac Monitoring: None  Code Status: Full Code      Discussion/Disposition: Improving. Increasing activity.     Medically Ready for Discharge: Anticipated Tomorrow         Medical Decision Making       30 MINUTES SPENT BY ME on the date of service doing chart review, history, exam, documentation & further activities per the note.        Jonas Centeno MD  [facility]    [facility]  Securely message with [employer] (more info)  Text page via [facility] Paging/Directory       Interval History   Better " at times and other times very weak and tired. Still with headache.     Physical Exam   Temp:  [98  F (36.7  C)-99  F (37.2  C)] 98  F (36.7  C)  Pulse:  [70-88] 70  Resp:  [16-18] 16  BP: ()/(49-64) 118/64  SpO2:  [96 %-98 %] 98 %    Weights:   Vitals:    07/09/25 1838 07/10/25 0110 07/11/25 0533   Weight: 63 kg (139 lb) 61.5 kg (135 lb 9.3 oz) 59.4 kg (130 lb 15.3 oz)    Body mass index is 25.79 kg/m .    Constitutional: alert and oriented, NAD  CV: Regular, no murmur, no edema  Respiratory: CTA bilaterally  GI: Soft, non-tender, bowel sounds normal   Skin: Warm and dry    Data   Recent Labs   Lab 07/10/25  0631 07/10/25  0241 07/09/25  1852   WBC 12.4*  --  17.3*   HGB 12.0  --  13.9   MCV 91  --  88   *  --  141*     --  131*   POTASSIUM 3.4  --  3.7   CHLORIDE 106  --  94*   CO2 18*  --  18*   BUN 14.7  --  17.0   CR 0.97*  --  1.26*   ANIONGAP 13  --  19*   SURYA 7.3*  --  8.5*   GLC 92 131* 131*   ALBUMIN  --   --  4.1   PROTTOTAL  --   --  7.2   BILITOTAL  --   --  0.7   ALKPHOS  --   --  64   ALT  --   --  21   AST  --   --  27   LIPASE  --   --  19       Recent Labs   Lab 07/10/25  0631 07/10/25  0241 07/09/25  1852   GLC 92 131* 131*        Unresulted Labs Ordered in the Past 30 Days of this Admission       Date and Time Order Name Status Description    7/9/2025  7:08 PM Urine Culture Preliminary     7/9/2025  6:41 PM Blood Culture Peripheral blood (BC) Arm, Right Preliminary     7/9/2025  6:41 PM Blood Culture Peripheral blood (BC) Arm, Right Preliminary              Imaging: No results found for this or any previous visit (from the past 24 hours).     I reviewed all new labs and imaging results over the last 24 hours. I personally reviewed no images or EKG's today.    Medications   Current Facility-Administered Medications   Medication Dose Route Frequency Provider Last Rate Last Admin    sodium chloride 0.9 % infusion   Intravenous Continuous Hemmila, Jonas Knott MD   Stopped at  07/10/25 1615     Current Facility-Administered Medications   Medication Dose Route Frequency Provider Last Rate Last Admin    cefTRIAXone (ROCEPHIN) 2 g vial to attach to  ml bag for ADULTS or NS 50 ml bag for PEDS  2 g Intravenous Q24H Jonas Centeno  mL/hr at 07/11/25 1101 2 g at 07/11/25 1101    [Held by provider] leucovorin (WELLCOVORIN) tablet 5 mg  5 mg Oral Weekly Jonas Centeno MD        pantoprazole (PROTONIX) EC tablet 40 mg  40 mg Oral Daily Jonas Centeno MD   40 mg at 07/11/25 0845    rosuvastatin (CRESTOR) tablet 20 mg  20 mg Oral Daily Jonas Centeno MD   20 mg at 07/11/25 0845    sertraline (ZOLOFT) tablet 175 mg  175 mg Oral At Bedtime Jonas Centeno MD   175 mg at 07/11/25 0027    traZODone (DESYREL) half-tab 25 mg  25 mg Oral At Bedtime Jonas Centeno MD Jay Hemmila, MD  [facility]

## 2025-07-11 NOTE — PLAN OF CARE
Problem: Adult Inpatient Plan of Care  Goal: Optimal Comfort and Wellbeing  Outcome: Progressing     Problem: Adult Inpatient Plan of Care  Goal: Absence of Hospital-Acquired Illness or Injury  Intervention: Prevent Skin Injury  Recent Flowsheet Documentation  Taken 7/11/2025 0000 by Abril Nam RN  Body Position: position changed independently   Goal Outcome Evaluation:  VSS,  BP up after last bolus of IV fluids given, Pt had updates to her blood culture results that was reported to Dr. Centeno last evening. No new orders at this time.

## 2025-07-11 NOTE — PLAN OF CARE
"  Problem: Adult Inpatient Plan of Care  Goal: Plan of Care Review  Description: The Plan of Care Review/Shift note should be completed every shift.  The Outcome Evaluation is a brief statement about your assessment that the patient is improving, declining, or no change.  This information will be displayed automatically on your shift  note.  Flowsheets (Taken 7/11/2025 1802)  Outcome Evaluation: Pt A/Ox4, SBA. pt complains of urine fregency/urgency/incontintence, pt had shower today. Pt's IV SL as prescribed, see mar.Pt reports \" feeling goofy\", provider updated, ABX chnaged. Will continue with plan of care, and will monitor pt throughout shift.  Plan of Care Reviewed With: patient  Overall Patient Progress: improving  Goal: Absence of Hospital-Acquired Illness or Injury  Intervention: Identify and Manage Fall Risk  Recent Flowsheet Documentation  Taken 7/11/2025 1700 by Mikayla Sotelo RN  Safety Promotion/Fall Prevention:   clutter free environment maintained   lighting adjusted   nonskid shoes/slippers when out of bed   patient and family education   assistive device/personal items within reach   safety round/check completed   room organization consistent  Taken 7/11/2025 0839 by Mikayla Sotelo RN  Safety Promotion/Fall Prevention:   clutter free environment maintained   lighting adjusted   nonskid shoes/slippers when out of bed   patient and family education   assistive device/personal items within reach   safety round/check completed   room organization consistent  Intervention: Prevent Skin Injury  Recent Flowsheet Documentation  Taken 7/11/2025 1700 by Mikayla Sotelo RN  Body Position: position changed independently  Taken 7/11/2025 0839 by Mikayla Sotelo RN  Body Position: position changed independently  Intervention: Prevent and Manage VTE (Venous Thromboembolism) Risk  Recent Flowsheet Documentation  Taken 7/11/2025 1700 by Mikayla Sotelo RN  VTE Prevention/Management: other (see comments)  Taken " 7/11/2025 0839 by Mikayla Sotelo RN  VTE Prevention/Management: other (see comments)  Goal: Optimal Comfort and Wellbeing  Intervention: Provide Person-Centered Care  Recent Flowsheet Documentation  Taken 7/11/2025 1700 by Mikayla Sotelo RN  Trust Relationship/Rapport:   choices provided   care explained   questions answered   questions encouraged   reassurance provided   thoughts/feelings acknowledged  Taken 7/11/2025 0839 by Mikayla Sotelo RN  Trust Relationship/Rapport:   choices provided   care explained   questions answered   questions encouraged   reassurance provided   thoughts/feelings acknowledged     Problem: Delirium  Goal: Optimal Coping  Intervention: Optimize Psychosocial Adjustment to Delirium  Recent Flowsheet Documentation  Taken 7/11/2025 1700 by Mikayla Sotelo RN  Supportive Measures:   active listening utilized   self-care encouraged  Taken 7/11/2025 0839 by Mikayla Sotelo RN  Supportive Measures:   active listening utilized   self-care encouraged  Goal: Improved Behavioral Control  Intervention: Prevent and Manage Agitation  Recent Flowsheet Documentation  Taken 7/11/2025 1700 by Mikayla Sotelo RN  Environment Familiarity/Consistency: daily routine followed  Taken 7/11/2025 0839 by Mikayla Sotelo RN  Environment Familiarity/Consistency: daily routine followed  Intervention: Minimize Safety Risk  Recent Flowsheet Documentation  Taken 7/11/2025 1700 by Mikayla Sotelo RN  Enhanced Safety Measures: review medications for side effects with activity  Trust Relationship/Rapport:   choices provided   care explained   questions answered   questions encouraged   reassurance provided   thoughts/feelings acknowledged  Taken 7/11/2025 0839 by Mikayla Sotelo RN  Enhanced Safety Measures: review medications for side effects with activity  Trust Relationship/Rapport:   choices provided   care explained   questions answered   questions encouraged   reassurance provided   thoughts/feelings  acknowledged  Goal: Improved Attention and Thought Clarity  Intervention: Maximize Cognitive Function  Recent Flowsheet Documentation  Taken 7/11/2025 1700 by Mikayla Sotelo RN  Sensory Stimulation Regulation: care clustered  Reorientation Measures:   calendar in view   clock in view  Taken 7/11/2025 0839 by Mikayla Sotelo RN  Sensory Stimulation Regulation: care clustered  Reorientation Measures:   calendar in view   clock in view     Problem: Risk for Delirium  Goal: Optimal Coping  Intervention: Optimize Psychosocial Adjustment to Delirium  Recent Flowsheet Documentation  Taken 7/11/2025 1700 by Mikayla Sotelo RN  Supportive Measures:   active listening utilized   self-care encouraged  Taken 7/11/2025 0839 by Mikayla Sotelo RN  Supportive Measures:   active listening utilized   self-care encouraged  Goal: Improved Behavioral Control  Intervention: Prevent and Manage Agitation  Recent Flowsheet Documentation  Taken 7/11/2025 1700 by Mikayla Sotelo RN  Environment Familiarity/Consistency: daily routine followed  Taken 7/11/2025 0839 by Mikayla Sotelo RN  Environment Familiarity/Consistency: daily routine followed  Intervention: Minimize Safety Risk  Recent Flowsheet Documentation  Taken 7/11/2025 1700 by Mikayla Sotelo RN  Enhanced Safety Measures: review medications for side effects with activity  Trust Relationship/Rapport:   choices provided   care explained   questions answered   questions encouraged   reassurance provided   thoughts/feelings acknowledged  Taken 7/11/2025 0839 by Mikayla Sotelo RN  Enhanced Safety Measures: review medications for side effects with activity  Trust Relationship/Rapport:   choices provided   care explained   questions answered   questions encouraged   reassurance provided   thoughts/feelings acknowledged  Goal: Improved Attention and Thought Clarity  Intervention: Maximize Cognitive Function  Recent Flowsheet Documentation  Taken 7/11/2025 1700 by Mikayla Sotelo  "RN  Sensory Stimulation Regulation: care clustered  Reorientation Measures:   calendar in view   clock in view  Taken 7/11/2025 0839 by Mikayla Sotelo RN  Sensory Stimulation Regulation: care clustered  Reorientation Measures:   calendar in view   clock in view   Goal Outcome Evaluation:      Plan of Care Reviewed With: patient    Overall Patient Progress: improvingOverall Patient Progress: improving    Outcome Evaluation: Pt A/Ox4, SBA. pt complains of urine fregency/urgency/incontintence, pt had shower today. Pt's IV SL as prescribed, see mar.Pt reports \" feeling goofy\", provider updated, ABX chnaged. Will continue with plan of care, and will monitor pt throughout shift.  /64   Pulse 70   Temp 98  F (36.7  C) (Oral)   Resp 16   Ht 1.518 m (4' 11.75\")   Wt 59.4 kg (130 lb 15.3 oz)   LMP  (LMP Unknown)   SpO2 98%   BMI 25.79 kg/m    Mikayla Sotelo RN on 7/11/2025 at 6:07 PM          "

## 2025-07-12 VITALS
HEIGHT: 60 IN | HEART RATE: 66 BPM | SYSTOLIC BLOOD PRESSURE: 114 MMHG | RESPIRATION RATE: 18 BRPM | BODY MASS INDEX: 25.71 KG/M2 | TEMPERATURE: 98.5 F | DIASTOLIC BLOOD PRESSURE: 63 MMHG | OXYGEN SATURATION: 94 % | WEIGHT: 130.95 LBS

## 2025-07-12 LAB
ANION GAP SERPL CALCULATED.3IONS-SCNC: 13 MMOL/L (ref 7–15)
BACTERIA SPEC CULT: ABNORMAL
BACTERIA UR CULT: ABNORMAL
BUN SERPL-MCNC: 9.4 MG/DL (ref 8–23)
CALCIUM SERPL-MCNC: 8 MG/DL (ref 8.8–10.4)
CHLORIDE SERPL-SCNC: 104 MMOL/L (ref 98–107)
CREAT SERPL-MCNC: 0.88 MG/DL (ref 0.51–0.95)
EGFRCR SERPLBLD CKD-EPI 2021: 67 ML/MIN/1.73M2
ERYTHROCYTE [DISTWIDTH] IN BLOOD BY AUTOMATED COUNT: 14.6 % (ref 10–15)
GLUCOSE SERPL-MCNC: 107 MG/DL (ref 70–99)
HCO3 SERPL-SCNC: 20 MMOL/L (ref 22–29)
HCT VFR BLD AUTO: 35.5 % (ref 35–47)
HGB BLD-MCNC: 11.7 G/DL (ref 11.7–15.7)
MCH RBC QN AUTO: 29.6 PG (ref 26.5–33)
MCHC RBC AUTO-ENTMCNC: 33 G/DL (ref 31.5–36.5)
MCV RBC AUTO: 90 FL (ref 78–100)
PLATELET # BLD AUTO: 160 10E3/UL (ref 150–450)
POTASSIUM SERPL-SCNC: 3.4 MMOL/L (ref 3.4–5.3)
RBC # BLD AUTO: 3.95 10E6/UL (ref 3.8–5.2)
SODIUM SERPL-SCNC: 137 MMOL/L (ref 135–145)
WBC # BLD AUTO: 8.2 10E3/UL (ref 4–11)

## 2025-07-12 PROCEDURE — 85018 HEMOGLOBIN: CPT | Performed by: INTERNAL MEDICINE

## 2025-07-12 PROCEDURE — 250N000011 HC RX IP 250 OP 636: Performed by: INTERNAL MEDICINE

## 2025-07-12 PROCEDURE — 250N000013 HC RX MED GY IP 250 OP 250 PS 637: Performed by: INTERNAL MEDICINE

## 2025-07-12 PROCEDURE — 80048 BASIC METABOLIC PNL TOTAL CA: CPT | Performed by: INTERNAL MEDICINE

## 2025-07-12 PROCEDURE — 36415 COLL VENOUS BLD VENIPUNCTURE: CPT | Performed by: INTERNAL MEDICINE

## 2025-07-12 RX ORDER — SULFAMETHOXAZOLE AND TRIMETHOPRIM 800; 160 MG/1; MG/1
1 TABLET ORAL 2 TIMES DAILY
Qty: 14 TABLET | Refills: 0 | Status: SHIPPED | OUTPATIENT
Start: 2025-07-12 | End: 2025-07-19

## 2025-07-12 RX ADMIN — CEFTRIAXONE 2 G: 2 INJECTION, POWDER, FOR SOLUTION INTRAMUSCULAR; INTRAVENOUS at 13:02

## 2025-07-12 RX ADMIN — ACETAMINOPHEN 650 MG: 325 TABLET ORAL at 09:43

## 2025-07-12 RX ADMIN — PANTOPRAZOLE SODIUM 40 MG: 40 TABLET, DELAYED RELEASE ORAL at 08:23

## 2025-07-12 RX ADMIN — ROSUVASTATIN CALCIUM 20 MG: 20 TABLET, FILM COATED ORAL at 08:23

## 2025-07-12 ASSESSMENT — ACTIVITIES OF DAILY LIVING (ADL)
ADLS_ACUITY_SCORE: 31

## 2025-07-12 NOTE — PLAN OF CARE
Problem: Adult Inpatient Plan of Care  Goal: Absence of Hospital-Acquired Illness or Injury  Intervention: Identify and Manage Fall Risk  Recent Flowsheet Documentation  Taken 7/12/2025 0000 by Micki Encinas RN  Safety Promotion/Fall Prevention:   clutter free environment maintained   lighting adjusted   nonskid shoes/slippers when out of bed   patient and family education   assistive device/personal items within reach   room near nurse's station     Problem: Delirium  Goal: Improved Behavioral Control  Intervention: Minimize Safety Risk  Recent Flowsheet Documentation  Taken 7/12/2025 0000 by Micki Encinas RN  Enhanced Safety Measures: room near unit station  Trust Relationship/Rapport:   choices provided   care explained   questions answered   questions encouraged   reassurance provided   thoughts/feelings acknowledged   Goal Outcome Evaluation:      Plan of Care Reviewed With: patient    Overall Patient Progress: improvingOverall Patient Progress: improving    Outcome Evaluation: Patient is A/Ox4 and is up with SBA, Patient denies any pain and has been resting comfortably throughout the night. Per patient request purewick is in place and patent with clear yellow urine. IV is SL

## 2025-07-12 NOTE — DISCHARGE SUMMARY
[facility]  Discharge Summary  Hospital Medicine       Date of Admission:  7/9/2025  Date of Discharge:  7/12/2025  Discharging Provider: Jonas Centeno MD      Identification and Chief Compaint: Sofia Shay is a 75 year old female who presented on 7/9/2025 with complaint of ***.    Discharge Diagnoses   ***     Follow-ups Needed After Discharge   Follow-up Appointments       Hospital Follow-up with Existing Primary Care Provider (PCP)          Schedule Primary Care visit within: 7 Days           {Additional follow-up instructions/to-do's for PCP    :***}    Unresulted Labs Ordered in the Past 30 Days of this Admission       Date and Time Order Name Status Description    7/9/2025  6:41 PM Blood Culture Peripheral blood (BC) Arm, Right Preliminary         These results will be followed up by ***    Hospital Course   Sofia Shay is a 75 year old female with past medical hx of RA, CKD2, COPD, HLD, GERD, hx of bacteremia admitted on 7/9/2025. She presented for 4 days of malaise and found to have a UTI and sepsis.     Acute UTI with hematuria, suspect pyelonephritis  Sepsis with septic shock    4 days of malaise, increased urinary incontinence from baseline, nausea, left flank pain, poor PO intake. C/o severe headache at home, now improving. Arrived septic based on HR of 130, temp of 101.4, and WBC 17.3. UA grossly infected. CT with evidence of cystitis. Has a hx of E. Coli bacteremia from a UTI in 2020 that was resistant to Ampicillin and Bactrim.     On arrival patient received 2.5L of IV fluid (39ml/kg). Her HR dropped from 130 to 70, but her SBP dropped from 105 to 80. To ICU for possible pressor support but was not needed.     BP soft but otherwise improved. Still febrile.     Urine Cx E coli, resistant to ciprofloxacin otherwise pan-sensitive    Blood Cx positive Staph species and GPR resembling diphtheroids, both likely contaminants     Clinically improving but  still symptomatic.  - Stopped cefepime and started ceftriaxone 2 g q 24 h today   - recheck WBC in AM    PREMA on CKD stage II    Creatinine 1.26 on admission, baseline 0.8-0.9. Likely due to poor PO intake and acute illness.    Improving creatinine 0.97   - recheck in AM  - NS 75ml/hr     Hyponatremia, mild    131 on admission. Likely due to poor PO intake. Resolved 137.     Thrombocytopenia  141 on admission. Likely due to acute illness.    - recheck in AM    RA (rheumatoid arthritis), seropositive    Followed with rheumatology 7/7/23. Previously on Prednisone 10mg daily, which was discontinued. Hasn't had a flare in ~5 years.  - Held PTA Golimumab Q8wks, Leucovorin 5mg weekly (Saturdays), Methotrexate 2.5mg weekly (Saturdays)    COPD  Tobacco abuse    Diffuse inspiratory wheezes on admission but not hypoxic or dyspneic. Smokes 1/2 ppd. Declining NRT at this time.    Prediabetes    New diagnosis with A1c 5.9% on admission.     Hyperlipidemia LDL goal <100  - Continued PTA Rosuvastatin 20mg    Gastroesophageal reflux disease without esophagitis  - Continued PTA Pantoprazole 40mg    Mild major depression   NICOLE (generalized anxiety disorder)  - Continued PTA Sertraline 175mg, Clonazepam 0.5mg BID PRN, Trazodone 25mg         ***    Consultations This Hospital Stay   None    Code Status   Full Code    The discharge plan was discussed with the patient ***, and *** expressed understanding.     Time Spent on this Encounter   Total time on this discharge was *** minutes.       Jonas Centeno MD  [facility]  ______________________________________________________________________    Physical Exam   Vital Signs: Temp: 98.5  F (36.9  C) Temp src: Oral BP: 129/69 Pulse: 78   Resp: 18 SpO2: 96 % O2 Device: None (Room air)    Weight: 130 lbs 15.25 oz  Constitutional: ***  CV: Regular  Respiratory: CTA bilaterally  GI: Soft, nontender  Skin: Warm and dry  ***       Primary Care Physician   Zulma  "[facility]  [address] [address]     Discharge Disposition   { :9128490::\"Discharged to home\"}  Condition at discharge: {CONDITION:920374::\"Stable\"}    Significant Results and Procedures   {Data for Discharge Summary:611579}  Procedures  None***    Discharge Orders      Reason for your hospital stay    Suspect kidney infection which has improved on antibiotics.     Activity    Your activity upon discharge: activity as tolerated     Diet    Follow this diet upon discharge:     Regular Diet     Hospital Follow-up with Existing Primary Care Provider (PCP)          Discharge Medications   Current Discharge Medication List        START taking these medications    Details   sulfamethoxazole-trimethoprim (BACTRIM DS) 800-160 MG tablet Take 1 tablet by mouth 2 times daily for 7 days. Starting with morning dose 7/13/2025 (Sunday)  Qty: 14 tablet, Refills: 0    Associated Diagnoses: Pyelonephritis           CONTINUE these medications which have NOT CHANGED    Details   calcium carbonate-vitamin D 600-200 MG-UNIT TABS Take 1 tablet by mouth daily Hold off on taking this until you're done with your ciprofloxacin (antibiotic), then resume it as usual.  Qty:        clonazePAM (KLONOPIN) 0.5 MG tablet Take 1 tablet by mouth twice daily as needed for anxiety  Qty: 30 tablet, Refills: 2    Associated Diagnoses: Anxiety      denosumab (PROLIA) 60 mg/mL injection Inject 60 mg subcutaneously.      Golimumab (SIMPONI ARIA IV) IV Treatment every 8 weeks      leucovorin (WELLCOVORIN) 5 MG tablet Take 5 mg by mouth once a week Once per week, after methotrexate      loratadine (CLARITIN) 10 MG tablet Take 10 mg by mouth daily as needed.      METHOTREXATE 2.5 MG OR TABS Take by mouth every 7 days ( 5 Tablets x 2.5 mg) Once weekly      OVER-THE-COUNTER Place 1 drop into both eyes 2 times daily. Soothe XP artificial tears  Qty: 1 Bottle    Associated Diagnoses: Dry eye syndrome      pantoprazole (PROTONIX) 40 MG EC tablet Take 1 " tablet (40 mg) by mouth daily.  Qty: 90 tablet, Refills: 3    Associated Diagnoses: Gastroesophageal reflux disease without esophagitis      rosuvastatin (CRESTOR) 20 MG tablet Take 1 tablet (20 mg) by mouth daily.  Qty: 90 tablet, Refills: 3    Associated Diagnoses: Hyperlipidemia LDL goal <100      !! sertraline (ZOLOFT) 100 MG tablet TAKE 1 & 1/2 (ONE & ONE-HALF) TABLETS BY MOUTH ONCE DAILY  Qty: 135 tablet, Refills: 3    Associated Diagnoses: Anxiety      !! sertraline (ZOLOFT) 25 MG tablet Take 1 tablet (25 mg) by mouth daily. Add to the sertraline 150 mg prescription for a total daily dose of 175 mg  Qty: 90 tablet, Refills: 3    Associated Diagnoses: Mild major depression; NICOLE (generalized anxiety disorder)      traZODone (DESYREL) 50 MG tablet Take 1 tablet (50 mg) by mouth at bedtime.  Qty: 30 tablet, Refills: 5    Associated Diagnoses: Primary insomnia      VENTOLIN  (90 Base) MCG/ACT inhaler Inhale 1-2 puffs into the lungs every 4 hours as needed for shortness of breath, wheezing or cough.  Qty: 18 g, Refills: 4    Comments: Pharmacy may dispense brand covered by insurance (Proair, or proventil or ventolin or generic albuterol inhaler)  Associated Diagnoses: Chronic obstructive pulmonary disease, unspecified COPD type (H)       !! - Potential duplicate medications found. Please discuss with provider.        Allergies   Allergies   Allergen Reactions    Gabapentin Other (See Comments)     dizzy    Latex Itching    Pcn [Penicillins] Hives

## 2025-07-13 ENCOUNTER — HEALTH MAINTENANCE LETTER (OUTPATIENT)
Age: 75
End: 2025-07-13

## 2025-07-14 ENCOUNTER — TELEPHONE (OUTPATIENT)
Dept: FAMILY MEDICINE | Facility: CLINIC | Age: 75
End: 2025-07-14
Payer: MEDICARE

## 2025-07-14 ENCOUNTER — PATIENT OUTREACH (OUTPATIENT)
Dept: CARE COORDINATION | Facility: CLINIC | Age: 75
End: 2025-07-14
Payer: MEDICARE

## 2025-07-14 LAB — BACTERIA SPEC CULT: NO GROWTH

## 2025-07-14 NOTE — PROGRESS NOTES
Clinic Care Coordination Contact  Transitions of Care Outreach    Chief Complaint   Patient presents with    Clinic Care Coordination - Post Hospital       Most Recent Admission Date: 7/9/2025   Most Recent Admission Diagnosis: Sepsis due to urinary tract infection (H) - A41.9, N39.0     Most Recent Discharge Date: 7/12/2025   Most Recent Discharge Diagnosis: Sepsis due to urinary tract infection (H) - A41.9, N39.0  Pyelonephritis - N12     Transitions of Care Assessment    Discharge Assessment  How are you doing now that you are home?: Pt stated that she is feeling better.  How are your symptoms? (Red Flag symptoms escalate to triage hotline per guidelines): Improved  Do you know how to contact your clinic care team if you have future questions or changes to your health status? : Yes  Does the patient have their discharge instructions? : Yes  Does the patient have questions regarding their discharge instructions? : No  Were you started on any new medications or were there changes to any of your previous medications? : Yes  Does the patient have all of their medications?: Yes  Do you have questions regarding any of your medications? : No  Do you have all of your needed medical supplies or equipment (DME)?  (i.e. oxygen tank, CPAP, cane, etc.): Yes    Post-op ([facility] CTA Only)  If the patient had a surgery or procedure, do they have any questions for a nurse?: No         [facility] Explained and offered Care Coordination support to eligible patients: Yes    Patient accepted? No    Follow up Plan     Discharge Follow-Up  Discharge follow up appointment scheduled in alignment with recommended follow up timeframe or Transitions of Risk Category? (Low = within 30 days; Moderate= within 14 days; High= within 7 days): No  Patient's follow up appointment not scheduled: Patient declined scheduling support. Education on the importance of transitions of care follow up. Provided scheduling phone number.    No future  appointments.    Outpatient Plan as outlined on AVS reviewed with patient.      For any urgent concerns, please contact our 24 hour nurse triage line: [phone]       MARCI Landeros  [phone]  [facility]

## 2025-07-14 NOTE — TELEPHONE ENCOUNTER
Left message for patient to call back. Cyril Harris NP asked to call patient and schedule her for a Hospital Follow up this Wednesday 7/16 or Thursday 7/17 ok to take her virtual 1120 spot for In person Hospital Follow up .

## 2025-07-16 ENCOUNTER — OFFICE VISIT (OUTPATIENT)
Dept: FAMILY MEDICINE | Facility: CLINIC | Age: 75
End: 2025-07-16
Payer: MEDICARE

## 2025-07-16 VITALS
TEMPERATURE: 97.8 F | OXYGEN SATURATION: 95 % | SYSTOLIC BLOOD PRESSURE: 90 MMHG | HEART RATE: 69 BPM | BODY MASS INDEX: 26.5 KG/M2 | RESPIRATION RATE: 20 BRPM | DIASTOLIC BLOOD PRESSURE: 60 MMHG | HEIGHT: 60 IN | WEIGHT: 135 LBS

## 2025-07-16 DIAGNOSIS — N39.0 SEPSIS DUE TO URINARY TRACT INFECTION (H): Primary | ICD-10-CM

## 2025-07-16 DIAGNOSIS — E87.1 HYPONATREMIA: ICD-10-CM

## 2025-07-16 DIAGNOSIS — F51.01 PRIMARY INSOMNIA: ICD-10-CM

## 2025-07-16 DIAGNOSIS — Z09 HOSPITAL DISCHARGE FOLLOW-UP: ICD-10-CM

## 2025-07-16 DIAGNOSIS — F32.0 MILD MAJOR DEPRESSION: ICD-10-CM

## 2025-07-16 DIAGNOSIS — A41.9 SEPSIS DUE TO URINARY TRACT INFECTION (H): Primary | ICD-10-CM

## 2025-07-16 DIAGNOSIS — R73.03 PREDIABETES: ICD-10-CM

## 2025-07-16 DIAGNOSIS — N17.9 AKI (ACUTE KIDNEY INJURY): ICD-10-CM

## 2025-07-16 DIAGNOSIS — F41.1 GAD (GENERALIZED ANXIETY DISORDER): ICD-10-CM

## 2025-07-16 PROCEDURE — 3074F SYST BP LT 130 MM HG: CPT | Performed by: NURSE PRACTITIONER

## 2025-07-16 PROCEDURE — 1126F AMNT PAIN NOTED NONE PRSNT: CPT | Performed by: NURSE PRACTITIONER

## 2025-07-16 PROCEDURE — 3078F DIAST BP <80 MM HG: CPT | Performed by: NURSE PRACTITIONER

## 2025-07-16 PROCEDURE — 1111F DSCHRG MED/CURRENT MED MERGE: CPT | Performed by: NURSE PRACTITIONER

## 2025-07-16 PROCEDURE — 99495 TRANSJ CARE MGMT MOD F2F 14D: CPT | Performed by: NURSE PRACTITIONER

## 2025-07-16 RX ORDER — SERTRALINE HYDROCHLORIDE 25 MG/1
25 TABLET, FILM COATED ORAL DAILY
Qty: 90 TABLET | Refills: 3 | Status: SHIPPED | OUTPATIENT
Start: 2025-07-16

## 2025-07-16 RX ORDER — TRAZODONE HYDROCHLORIDE 50 MG/1
50 TABLET ORAL AT BEDTIME
Qty: 30 TABLET | Refills: 5 | Status: SHIPPED | OUTPATIENT
Start: 2025-07-16

## 2025-07-16 RX ORDER — SERTRALINE HYDROCHLORIDE 150 MG/1
150 CAPSULE ORAL DAILY
Qty: 90 CAPSULE | Refills: 3 | Status: SHIPPED | OUTPATIENT
Start: 2025-07-16

## 2025-07-16 ASSESSMENT — PATIENT HEALTH QUESTIONNAIRE - PHQ9
SUM OF ALL RESPONSES TO PHQ QUESTIONS 1-9: 9
SUM OF ALL RESPONSES TO PHQ QUESTIONS 1-9: 9
10. IF YOU CHECKED OFF ANY PROBLEMS, HOW DIFFICULT HAVE THESE PROBLEMS MADE IT FOR YOU TO DO YOUR WORK, TAKE CARE OF THINGS AT HOME, OR GET ALONG WITH OTHER PEOPLE: SOMEWHAT DIFFICULT

## 2025-07-16 ASSESSMENT — PAIN SCALES - GENERAL: PAINLEVEL_OUTOF10: NO PAIN (0)

## 2025-07-16 NOTE — PROGRESS NOTES
"  Assessment & Plan     Sepsis due to urinary tract infection (H)  PREMA (acute kidney injury)  Mild major depression (H)  - Sertraline HCl 150 MG CAPS; Take 150 mg by mouth daily.  - sertraline (ZOLOFT) 25 MG tablet; Take 1 tablet (25 mg) by mouth daily. Add to the sertraline 150 mg prescription for a total daily dose of 175 mg  NICOLE (generalized anxiety disorder)  - Sertraline HCl 150 MG CAPS; Take 150 mg by mouth daily.  - sertraline (ZOLOFT) 25 MG tablet; Take 1 tablet (25 mg) by mouth daily. Add to the sertraline 150 mg prescription for a total daily dose of 175 mg  Primary insomnia  - traZODone (DESYREL) 50 MG tablet; Take 1 tablet (50 mg) by mouth at bedtime.  Hyponatremia  Prediabetes  Hospital discharge follow-up    75-year-old female with past medical history significant for rheumatoid arthritis, COPD, hyperlipidemia, GERD, tobacco abuse, depression and anxiety.  Presented to the ER on July 9 with a 4-day history of malaise and found to have a UTI with sepsis.  She was admitted to hospital due to diagnosis of septic shock, hypotension, and acute kidney injury, hyponatremia and thrombocytopenia.  While hospitalized she was treated with IV antibiotics.  Discharged on oral Bactrim.  Kidney function had returned to normal by time of discharge; as did the sodium levels and platelets.  She also had a new diagnosis of prediabetes with an A1c of 5.9 on admission.  Her other medical conditions were stable while hospitalized.    Complete full course of antibiotics. Will switch her sertraline 150 mg tablet to a capsule per her request. She takes an additional 25 mg tablet daily - this is not available as a capsule so will leave unchanged. Return criteria given.      MED REC REQUIRED  Post Medication Reconciliation Status: discharge medications reconciled and changed, per note/orders  BMI  Estimated body mass index is 26.81 kg/m  as calculated from the following:    Height as of this encounter: 1.511 m (4' 11.5\").    " Weight as of this encounter: 61.2 kg (135 lb).     The risks, benefits and treatment options of prescribed medications or other treatments have been discussed with the patient. The patient verbalized their understanding and should call or follow up if no improvement or if they develop further problems.  Zulma Harris CNP                Suha Black is a 75 year old, presenting for the following health issues:  Hospital F/U        7/16/2025    11:28 AM   Additional Questions   Roomed by Jeannette EATON CMA   Accompanied by self         7/16/2025    11:28 AM   Patient Reported Additional Medications   Patient reports taking the following new medications none     hospitals          7/14/2025   Post Discharge Outreach   How are you doing now that you are home? Pt stated that she is feeling better.   How are your symptoms? (Red Flag symptoms escalate to triage hotline per guidelines) Improved   Does the patient have their discharge instructions?  Yes   Does the patient have questions regarding their discharge instructions?  No   Were you started on any new medications or were there changes to any of your previous medications?  Yes   Does the patient have all of their medications? Yes   Do you have questions regarding any of your medications?  No   Do you have all of your needed medical supplies or equipment (DME)?  (i.e. oxygen tank, CPAP, cane, etc.) Yes       Hospital Follow-up Visit:    Hospital/Nursing Home/IP Rehab Facility: [facility]  Most Recent Admission Date: 7/9/2025   Most Recent Admission Diagnosis: Sepsis due to urinary tract infection (H) - A41.9, N39.0     Most Recent Discharge Date: 7/12/2025   Most Recent Discharge Diagnosis: Sepsis due to urinary tract infection (H) - A41.9, N39.0  Pyelonephritis - N12   Do you have any other stressors you would like to discuss with your provider? OTHER: increase Zoloft dose- patient reports she has talked with provider about this in the  past, dose was increased once; currently at 175mg daily    Patient thinks the antibiotic she is on is making her sick  Patient feels worn out, unstable/      Problems taking medications regularly:  None  Medication changes since discharge: None  Problems adhering to non-medication therapy:      Summary of hospitalization:  Discharge summary unavailable  Diagnostic Tests/Treatments reviewed.  Follow up needed: none  Other Healthcare Providers Involved in Patient s Care:         None  Update since discharge: improved.         Plan of care communicated with patient           HPI: 75-year-old female with past medical history significant for rheumatoid arthritis, COPD, hyperlipidemia, GERD, tobacco abuse, depression and anxiety.  Presented to the ER on July 9 with a 4-day history of malaise and found to have a UTI with sepsis.  She was admitted to hospital due to diagnosis of septic shock, hypotension, and acute kidney injury, hyponatremia and thrombocytopenia.  While hospitalized she was treated with IV antibiotics.  Discharged on oral Bactrim.  Kidney function had returned to normal by time of discharge; as did the sodium levels and platelets.  She also had a new diagnosis of prediabetes with an A1c of 5.9 on admission.  Her other medical conditions were stable while hospitalized.    Today patient reports that she is feeling a little better every day. Still feeling fatigued and worn out. Antibiotics are causing some upset stomach but she wants to finish them.     She needs refill of the trazodone.  She also reports that her sertraline has been upsetting her stomach for years. She wants to try switching from a tablet to a capsule to see if this helps.  No other concerns or questions today.              Review of Systems  Constitutional, HEENT, cardiovascular, pulmonary, gi and gu systems are negative, except as otherwise noted.      Objective    BP 90/60 (BP Location: Right arm, Patient Position: Sitting, Cuff Size:  "Adult Regular)   Pulse 69   Temp 97.8  F (36.6  C) (Tympanic)   Resp 20   Ht 1.511 m (4' 11.5\")   Wt 61.2 kg (135 lb)   LMP  (LMP Unknown)   SpO2 95%   BMI 26.81 kg/m    Body mass index is 26.81 kg/m .  Physical Exam   GENERAL: alert and no distress  NECK: no adenopathy, no asymmetry, masses, or scars  RESP: lungs clear to auscultation - no rales, rhonchi or wheezes  CV: regular rate and rhythm, normal S1 S2, no S3 or S4, no murmur, click or rub, no peripheral edema  ABDOMEN: soft, nontender, no hepatosplenomegaly, no masses and bowel sounds normal  MS: no gross musculoskeletal defects noted, no edema            Signed Electronically by: ANN MARIE Pulido CNP    "

## 2025-08-03 DIAGNOSIS — F41.9 ANXIETY: ICD-10-CM

## 2025-08-04 RX ORDER — CLONAZEPAM 0.5 MG/1
0.5 TABLET ORAL 2 TIMES DAILY PRN
Qty: 10 TABLET | Refills: 0 | Status: SHIPPED | OUTPATIENT
Start: 2025-08-04

## 2025-08-21 ENCOUNTER — TRANSFERRED RECORDS (OUTPATIENT)
Dept: HEALTH INFORMATION MANAGEMENT | Facility: CLINIC | Age: 75
End: 2025-08-21
Payer: MEDICARE